# Patient Record
Sex: MALE | Race: WHITE | NOT HISPANIC OR LATINO | Employment: FULL TIME | URBAN - METROPOLITAN AREA
[De-identification: names, ages, dates, MRNs, and addresses within clinical notes are randomized per-mention and may not be internally consistent; named-entity substitution may affect disease eponyms.]

---

## 2017-01-26 ENCOUNTER — ALLSCRIPTS OFFICE VISIT (OUTPATIENT)
Dept: OTHER | Facility: OTHER | Age: 42
End: 2017-01-26

## 2017-06-09 LAB
A/G RATIO (HISTORICAL): 1.7 (ref 1.2–2.2)
ALBUMIN SERPL BCP-MCNC: 4.3 G/DL (ref 3.5–5.5)
ALP SERPL-CCNC: 56 IU/L (ref 39–117)
ALT SERPL W P-5'-P-CCNC: 33 IU/L (ref 0–44)
AST SERPL W P-5'-P-CCNC: 20 IU/L (ref 0–40)
BASOPHILS # BLD AUTO: 0 %
BASOPHILS # BLD AUTO: 0 X10E3/UL (ref 0–0.2)
BILIRUB SERPL-MCNC: <0.2 MG/DL (ref 0–1.2)
BUN SERPL-MCNC: 16 MG/DL (ref 6–24)
BUN/CREA RATIO (HISTORICAL): 18 (ref 9–20)
CALCIUM SERPL-MCNC: 9.3 MG/DL (ref 8.7–10.2)
CHLORIDE SERPL-SCNC: 99 MMOL/L (ref 96–106)
CHOLEST SERPL-MCNC: 157 MG/DL (ref 100–199)
CHOLEST/HDLC SERPL: 4.1 RATIO UNITS (ref 0–5)
CO2 SERPL-SCNC: 27 MMOL/L (ref 18–29)
CREAT SERPL-MCNC: 0.89 MG/DL (ref 0.76–1.27)
CREATININE, URINE (HISTORICAL): 144.5 MG/DL
DEPRECATED RDW RBC AUTO: 14 % (ref 12.3–15.4)
EGFR AFRICAN AMERICAN (HISTORICAL): 122 ML/MIN/1.73
EGFR-AMERICAN CALC (HISTORICAL): 105 ML/MIN/1.73
EOSINOPHIL # BLD AUTO: 0.2 X10E3/UL (ref 0–0.4)
EOSINOPHIL # BLD AUTO: 2 %
GLUCOSE SERPL-MCNC: 162 MG/DL (ref 65–99)
HCT VFR BLD AUTO: 40 % (ref 37.5–51)
HDLC SERPL-MCNC: 38 MG/DL
HGB BLD-MCNC: 13.7 G/DL (ref 12.6–17.7)
IMM.GRANULOCYTES (CD4/8) (HISTORICAL): 0 %
IMM.GRANULOCYTES (CD4/8) (HISTORICAL): 0 X10E3/UL (ref 0–0.1)
LDLC SERPL CALC-MCNC: 74 MG/DL (ref 0–99)
LYMPHOCYTES # BLD AUTO: 2.2 X10E3/UL (ref 0.7–3.1)
LYMPHOCYTES # BLD AUTO: 34 %
MCH RBC QN AUTO: 28 PG (ref 26.6–33)
MCHC RBC AUTO-ENTMCNC: 34.3 G/DL (ref 31.5–35.7)
MCV RBC AUTO: 82 FL (ref 79–97)
MICROALBUM.,U,RANDOM (HISTORICAL): 10.9 UG/ML
MICROALBUMIN/CREATININE RATIO (HISTORICAL): 7.5 MG/G CREAT (ref 0–30)
MONOCYTES # BLD AUTO: 0.5 X10E3/UL (ref 0.1–0.9)
MONOCYTES (HISTORICAL): 7 %
NEUTROPHILS # BLD AUTO: 3.6 X10E3/UL (ref 1.4–7)
NEUTROPHILS # BLD AUTO: 57 %
PLATELET # BLD AUTO: 216 X10E3/UL (ref 150–379)
POTASSIUM SERPL-SCNC: 4.5 MMOL/L (ref 3.5–5.2)
RBC (HISTORICAL): 4.9 X10E6/UL (ref 4.14–5.8)
SODIUM SERPL-SCNC: 142 MMOL/L (ref 134–144)
TOT. GLOBULIN, SERUM (HISTORICAL): 2.5 G/DL (ref 1.5–4.5)
TOTAL PROTEIN (HISTORICAL): 6.8 G/DL (ref 6–8.5)
TRIGL SERPL-MCNC: 225 MG/DL (ref 0–149)
VLDLC SERPL CALC-MCNC: 45 MG/DL (ref 5–40)
WBC # BLD AUTO: 6.4 X10E3/UL (ref 3.4–10.8)

## 2017-06-10 LAB — HBA1C MFR BLD HPLC: 8.4 % (ref 4.8–5.6)

## 2017-06-19 ENCOUNTER — ALLSCRIPTS OFFICE VISIT (OUTPATIENT)
Dept: OTHER | Facility: OTHER | Age: 42
End: 2017-06-19

## 2018-01-09 NOTE — MISCELLANEOUS
Message  Return to work or school:    He is able to return to work on  8/19/16      EXCUSE FROM WORK 8/14-8/18/16     Kia Barrett MD       Signatures   Electronically signed by : DOMITILA Jimenez ; Aug 17 2016  5:56AM EST                       (Author)

## 2018-01-13 VITALS
DIASTOLIC BLOOD PRESSURE: 80 MMHG | WEIGHT: 252 LBS | BODY MASS INDEX: 38.19 KG/M2 | TEMPERATURE: 97.6 F | SYSTOLIC BLOOD PRESSURE: 142 MMHG | HEIGHT: 68 IN | HEART RATE: 88 BPM

## 2018-01-14 VITALS
BODY MASS INDEX: 37.91 KG/M2 | WEIGHT: 250.13 LBS | HEART RATE: 74 BPM | RESPIRATION RATE: 16 BRPM | HEIGHT: 68 IN | DIASTOLIC BLOOD PRESSURE: 82 MMHG | TEMPERATURE: 97.5 F | SYSTOLIC BLOOD PRESSURE: 124 MMHG

## 2018-01-16 NOTE — RESULT NOTES
Verified Results  CT CHEST W CONTRAST [id number] 11:35AM Aurelia Bettencourt Order Number: [id number]    - Patient Instructions: To schedule this appointment, please contact Central Scheduling at [phone]  Test Name Result Flag Reference   CT CHEST W CONTRAST (Report)     CT CHEST WITH IV CONTRAST     INDICATION: Adenopathy  COMPARISON: None  TECHNIQUE: CT examination of the chest was performed  85 mL of Omnipaque 350 was injected intravenously  Axial, sagittal and coronal reformatted images were submitted for interpretation  Coronal thick section MIP (maximal intensity projection) images    were also created  This examination, like all CT scans performed in the [facility], was performed utilizing techniques to minimize radiation dose exposure, including the use of iterative reconstruction and automated exposure control  FINDINGS:     LUNGS: No airspace consolidation is identified  No secondary lobular thickening  No significant nodules are identified  No endobronchial lesion is identified  PLEURA: Trace pleural thickening involving the left horizontal fissure  Otherwise, pleura is unremarkable  HEART/GREAT VESSELS: Heart is normal in size  The aortic arch is normal in caliber  Arch vessels are normal in configuration  Descending thoracic aorta is normal in caliber  MEDIASTINUM AND BRY: The mediastinum is within normal limits without evidence for adenopathy or mass  No hilar lymph nodes identified  CHEST WALL AND LOWER NECK: Lymph nodes identified within the right axillary region of which, there is a large giovana conglomeration measuring 3 8 x 4 4 cm  No significant left-sided axillary adenopathy or additional lymph nodes identified  No signs of    retroperitoneal adenopathy identified  Minimal soft tissue seen in the periareolar region may reflect an element of underlying gynecomastia       VISUALIZED STRUCTURES IN THE UPPER ABDOMEN: There is a small hiatal hernia  Otherwise, visualized upper abdominal structures are unremarkable  OSSEOUS STRUCTURES: No acute fracture  No destructive osseous lesion  IMPRESSION:     Relatively isolated right axillary pathologic adenopathy  Consider tissue sampling as clinically indicated  Clear lungs without structural disease or nodule         Workstation performed: [id number]     Signed by:   Fady Cuadra MD   8/3/16

## 2018-02-18 DIAGNOSIS — E78.2 MIXED HYPERLIPIDEMIA: Primary | ICD-10-CM

## 2018-02-19 RX ORDER — ATORVASTATIN CALCIUM 10 MG/1
TABLET, FILM COATED ORAL
Qty: 90 TABLET | Refills: 1 | Status: SHIPPED | OUTPATIENT
Start: 2018-02-19 | End: 2018-08-18 | Stop reason: SDUPTHER

## 2018-03-02 ENCOUNTER — APPOINTMENT (OUTPATIENT)
Dept: LAB | Facility: CLINIC | Age: 43
End: 2018-03-02
Payer: COMMERCIAL

## 2018-03-02 ENCOUNTER — TRANSCRIBE ORDERS (OUTPATIENT)
Dept: LAB | Facility: CLINIC | Age: 43
End: 2018-03-02

## 2018-03-02 DIAGNOSIS — E10.49 TYPE 1 DIABETES MELLITUS WITH OTHER NEUROLOGIC COMPLICATION (HCC): ICD-10-CM

## 2018-03-02 DIAGNOSIS — M10.9 PODAGRA: ICD-10-CM

## 2018-03-02 DIAGNOSIS — E78.5 HYPERLIPIDEMIA, UNSPECIFIED HYPERLIPIDEMIA TYPE: ICD-10-CM

## 2018-03-02 DIAGNOSIS — E10.49 TYPE 1 DIABETES MELLITUS WITH OTHER NEUROLOGIC COMPLICATION (HCC): Primary | ICD-10-CM

## 2018-03-02 LAB
ANION GAP SERPL CALCULATED.3IONS-SCNC: 4 MMOL/L (ref 4–13)
BUN SERPL-MCNC: 18 MG/DL (ref 5–25)
CALCIUM SERPL-MCNC: 9.3 MG/DL (ref 8.3–10.1)
CHLORIDE SERPL-SCNC: 103 MMOL/L (ref 100–108)
CO2 SERPL-SCNC: 33 MMOL/L (ref 21–32)
CREAT SERPL-MCNC: 1.03 MG/DL (ref 0.6–1.3)
EST. AVERAGE GLUCOSE BLD GHB EST-MCNC: 197 MG/DL
GFR SERPL CREATININE-BSD FRML MDRD: 89 ML/MIN/1.73SQ M
GLUCOSE P FAST SERPL-MCNC: 182 MG/DL (ref 65–99)
HBA1C MFR BLD: 8.5 % (ref 4.2–6.3)
POTASSIUM SERPL-SCNC: 4.3 MMOL/L (ref 3.5–5.3)
SODIUM SERPL-SCNC: 140 MMOL/L (ref 136–145)
URATE SERPL-MCNC: 6.7 MG/DL (ref 4.2–8)

## 2018-03-02 PROCEDURE — 36415 COLL VENOUS BLD VENIPUNCTURE: CPT

## 2018-03-02 PROCEDURE — 80048 BASIC METABOLIC PNL TOTAL CA: CPT

## 2018-03-02 PROCEDURE — 84550 ASSAY OF BLOOD/URIC ACID: CPT

## 2018-03-02 PROCEDURE — 83036 HEMOGLOBIN GLYCOSYLATED A1C: CPT

## 2018-03-29 ENCOUNTER — OFFICE VISIT (OUTPATIENT)
Dept: FAMILY MEDICINE CLINIC | Facility: CLINIC | Age: 43
End: 2018-03-29
Payer: COMMERCIAL

## 2018-03-29 VITALS
BODY MASS INDEX: 38.45 KG/M2 | SYSTOLIC BLOOD PRESSURE: 122 MMHG | WEIGHT: 245 LBS | TEMPERATURE: 97.6 F | DIASTOLIC BLOOD PRESSURE: 82 MMHG | HEART RATE: 90 BPM | HEIGHT: 67 IN

## 2018-03-29 DIAGNOSIS — M10.9 GOUT, UNSPECIFIED CAUSE, UNSPECIFIED CHRONICITY, UNSPECIFIED SITE: ICD-10-CM

## 2018-03-29 DIAGNOSIS — E11.9 TYPE 2 DIABETES MELLITUS WITHOUT COMPLICATION, WITHOUT LONG-TERM CURRENT USE OF INSULIN (HCC): Primary | ICD-10-CM

## 2018-03-29 DIAGNOSIS — E78.2 MIXED HYPERLIPIDEMIA: ICD-10-CM

## 2018-03-29 PROCEDURE — 99214 OFFICE O/P EST MOD 30 MIN: CPT | Performed by: FAMILY MEDICINE

## 2018-03-29 RX ORDER — INDOMETHACIN 50 MG/1
CAPSULE ORAL EVERY 8 HOURS
COMMUNITY
Start: 2014-11-14 | End: 2018-06-02 | Stop reason: SDUPTHER

## 2018-03-29 NOTE — PROGRESS NOTES
Assessment/Plan:    Type 2 diabetes mellitus (HCC)  Poor control  Patient appeared to been doing much better when on Jardiance  Restart 10 mg a day-samples given  Recheck labs in 3 months  Further recommendations based on those results  Urged diet control as well as increased exercise in the interim  Hyperlipidemia  Controlled  Continue present medications  Recheck labs in 3 months    GERD without esophagitis  Stable  Continue monitor  Continue diet  Gout  Control  Continue dietary  Continue medicine p r n  [place] [facility] Diagnoses and all orders for this visit:    Type 2 diabetes mellitus without complication, without long-term current use of insulin (HCC)  -     Comprehensive metabolic panel; Future  -     HEMOGLOBIN A1C W/ EAG ESTIMATION; Future  -     Lipid panel; Future  -     Microalbumin / creatinine urine ratio; Future  -     TSH, 3rd generation; Future    Mixed hyperlipidemia    Gout, unspecified cause, unspecified chronicity, unspecified site  -     CBC and differential; Future          Subjective:      Patient ID: Josie San is a 43 y o  male  - pt took the samples of Jardiance and did note that his Bgs were down to 110-130 fasting  Ran out over 2-3 m ago and fasting Bgs now up to 150-180  Recent A1C = 8 7  No hypoglycemia  - pt states that he has had one"gout attack" in 6m  Uric acid 6 7 on diet  Attack resolved quickly with indocin    - no Cp, palp, lightheadedness or other CV symptoms with or without exertion  Is exercising regularly  - increased urination when taking Jardiance  Imporved off med  - no other concerns        The following portions of the patient's history were reviewed and updated as appropriate:   He  has a past medical history of Diabetes mellitus ([address] ) and GERD (gastroesophageal reflux disease)    He   Patient Active Problem List    Diagnosis Date Noted    Arthralgia of right knee 11/30/2015    GERD without esophagitis 11/14/2014    Hyperlipidemia 01/15/2014    Type 2 diabetes mellitus ([facility] [address] ) 01/14/2014    Gout 12/04/2013     He  reports that he has never smoked  He does not have any smokeless tobacco history on file  He reports that he does not drink alcohol or use drugs  Current Outpatient Prescriptions   Medication Sig Dispense Refill    atorvastatin (LIPITOR) 10 mg tablet TAKE 1 TABLET DAILY 90 tablet 1    JULISA MICROLET LANCETS lancets by Does not apply route 3 (three) times a day      Empagliflozin (JARDIANCE) 10 MG TABS Take 1 tablet by mouth daily      glucose blood (JULISA CONTOUR NEXT TEST) test strip by In Vitro route 3 (three) times a day      indomethacin (INDOCIN) 50 mg capsule Take by mouth every 8 (eight) hours      metFORMIN (GLUCOPHAGE) 500 mg tablet Take 500 mg by mouth 2 (two) times a day with meals   omeprazole (PriLOSEC) 40 MG capsule Take 40 mg by mouth daily  No current facility-administered medications for this visit  He has No Known Allergies       Review of Systems   Constitutional: Negative  HENT: Negative  Eyes: Negative  Respiratory: Negative  Cardiovascular: Negative  Gastrointestinal: Negative  Endocrine: Negative  Genitourinary: Negative  Musculoskeletal: Negative  Skin: Negative  Allergic/Immunologic: Negative  Neurological: Negative  Hematological: Negative  Psychiatric/Behavioral: Negative  Objective:      /82   Pulse 90   Temp 97 6 °F (36 4 °C)   Ht 5' 7" (1 702 m)   Wt 111 kg (245 lb)   BMI 38 37 kg/m²          Physical Exam   Constitutional: He is oriented to person, place, and time  He appears well-developed and well-nourished  HENT:   Head: Normocephalic and atraumatic  Right Ear: External ear normal    Left Ear: External ear normal    Nose: Nose normal    Mouth/Throat: Oropharynx is clear and moist    Eyes: Conjunctivae and EOM are normal  Pupils are equal, round, and reactive to light  Neck: Normal range of motion  Neck supple   No thyromegaly present  Cardiovascular: Normal rate, regular rhythm, normal heart sounds and intact distal pulses  Pulses are no weak pulses  No murmur heard  Pulses:       Dorsalis pedis pulses are 2+ on the right side, and 2+ on the left side  Pulmonary/Chest: Effort normal and breath sounds normal    Abdominal: Soft  Bowel sounds are normal  He exhibits no distension and no mass  There is no tenderness  Musculoskeletal: Normal range of motion  He exhibits no edema or tenderness  Feet:   Right Foot:   Skin Integrity: Negative for ulcer, skin breakdown, erythema, warmth, callus or dry skin  Left Foot:   Skin Integrity: Negative for ulcer, skin breakdown, erythema, warmth, callus or dry skin  Lymphadenopathy:     He has no cervical adenopathy  Neurological: He is alert and oriented to person, place, and time  No cranial nerve deficit  Coordination normal    Skin: Skin is warm  Psychiatric: He has a normal mood and affect  His behavior is normal  Judgment and thought content normal    Vitals reviewed  Patient's shoes and socks removed  Right Foot/Ankle   Right Foot Inspection  Skin Exam: skin normal and skin intact no dry skin, no warmth, no callus, no erythema, no maceration, no abnormal color, no pre-ulcer, no ulcer and no callus                          Toe Exam: ROM and strength within normal limits  Sensory     Proprioception: intact   Monofilament testing: intact  Vascular  Capillary refills: < 3 seconds  The right DP pulse is 2+  Left Foot/Ankle  Left Foot Inspection  Skin Exam: skin normal and skin intactno dry skin, no warmth, no erythema, no maceration, normal color, no pre-ulcer, no ulcer and no callus                         Toe Exam: ROM and strength within normal limits                   Sensory     Proprioception: intact  Monofilament: intact  Vascular  Capillary refills: < 3 seconds  The left DP pulse is 2+  Assign Risk Category:  No deformity present;  No loss of protective sensation;  No weak pulses       Risk: 0

## 2018-03-30 NOTE — ASSESSMENT & PLAN NOTE
Poor control  Patient appeared to been doing much better when on Jardiance  Restart 10 mg a day-samples given  Recheck labs in 3 months  Further recommendations based on those results  Urged diet control as well as increased exercise in the interim

## 2018-05-31 ENCOUNTER — OFFICE VISIT (OUTPATIENT)
Dept: URGENT CARE | Facility: MEDICAL CENTER | Age: 43
End: 2018-05-31
Payer: COMMERCIAL

## 2018-05-31 VITALS
OXYGEN SATURATION: 98 % | HEART RATE: 94 BPM | RESPIRATION RATE: 16 BRPM | HEIGHT: 67 IN | WEIGHT: 233.8 LBS | TEMPERATURE: 98.2 F | DIASTOLIC BLOOD PRESSURE: 74 MMHG | BODY MASS INDEX: 36.7 KG/M2 | SYSTOLIC BLOOD PRESSURE: 108 MMHG

## 2018-05-31 DIAGNOSIS — L02.92 BOIL: Primary | ICD-10-CM

## 2018-05-31 PROCEDURE — 99214 OFFICE O/P EST MOD 30 MIN: CPT | Performed by: PHYSICIAN ASSISTANT

## 2018-05-31 RX ORDER — SULFAMETHOXAZOLE AND TRIMETHOPRIM 800; 160 MG/1; MG/1
1 TABLET ORAL EVERY 12 HOURS SCHEDULED
Qty: 14 TABLET | Refills: 0 | Status: SHIPPED | OUTPATIENT
Start: 2018-05-31 | End: 2018-06-07

## 2018-06-01 NOTE — PROGRESS NOTES
[facility]        NAME: Reginald Parker is a 37 y o  male  : 1975    MRN: [id number]  DATE: May 31, 2018  TIME: 9:22 PM    Assessment and Plan   Boil [L02 92]  1  Boil  sulfamethoxazole-trimethoprim (BACTRIM DS) 800-160 mg per tablet         Patient Instructions       Follow up with PCP in 3-5 days  Proceed to  ER if symptoms worsen  Chief Complaint     Chief Complaint   Patient presents with    Skin Lesion     possible ingrown hair to lower midline abdomen  has had for 2 weeks, but has increased in size and pain  History of Present Illness       The patient is a 12-year-old male presents with a painful lesion on his lower abdomen x1 week  He states the area became inflamed approximately 1 week prior and thought it was a [employer]  Patient is eyes any drainage from the lesion, he does report that the area has become increasingly irritated, swollen and tender  He denies any systemic symptoms such as fever or chills, he has had no skin drainage  Review of Systems   Review of Systems   Constitutional: Negative  HENT: Negative  Skin: Positive for wound  Current Medications       Current Outpatient Prescriptions:     atorvastatin (LIPITOR) 10 mg tablet, TAKE 1 TABLET DAILY, Disp: 90 tablet, Rfl: 1    JULISA MICROLET LANCETS lancets, by Does not apply route 3 (three) times a day, Disp: , Rfl:     Empagliflozin (JARDIANCE) 10 MG TABS, Take 1 tablet by mouth daily, Disp: , Rfl:     glucose blood (JULISA CONTOUR NEXT TEST) test strip, by In Vitro route 3 (three) times a day, Disp: , Rfl:     indomethacin (INDOCIN) 50 mg capsule, Take by mouth every 8 (eight) hours, Disp: , Rfl:     metFORMIN (GLUCOPHAGE) 500 mg tablet, Take 500 mg by mouth 2 (two) times a day with meals  , Disp: , Rfl:     omeprazole (PriLOSEC) 40 MG capsule, Take 40 mg by mouth daily  , Disp: , Rfl:     sulfamethoxazole-trimethoprim (BACTRIM DS) 800-160 mg per tablet, Take 1 tablet by mouth every 12 (twelve) hours for 7 days, Disp: 14 tablet, Rfl: 0    Current Allergies     Allergies as of 05/31/2018    (No Known Allergies)            The following portions of the patient's history were reviewed and updated as appropriate: allergies, current medications, past family history, past medical history, past social history, past surgical history and problem list      Past Medical History:   Diagnosis Date    Diabetes mellitus ([address] )     elevated bloodsugars     GERD (gastroesophageal reflux disease)        Past Surgical History:   Procedure Laterality Date    CHEST WALL BIOPSY N/A 9/15/2016    Procedure: CHEST LESION EXCISION ;  Surgeon: Mike Miller MD;  [facility];  Service:     WI BIOPSY/EXCISION, LYMPH NODE(S) Right 9/15/2016    Procedure: AXILLARY LYMPH NODE BIOPSY WITH FLOW CYTOMETRY;  Surgeon: Mike Miller MD;  [facility];  Service: General    TRUNK SKIN LESION EXCISIONAL BIOPSY      Benign 2 1-3 cm    UPPER ENDOSCOPY W/ ESOPHAGEAL MANOMETRY         Family History   Problem Relation Age of Onset    Hypertension Mother     Diabetes Father     Prostate cancer Father          Medications have been verified  Objective   /74   Pulse 94   Temp 98 2 °F (36 8 °C) (Temporal)   Resp 16   Ht 5' 7" (1 702 m)   Wt 106 kg (233 lb 12 8 oz)   SpO2 98%   BMI 36 62 kg/m²        Physical Exam     Physical Exam   Constitutional: He appears well-developed and well-nourished  No distress  Cardiovascular: Normal rate, regular rhythm and normal heart sounds  No murmur heard    Skin:

## 2018-06-01 NOTE — PATIENT INSTRUCTIONS
1  Take Bactrim 1 tablet twice daily x 7 days  2  Warm compresses to lesion until resolved  3  Follow-up with PCP if symptoms worsen

## 2018-06-02 DIAGNOSIS — E11.9 TYPE 2 DIABETES MELLITUS WITHOUT COMPLICATION, WITHOUT LONG-TERM CURRENT USE OF INSULIN (HCC): Primary | ICD-10-CM

## 2018-06-02 DIAGNOSIS — M10.9 GOUT, UNSPECIFIED CAUSE, UNSPECIFIED CHRONICITY, UNSPECIFIED SITE: ICD-10-CM

## 2018-06-03 RX ORDER — BLOOD SUGAR DIAGNOSTIC
STRIP MISCELLANEOUS
Qty: 300 EACH | Refills: 1 | Status: SHIPPED | OUTPATIENT
Start: 2018-06-03 | End: 2018-11-30 | Stop reason: SDUPTHER

## 2018-06-03 RX ORDER — INDOMETHACIN 50 MG/1
CAPSULE ORAL
Qty: 270 CAPSULE | Refills: 1 | Status: SHIPPED | OUTPATIENT
Start: 2018-06-03 | End: 2018-11-29 | Stop reason: SDUPTHER

## 2018-06-05 ENCOUNTER — OFFICE VISIT (OUTPATIENT)
Dept: FAMILY MEDICINE CLINIC | Facility: CLINIC | Age: 43
End: 2018-06-05
Payer: COMMERCIAL

## 2018-06-05 VITALS
HEART RATE: 84 BPM | BODY MASS INDEX: 37.07 KG/M2 | TEMPERATURE: 99.3 F | WEIGHT: 236.2 LBS | DIASTOLIC BLOOD PRESSURE: 88 MMHG | HEIGHT: 67 IN | SYSTOLIC BLOOD PRESSURE: 120 MMHG

## 2018-06-05 DIAGNOSIS — L08.9 INFECTED CYST OF SKIN: Primary | ICD-10-CM

## 2018-06-05 DIAGNOSIS — L72.9 INFECTED CYST OF SKIN: Primary | ICD-10-CM

## 2018-06-05 PROCEDURE — 87205 SMEAR GRAM STAIN: CPT | Performed by: FAMILY MEDICINE

## 2018-06-05 PROCEDURE — 99213 OFFICE O/P EST LOW 20 MIN: CPT | Performed by: FAMILY MEDICINE

## 2018-06-05 PROCEDURE — 87070 CULTURE OTHR SPECIMN AEROBIC: CPT | Performed by: FAMILY MEDICINE

## 2018-06-05 PROCEDURE — 10060 I&D ABSCESS SIMPLE/SINGLE: CPT | Performed by: FAMILY MEDICINE

## 2018-06-05 RX ORDER — AMOXICILLIN AND CLAVULANATE POTASSIUM 875; 125 MG/1; MG/1
1 TABLET, FILM COATED ORAL EVERY 12 HOURS SCHEDULED
Qty: 20 TABLET | Refills: 0 | Status: SHIPPED | OUTPATIENT
Start: 2018-06-05 | End: 2018-06-15

## 2018-06-05 NOTE — PROGRESS NOTES
Assessment/Plan:    No problem-specific Assessment & Plan notes found for this encounter  Diagnoses and all orders for this visit:    Infected cyst of skin  Comments:  improved s/p I&D  Discussed wound care  Start Augmentin  Finish Bactrim  Remove packing in 24-48h  Recheck Friday if not resolving - earlier if worse  Orders:  -     Incision and Drainage  -     amoxicillin-clavulanate (AUGMENTIN) 875-125 mg per tablet; Take 1 tablet by mouth every 12 (twelve) hours for 10 days  -     Wound culture and Gram stain; Future  -     Wound culture and Gram stain          Subjective:      Patient ID: Carlee Saucedo is a 37 y o  male  Pt with long hx of ?small mass/cyst over the low abd along his belt line  Lesion increased in size and became tender over the last week  Seen at walk-in center this past weekend and started on Bactrim without improvement  Pt with increased pain over the last 24h  Pt here for recheck  Pt denies fever/chills, diffuse abd pain or other problems  Pt denies drainage from the site        The following portions of the patient's history were reviewed and updated as appropriate:   He  has a past medical history of Diabetes mellitus ([facility] [address] ) and GERD (gastroesophageal reflux disease)  He   Patient Active Problem List    Diagnosis Date Noted    Arthralgia of right knee 11/30/2015    GERD without esophagitis 11/14/2014    Hyperlipidemia 01/15/2014    Type 2 diabetes mellitus ([facility] [address] ) 01/14/2014    Gout 12/04/2013     He  reports that he has never smoked  He has never used smokeless tobacco  He reports that he does not drink alcohol or use drugs    Current Outpatient Prescriptions   Medication Sig Dispense Refill    atorvastatin (LIPITOR) 10 mg tablet TAKE 1 TABLET DAILY 90 tablet 1    JULISA MICROLET LANCETS lancets by Does not apply route 3 (three) times a day      CONTOUR NEXT TEST test strip TEST THREE TIMES A  each 1    Empagliflozin (JARDIANCE) 10 MG TABS Take 1 tablet by mouth daily      indomethacin (INDOCIN) 50 mg capsule TAKE 1 CAPSULE EVERY 8 HOURS AS NEEDED 270 capsule 1    metFORMIN (GLUCOPHAGE) 500 mg tablet Take 500 mg by mouth 2 (two) times a day with meals   omeprazole (PriLOSEC) 40 MG capsule Take 40 mg by mouth daily   sulfamethoxazole-trimethoprim (BACTRIM DS) 800-160 mg per tablet Take 1 tablet by mouth every 12 (twelve) hours for 7 days 14 tablet 0    amoxicillin-clavulanate (AUGMENTIN) 875-125 mg per tablet Take 1 tablet by mouth every 12 (twelve) hours for 10 days 20 tablet 0     No current facility-administered medications for this visit  He has No Known Allergies       Review of Systems   Constitutional: Negative  Gastrointestinal: Positive for abdominal pain (at low abd site)  Negative for abdominal distention, anal bleeding, blood in stool, constipation, diarrhea, nausea and vomiting  Skin: Positive for color change and rash  Negative for pallor and wound  Objective:      /88   Pulse 84   Temp 99 3 °F (37 4 °C)   Ht 5' 7" (1 702 m)   Wt 107 kg (236 lb 3 2 oz)   BMI 36 99 kg/m²          Physical Exam   Constitutional: He appears well-developed and well-nourished  Abdominal: Soft  Bowel sounds are normal  He exhibits no distension  There is tenderness (tender mass with centrlal fluctuance over the low abd along the belt line  No drainage)  Skin: Skin is warm  There is erythema (as above)           Incision and Drainage  Date/Time: 6/5/2018 2:11 PM  Performed by: Johann Shaikh by: Natalie Rios     Patient location:  Clinic  Other Assisting Provider: No    Consent:     Consent obtained:  Verbal    Consent given by:  Patient    Risks discussed:  Bleeding, incomplete drainage, pain and infection    Alternatives discussed:  Observation  Universal protocol:     Procedure explained and questions answered to patient or proxy's satisfaction: yes      Patient identity confirmed:  Verbally with patient  Location:     Type:  Abscess and cyst    Location:  Trunk    Trunk location:  Abdomen  Pre-procedure details:     Skin preparation:  Betadine  Anesthesia (see MAR for exact dosages): Anesthesia method:  Local infiltration    Local anesthetic:  Lidocaine 2% w/o epi  Procedure details:     Complexity:  Simple    Needle aspiration: no      Incision types:  Stab incision    Scalpel blade:  10    Approach:  Puncture    Incision depth:  Skin and subcutaneous    Wound management:  Probed and deloculated    Drainage:  Purulent    Drainage amount: Moderate    Wound treatment:  Packing placed    Packing materials:  1/4 in iodoform gauze    Amount 1/4" iodoform:  3"  Post-procedure details:     Patient tolerance of procedure:   Tolerated well, no immediate complications    Complication (if applicable):  None

## 2018-06-08 LAB
BACTERIA WND AEROBE CULT: ABNORMAL
GRAM STN SPEC: ABNORMAL
GRAM STN SPEC: ABNORMAL

## 2018-06-13 ENCOUNTER — OFFICE VISIT (OUTPATIENT)
Dept: FAMILY MEDICINE CLINIC | Facility: CLINIC | Age: 43
End: 2018-06-13

## 2018-06-13 VITALS
SYSTOLIC BLOOD PRESSURE: 98 MMHG | TEMPERATURE: 97.9 F | DIASTOLIC BLOOD PRESSURE: 70 MMHG | HEIGHT: 67 IN | BODY MASS INDEX: 36.41 KG/M2 | HEART RATE: 80 BPM | WEIGHT: 232 LBS

## 2018-06-13 DIAGNOSIS — L72.9 INFECTED CYST OF SKIN: Primary | ICD-10-CM

## 2018-06-13 DIAGNOSIS — L08.9 INFECTED CYST OF SKIN: Primary | ICD-10-CM

## 2018-06-13 DIAGNOSIS — E11.9 TYPE 2 DIABETES MELLITUS WITHOUT COMPLICATION, WITHOUT LONG-TERM CURRENT USE OF INSULIN (HCC): ICD-10-CM

## 2018-06-13 PROCEDURE — RECHECK: Performed by: FAMILY MEDICINE

## 2018-06-13 NOTE — PROGRESS NOTES
Assessment/Plan:      Diagnoses and all orders for this visit:    Type 2 diabetes mellitus without complication, without long-term current use of insulin ([facility])  -     Empagliflozin (JARDIANCE) 10 MG TABS; Take 1 tablet (10 mg total) by mouth daily    Infected cyst of skin     Discussion: wound still with some purulent discharge but no cyst material  Fullness below the incision raises the possibility of loculations  REC; finsih abx  Moist heat and gentle massage to area as discussed  Recheck 1 week if no t resolving - earlier if worse    Subjective:     Patient ID: Yue Almazan is a 37 y o  male  Pt is 8 day s/p I&D of low abd wall abscess (?abscessed cyst)  Pt states that the area is much better and the pain has resolved but there is still some fullness in the area and some drainage from the site  No redness, fever or chills  Cx grew out Coag Neg Staph  Pt with 2-3 days of augmentin left        Review of Systems   Constitutional: Negative  Skin: Positive for wound  Negative for color change and rash  Objective:     Physical Exam   Constitutional: He appears well-developed and well-nourished  Abdominal: Soft

## 2018-07-16 DIAGNOSIS — E11.9 TYPE 2 DIABETES MELLITUS WITHOUT COMPLICATION, WITHOUT LONG-TERM CURRENT USE OF INSULIN (HCC): Primary | ICD-10-CM

## 2018-08-18 DIAGNOSIS — E78.2 MIXED HYPERLIPIDEMIA: ICD-10-CM

## 2018-08-18 RX ORDER — ATORVASTATIN CALCIUM 10 MG/1
TABLET, FILM COATED ORAL
Qty: 90 TABLET | Refills: 1 | Status: SHIPPED | OUTPATIENT
Start: 2018-08-18 | End: 2019-02-14 | Stop reason: SDUPTHER

## 2018-11-22 DIAGNOSIS — E11.9 TYPE 2 DIABETES MELLITUS WITHOUT COMPLICATION, WITHOUT LONG-TERM CURRENT USE OF INSULIN (HCC): ICD-10-CM

## 2018-11-23 RX ORDER — EMPAGLIFLOZIN 10 MG/1
TABLET, FILM COATED ORAL
Qty: 90 TABLET | Refills: 1 | Status: SHIPPED | OUTPATIENT
Start: 2018-11-23 | End: 2019-01-14 | Stop reason: ALTCHOICE

## 2018-11-29 DIAGNOSIS — M10.9 GOUT, UNSPECIFIED CAUSE, UNSPECIFIED CHRONICITY, UNSPECIFIED SITE: ICD-10-CM

## 2018-11-29 DIAGNOSIS — K21.9 GASTROESOPHAGEAL REFLUX DISEASE, ESOPHAGITIS PRESENCE NOT SPECIFIED: Primary | ICD-10-CM

## 2018-11-29 RX ORDER — OMEPRAZOLE 40 MG/1
CAPSULE, DELAYED RELEASE ORAL
Qty: 90 CAPSULE | Refills: 3 | Status: SHIPPED | OUTPATIENT
Start: 2018-11-29 | End: 2019-11-25 | Stop reason: SDUPTHER

## 2018-11-29 RX ORDER — INDOMETHACIN 50 MG/1
CAPSULE ORAL
Qty: 270 CAPSULE | Refills: 1 | Status: SHIPPED | OUTPATIENT
Start: 2018-11-29 | End: 2019-05-28 | Stop reason: SDUPTHER

## 2018-11-29 NOTE — TELEPHONE ENCOUNTER
Pt due for f/u re: DM next month  Please have him schedule appt   Lab orders should be available in epic

## 2018-11-30 DIAGNOSIS — E11.9 TYPE 2 DIABETES MELLITUS WITHOUT COMPLICATION, WITHOUT LONG-TERM CURRENT USE OF INSULIN (HCC): ICD-10-CM

## 2018-11-30 RX ORDER — BLOOD SUGAR DIAGNOSTIC
STRIP MISCELLANEOUS
Qty: 300 EACH | Refills: 1 | Status: SHIPPED | OUTPATIENT
Start: 2018-11-30 | End: 2019-05-29 | Stop reason: SDUPTHER

## 2019-01-10 ENCOUNTER — APPOINTMENT (OUTPATIENT)
Dept: LAB | Facility: CLINIC | Age: 44
End: 2019-01-10
Payer: COMMERCIAL

## 2019-01-10 DIAGNOSIS — E11.9 TYPE 2 DIABETES MELLITUS WITHOUT COMPLICATION, WITHOUT LONG-TERM CURRENT USE OF INSULIN (HCC): ICD-10-CM

## 2019-01-10 DIAGNOSIS — M10.9 GOUT, UNSPECIFIED CAUSE, UNSPECIFIED CHRONICITY, UNSPECIFIED SITE: ICD-10-CM

## 2019-01-10 LAB
ALBUMIN SERPL BCP-MCNC: 4.2 G/DL (ref 3.5–5)
ALP SERPL-CCNC: 54 U/L (ref 46–116)
ALT SERPL W P-5'-P-CCNC: 37 U/L (ref 12–78)
ANION GAP SERPL CALCULATED.3IONS-SCNC: 5 MMOL/L (ref 4–13)
AST SERPL W P-5'-P-CCNC: 18 U/L (ref 5–45)
BASOPHILS # BLD AUTO: 0.03 THOUSANDS/ΜL (ref 0–0.1)
BASOPHILS NFR BLD AUTO: 1 % (ref 0–1)
BILIRUB SERPL-MCNC: 0.5 MG/DL (ref 0.2–1)
BUN SERPL-MCNC: 22 MG/DL (ref 5–25)
CALCIUM SERPL-MCNC: 9.3 MG/DL (ref 8.3–10.1)
CHLORIDE SERPL-SCNC: 104 MMOL/L (ref 100–108)
CHOLEST SERPL-MCNC: 136 MG/DL (ref 50–200)
CO2 SERPL-SCNC: 29 MMOL/L (ref 21–32)
CREAT SERPL-MCNC: 1.02 MG/DL (ref 0.6–1.3)
CREAT UR-MCNC: 94.8 MG/DL
EOSINOPHIL # BLD AUTO: 0.14 THOUSAND/ΜL (ref 0–0.61)
EOSINOPHIL NFR BLD AUTO: 2 % (ref 0–6)
ERYTHROCYTE [DISTWIDTH] IN BLOOD BY AUTOMATED COUNT: 12.5 % (ref 11.6–15.1)
EST. AVERAGE GLUCOSE BLD GHB EST-MCNC: 212 MG/DL
GFR SERPL CREATININE-BSD FRML MDRD: 90 ML/MIN/1.73SQ M
GLUCOSE P FAST SERPL-MCNC: 139 MG/DL (ref 65–99)
HBA1C MFR BLD: 9 % (ref 4.2–6.3)
HCT VFR BLD AUTO: 49.7 % (ref 36.5–49.3)
HDLC SERPL-MCNC: 31 MG/DL (ref 40–60)
HGB BLD-MCNC: 16 G/DL (ref 12–17)
IMM GRANULOCYTES # BLD AUTO: 0.03 THOUSAND/UL (ref 0–0.2)
IMM GRANULOCYTES NFR BLD AUTO: 1 % (ref 0–2)
LDLC SERPL CALC-MCNC: 77 MG/DL (ref 0–100)
LYMPHOCYTES # BLD AUTO: 2.41 THOUSANDS/ΜL (ref 0.6–4.47)
LYMPHOCYTES NFR BLD AUTO: 37 % (ref 14–44)
MCH RBC QN AUTO: 28.5 PG (ref 26.8–34.3)
MCHC RBC AUTO-ENTMCNC: 32.2 G/DL (ref 31.4–37.4)
MCV RBC AUTO: 89 FL (ref 82–98)
MICROALBUMIN UR-MCNC: 8 MG/L (ref 0–20)
MICROALBUMIN/CREAT 24H UR: 8 MG/G CREATININE (ref 0–30)
MONOCYTES # BLD AUTO: 0.5 THOUSAND/ΜL (ref 0.17–1.22)
MONOCYTES NFR BLD AUTO: 8 % (ref 4–12)
NEUTROPHILS # BLD AUTO: 3.4 THOUSANDS/ΜL (ref 1.85–7.62)
NEUTS SEG NFR BLD AUTO: 51 % (ref 43–75)
NONHDLC SERPL-MCNC: 105 MG/DL
NRBC BLD AUTO-RTO: 0 /100 WBCS
PLATELET # BLD AUTO: 213 THOUSANDS/UL (ref 149–390)
PMV BLD AUTO: 10.6 FL (ref 8.9–12.7)
POTASSIUM SERPL-SCNC: 4.3 MMOL/L (ref 3.5–5.3)
PROT SERPL-MCNC: 7.8 G/DL (ref 6.4–8.2)
RBC # BLD AUTO: 5.61 MILLION/UL (ref 3.88–5.62)
SODIUM SERPL-SCNC: 138 MMOL/L (ref 136–145)
TRIGL SERPL-MCNC: 138 MG/DL
TSH SERPL DL<=0.05 MIU/L-ACNC: 2.51 UIU/ML (ref 0.36–3.74)
WBC # BLD AUTO: 6.51 THOUSAND/UL (ref 4.31–10.16)

## 2019-01-10 PROCEDURE — 84443 ASSAY THYROID STIM HORMONE: CPT

## 2019-01-10 PROCEDURE — 82043 UR ALBUMIN QUANTITATIVE: CPT

## 2019-01-10 PROCEDURE — 82570 ASSAY OF URINE CREATININE: CPT

## 2019-01-10 PROCEDURE — 85025 COMPLETE CBC W/AUTO DIFF WBC: CPT

## 2019-01-10 PROCEDURE — 80061 LIPID PANEL: CPT

## 2019-01-10 PROCEDURE — 80053 COMPREHEN METABOLIC PANEL: CPT

## 2019-01-10 PROCEDURE — 36415 COLL VENOUS BLD VENIPUNCTURE: CPT

## 2019-01-10 PROCEDURE — 3061F NEG MICROALBUMINURIA REV: CPT | Performed by: FAMILY MEDICINE

## 2019-01-10 PROCEDURE — 83036 HEMOGLOBIN GLYCOSYLATED A1C: CPT

## 2019-01-12 DIAGNOSIS — E11.9 TYPE 2 DIABETES MELLITUS WITHOUT COMPLICATION, WITHOUT LONG-TERM CURRENT USE OF INSULIN (HCC): ICD-10-CM

## 2019-01-14 ENCOUNTER — OFFICE VISIT (OUTPATIENT)
Dept: FAMILY MEDICINE CLINIC | Facility: CLINIC | Age: 44
End: 2019-01-14
Payer: COMMERCIAL

## 2019-01-14 VITALS
HEIGHT: 67 IN | DIASTOLIC BLOOD PRESSURE: 80 MMHG | HEART RATE: 88 BPM | SYSTOLIC BLOOD PRESSURE: 104 MMHG | WEIGHT: 234.6 LBS | TEMPERATURE: 95.6 F | BODY MASS INDEX: 36.82 KG/M2

## 2019-01-14 DIAGNOSIS — E78.2 MIXED HYPERLIPIDEMIA: ICD-10-CM

## 2019-01-14 DIAGNOSIS — E11.9 TYPE 2 DIABETES MELLITUS WITHOUT COMPLICATION, WITHOUT LONG-TERM CURRENT USE OF INSULIN (HCC): Primary | ICD-10-CM

## 2019-01-14 DIAGNOSIS — M10.9 GOUT, UNSPECIFIED CAUSE, UNSPECIFIED CHRONICITY, UNSPECIFIED SITE: ICD-10-CM

## 2019-01-14 PROCEDURE — 99214 OFFICE O/P EST MOD 30 MIN: CPT | Performed by: FAMILY MEDICINE

## 2019-01-14 PROCEDURE — 3008F BODY MASS INDEX DOCD: CPT | Performed by: FAMILY MEDICINE

## 2019-01-15 NOTE — ASSESSMENT & PLAN NOTE
Lab Results   Component Value Date    HGBA1C 9 0 (H) 01/10/2019     PT doing better with diet and is exercising regularly  REC: increase Jardiance to 25mg qd  Cont present care   Recheck labs and f/u 3m - earlier if home BGs remain elevated

## 2019-02-14 DIAGNOSIS — E78.2 MIXED HYPERLIPIDEMIA: ICD-10-CM

## 2019-02-14 RX ORDER — ATORVASTATIN CALCIUM 10 MG/1
TABLET, FILM COATED ORAL
Qty: 90 TABLET | Refills: 1 | Status: SHIPPED | OUTPATIENT
Start: 2019-02-14 | End: 2019-08-13 | Stop reason: SDUPTHER

## 2019-02-21 DIAGNOSIS — E11.9 TYPE 2 DIABETES MELLITUS WITHOUT COMPLICATION, WITHOUT LONG-TERM CURRENT USE OF INSULIN (HCC): ICD-10-CM

## 2019-04-23 ENCOUNTER — APPOINTMENT (OUTPATIENT)
Dept: LAB | Facility: CLINIC | Age: 44
End: 2019-04-23
Payer: COMMERCIAL

## 2019-04-23 DIAGNOSIS — E11.9 TYPE 2 DIABETES MELLITUS WITHOUT COMPLICATION, WITHOUT LONG-TERM CURRENT USE OF INSULIN (HCC): ICD-10-CM

## 2019-04-23 LAB
ANION GAP SERPL CALCULATED.3IONS-SCNC: 1 MMOL/L (ref 4–13)
BUN SERPL-MCNC: 22 MG/DL (ref 5–25)
CALCIUM SERPL-MCNC: 9.2 MG/DL (ref 8.3–10.1)
CHLORIDE SERPL-SCNC: 108 MMOL/L (ref 100–108)
CO2 SERPL-SCNC: 32 MMOL/L (ref 21–32)
CREAT SERPL-MCNC: 0.97 MG/DL (ref 0.6–1.3)
EST. AVERAGE GLUCOSE BLD GHB EST-MCNC: 140 MG/DL
GFR SERPL CREATININE-BSD FRML MDRD: 95 ML/MIN/1.73SQ M
GLUCOSE P FAST SERPL-MCNC: 121 MG/DL (ref 65–99)
HBA1C MFR BLD: 6.5 % (ref 4.2–6.3)
POTASSIUM SERPL-SCNC: 4.8 MMOL/L (ref 3.5–5.3)
SODIUM SERPL-SCNC: 141 MMOL/L (ref 136–145)

## 2019-04-23 PROCEDURE — 36415 COLL VENOUS BLD VENIPUNCTURE: CPT

## 2019-04-23 PROCEDURE — 83036 HEMOGLOBIN GLYCOSYLATED A1C: CPT

## 2019-04-23 PROCEDURE — 80048 BASIC METABOLIC PNL TOTAL CA: CPT

## 2019-05-01 ENCOUNTER — OFFICE VISIT (OUTPATIENT)
Dept: FAMILY MEDICINE CLINIC | Facility: CLINIC | Age: 44
End: 2019-05-01
Payer: COMMERCIAL

## 2019-05-01 VITALS
HEART RATE: 74 BPM | DIASTOLIC BLOOD PRESSURE: 72 MMHG | HEIGHT: 67 IN | SYSTOLIC BLOOD PRESSURE: 102 MMHG | WEIGHT: 216 LBS | TEMPERATURE: 98.2 F | BODY MASS INDEX: 33.9 KG/M2

## 2019-05-01 DIAGNOSIS — M10.00 IDIOPATHIC GOUT, UNSPECIFIED CHRONICITY, UNSPECIFIED SITE: ICD-10-CM

## 2019-05-01 DIAGNOSIS — E11.9 TYPE 2 DIABETES MELLITUS WITHOUT COMPLICATION, WITHOUT LONG-TERM CURRENT USE OF INSULIN (HCC): Primary | ICD-10-CM

## 2019-05-01 DIAGNOSIS — E78.2 MIXED HYPERLIPIDEMIA: ICD-10-CM

## 2019-05-01 PROCEDURE — 99214 OFFICE O/P EST MOD 30 MIN: CPT | Performed by: FAMILY MEDICINE

## 2019-05-01 PROCEDURE — 3008F BODY MASS INDEX DOCD: CPT | Performed by: FAMILY MEDICINE

## 2019-05-01 PROCEDURE — 1036F TOBACCO NON-USER: CPT | Performed by: FAMILY MEDICINE

## 2019-05-28 DIAGNOSIS — M10.9 GOUT, UNSPECIFIED CAUSE, UNSPECIFIED CHRONICITY, UNSPECIFIED SITE: ICD-10-CM

## 2019-05-28 RX ORDER — INDOMETHACIN 50 MG/1
CAPSULE ORAL
Qty: 270 CAPSULE | Refills: 1 | Status: SHIPPED | OUTPATIENT
Start: 2019-05-28 | End: 2019-11-25 | Stop reason: SDUPTHER

## 2019-05-29 DIAGNOSIS — E11.9 TYPE 2 DIABETES MELLITUS WITHOUT COMPLICATION, WITHOUT LONG-TERM CURRENT USE OF INSULIN (HCC): ICD-10-CM

## 2019-05-29 RX ORDER — BLOOD SUGAR DIAGNOSTIC
STRIP MISCELLANEOUS
Qty: 300 EACH | Refills: 1 | Status: SHIPPED | OUTPATIENT
Start: 2019-05-29 | End: 2019-11-25 | Stop reason: SDUPTHER

## 2019-07-12 DIAGNOSIS — E11.9 TYPE 2 DIABETES MELLITUS WITHOUT COMPLICATION, WITHOUT LONG-TERM CURRENT USE OF INSULIN (HCC): ICD-10-CM

## 2019-07-17 DIAGNOSIS — E11.9 TYPE 2 DIABETES MELLITUS WITHOUT COMPLICATION, WITHOUT LONG-TERM CURRENT USE OF INSULIN (HCC): ICD-10-CM

## 2019-07-18 RX ORDER — EMPAGLIFLOZIN 25 MG/1
TABLET, FILM COATED ORAL
Qty: 90 TABLET | Refills: 1 | Status: SHIPPED | OUTPATIENT
Start: 2019-07-18 | End: 2020-01-23

## 2019-08-02 ENCOUNTER — APPOINTMENT (OUTPATIENT)
Dept: LAB | Facility: CLINIC | Age: 44
End: 2019-08-02
Payer: COMMERCIAL

## 2019-08-02 DIAGNOSIS — E11.9 TYPE 2 DIABETES MELLITUS WITHOUT COMPLICATION, WITHOUT LONG-TERM CURRENT USE OF INSULIN (HCC): ICD-10-CM

## 2019-08-02 DIAGNOSIS — M10.00 IDIOPATHIC GOUT, UNSPECIFIED CHRONICITY, UNSPECIFIED SITE: ICD-10-CM

## 2019-08-02 LAB
ALBUMIN SERPL BCP-MCNC: 3.9 G/DL (ref 3.5–5)
ALP SERPL-CCNC: 58 U/L (ref 46–116)
ALT SERPL W P-5'-P-CCNC: 36 U/L (ref 12–78)
ANION GAP SERPL CALCULATED.3IONS-SCNC: 6 MMOL/L (ref 4–13)
AST SERPL W P-5'-P-CCNC: 19 U/L (ref 5–45)
BILIRUB SERPL-MCNC: 0.94 MG/DL (ref 0.2–1)
BUN SERPL-MCNC: 20 MG/DL (ref 5–25)
CALCIUM SERPL-MCNC: 9.6 MG/DL (ref 8.3–10.1)
CHLORIDE SERPL-SCNC: 105 MMOL/L (ref 100–108)
CHOLEST SERPL-MCNC: 134 MG/DL (ref 50–200)
CO2 SERPL-SCNC: 31 MMOL/L (ref 21–32)
CREAT SERPL-MCNC: 0.96 MG/DL (ref 0.6–1.3)
CREAT UR-MCNC: 81 MG/DL
EST. AVERAGE GLUCOSE BLD GHB EST-MCNC: 123 MG/DL
GFR SERPL CREATININE-BSD FRML MDRD: 96 ML/MIN/1.73SQ M
GLUCOSE P FAST SERPL-MCNC: 108 MG/DL (ref 65–99)
HBA1C MFR BLD: 5.9 % (ref 4.2–6.3)
HDLC SERPL-MCNC: 53 MG/DL (ref 40–60)
LDLC SERPL CALC-MCNC: 64 MG/DL (ref 0–100)
MICROALBUMIN UR-MCNC: <5 MG/L (ref 0–20)
MICROALBUMIN/CREAT 24H UR: <6 MG/G CREATININE (ref 0–30)
NONHDLC SERPL-MCNC: 81 MG/DL
POTASSIUM SERPL-SCNC: 5.1 MMOL/L (ref 3.5–5.3)
PROT SERPL-MCNC: 7.5 G/DL (ref 6.4–8.2)
SODIUM SERPL-SCNC: 142 MMOL/L (ref 136–145)
TRIGL SERPL-MCNC: 86 MG/DL
URATE SERPL-MCNC: 7 MG/DL (ref 4.2–8)

## 2019-08-02 PROCEDURE — 80061 LIPID PANEL: CPT

## 2019-08-02 PROCEDURE — 82570 ASSAY OF URINE CREATININE: CPT

## 2019-08-02 PROCEDURE — 80053 COMPREHEN METABOLIC PANEL: CPT

## 2019-08-02 PROCEDURE — 84550 ASSAY OF BLOOD/URIC ACID: CPT

## 2019-08-02 PROCEDURE — 82043 UR ALBUMIN QUANTITATIVE: CPT

## 2019-08-02 PROCEDURE — 83036 HEMOGLOBIN GLYCOSYLATED A1C: CPT

## 2019-08-02 PROCEDURE — 36415 COLL VENOUS BLD VENIPUNCTURE: CPT

## 2019-08-13 DIAGNOSIS — E78.2 MIXED HYPERLIPIDEMIA: ICD-10-CM

## 2019-08-13 RX ORDER — ATORVASTATIN CALCIUM 10 MG/1
TABLET, FILM COATED ORAL
Qty: 90 TABLET | Refills: 1 | Status: SHIPPED | OUTPATIENT
Start: 2019-08-13 | End: 2020-02-10

## 2019-10-02 ENCOUNTER — TELEPHONE (OUTPATIENT)
Dept: FAMILY MEDICINE CLINIC | Facility: CLINIC | Age: 44
End: 2019-10-02

## 2019-10-02 NOTE — TELEPHONE ENCOUNTER
Patient called stating he is coming in for an appointment in Nov and he is asking if blood work slip is needed? Please call patient when labs are ordered

## 2019-11-18 ENCOUNTER — OFFICE VISIT (OUTPATIENT)
Dept: FAMILY MEDICINE CLINIC | Facility: CLINIC | Age: 44
End: 2019-11-18
Payer: COMMERCIAL

## 2019-11-18 VITALS
SYSTOLIC BLOOD PRESSURE: 104 MMHG | BODY MASS INDEX: 33.61 KG/M2 | DIASTOLIC BLOOD PRESSURE: 72 MMHG | WEIGHT: 214.13 LBS | HEIGHT: 67 IN | HEART RATE: 70 BPM | TEMPERATURE: 98.4 F

## 2019-11-18 DIAGNOSIS — E78.2 MIXED HYPERLIPIDEMIA: ICD-10-CM

## 2019-11-18 DIAGNOSIS — K21.9 GERD WITHOUT ESOPHAGITIS: Primary | ICD-10-CM

## 2019-11-18 DIAGNOSIS — E11.9 TYPE 2 DIABETES MELLITUS WITHOUT COMPLICATION, WITHOUT LONG-TERM CURRENT USE OF INSULIN (HCC): ICD-10-CM

## 2019-11-18 DIAGNOSIS — Z23 IMMUNIZATION DUE: ICD-10-CM

## 2019-11-18 LAB — SL AMB POCT HEMOGLOBIN AIC: 6.8 (ref ?–6.5)

## 2019-11-18 PROCEDURE — 3044F HG A1C LEVEL LT 7.0%: CPT | Performed by: FAMILY MEDICINE

## 2019-11-18 PROCEDURE — 99214 OFFICE O/P EST MOD 30 MIN: CPT | Performed by: FAMILY MEDICINE

## 2019-11-18 PROCEDURE — 1036F TOBACCO NON-USER: CPT | Performed by: FAMILY MEDICINE

## 2019-11-18 PROCEDURE — 90682 RIV4 VACC RECOMBINANT DNA IM: CPT | Performed by: FAMILY MEDICINE

## 2019-11-18 PROCEDURE — 83036 HEMOGLOBIN GLYCOSYLATED A1C: CPT | Performed by: FAMILY MEDICINE

## 2019-11-18 PROCEDURE — 90471 IMMUNIZATION ADMIN: CPT | Performed by: FAMILY MEDICINE

## 2019-11-18 NOTE — ASSESSMENT & PLAN NOTE
Lab Results   Component Value Date    HGBA1C 5 9 08/02/2019     Remains at goal  Cont present exercise and diet plan  Cont to work on weight loss  BMI Counseling: Body mass index is 33 54 kg/m²  The BMI is above normal  Nutrition recommendations include consuming healthier snacks, moderation in carbohydrate intake and increasing intake of lean protein  Exercise recommendations include moderate aerobic physical activity for 150 minutes/week    Recheck 6m

## 2019-11-25 DIAGNOSIS — K21.9 GASTROESOPHAGEAL REFLUX DISEASE, ESOPHAGITIS PRESENCE NOT SPECIFIED: ICD-10-CM

## 2019-11-25 DIAGNOSIS — E11.9 TYPE 2 DIABETES MELLITUS WITHOUT COMPLICATION, WITHOUT LONG-TERM CURRENT USE OF INSULIN (HCC): ICD-10-CM

## 2019-11-25 DIAGNOSIS — M10.9 GOUT, UNSPECIFIED CAUSE, UNSPECIFIED CHRONICITY, UNSPECIFIED SITE: ICD-10-CM

## 2019-11-25 RX ORDER — BLOOD SUGAR DIAGNOSTIC
STRIP MISCELLANEOUS
Qty: 300 EACH | Refills: 3 | Status: SHIPPED | OUTPATIENT
Start: 2019-11-25 | End: 2019-12-12 | Stop reason: ALTCHOICE

## 2019-11-25 RX ORDER — OMEPRAZOLE 40 MG/1
CAPSULE, DELAYED RELEASE ORAL
Qty: 90 CAPSULE | Refills: 4 | Status: SHIPPED | OUTPATIENT
Start: 2019-11-25 | End: 2020-05-27 | Stop reason: ALTCHOICE

## 2019-11-26 RX ORDER — INDOMETHACIN 50 MG/1
CAPSULE ORAL
Qty: 270 CAPSULE | Refills: 4 | Status: SHIPPED | OUTPATIENT
Start: 2019-11-26 | End: 2022-04-13 | Stop reason: SDUPTHER

## 2019-12-12 DIAGNOSIS — E11.9 TYPE 2 DIABETES MELLITUS WITHOUT COMPLICATION, WITHOUT LONG-TERM CURRENT USE OF INSULIN (HCC): Primary | ICD-10-CM

## 2019-12-12 RX ORDER — BLOOD-GLUCOSE METER
1 KIT MISCELLANEOUS DAILY
Qty: 1 EACH | Refills: 1 | Status: SHIPPED | OUTPATIENT
Start: 2019-12-12 | End: 2020-05-27 | Stop reason: SDUPTHER

## 2019-12-12 RX ORDER — LANCETS 21 GAUGE
EACH MISCELLANEOUS DAILY
Qty: 50 EACH | Refills: 5 | Status: SHIPPED | OUTPATIENT
Start: 2019-12-12 | End: 2020-05-27 | Stop reason: SDUPTHER

## 2020-01-01 NOTE — PROGRESS NOTES
Assessment/Plan:    Type 2 diabetes mellitus ([place] [address] )  Lab Results   Component Value Date    HGBA1C 9 0 (H) 01/10/2019     PT doing better with diet and is exercising regularly  REC: increase Jardiance to 25mg qd  Cont present care  Recheck labs and f/u 3m - earlier if home BGs remain elevated    Gout  Stable  Had brief episode several months ago  Recheck 3m    Hyperlipidemia  At goal  Cont to monitor  Recheck 3m       Diagnoses and all orders for this visit:    Type 2 diabetes mellitus without complication, without long-term current use of insulin ([facility])  -     Basic metabolic panel; Future  -     Hemoglobin A1C; Future  -     Empagliflozin (JARDIANCE) 25 MG TABS; Take 1 tablet (25 mg total) by mouth every morning    Gout, unspecified cause, unspecified chronicity, unspecified site    Mixed hyperlipidemia          Subjective:      Patient ID: Judah Barnes is a 37 y o  male  f/u multiple med issues  - pt was not following diet  A1C increased to 9 0  Since the beginning of the month, he has been exercising every day and is doing better with his diet  Overdue for eye exam  - pt denies  Cp, palp, lightheadedness or other CV symptoms with or without exertion  - compliant with Jardiance and metformin  Cannot tolerate higher dose of metformin due to GI upset  - no other concerns        The following portions of the patient's history were reviewed and updated as appropriate:   He  has a past medical history of Diabetes mellitus ([place] [address] ) and GERD (gastroesophageal reflux disease)  He   Patient Active Problem List    Diagnosis Date Noted    Arthralgia of right knee 11/30/2015    GERD without esophagitis 11/14/2014    Hyperlipidemia 01/15/2014    Type 2 diabetes mellitus ([place] [address] ) 01/14/2014    Gout 12/04/2013     He  has a past surgical history that includes Upper endoscopy w/ esophageal manometry; pr biopsy/excision, lymph node(s) (Right, 9/15/2016);  Chest wall biopsy (N/A, 9/15/2016); and Trunk skin lesion excisional biopsy  He  reports that he has never smoked  He has never used smokeless tobacco  He reports that he does not drink alcohol or use drugs  Current Outpatient Prescriptions   Medication Sig Dispense Refill    atorvastatin (LIPITOR) 10 mg tablet TAKE 1 TABLET DAILY 90 tablet 1    JULISA MICROLET LANCETS lancets by Does not apply route 3 (three) times a day      CONTOUR NEXT TEST test strip TEST THREE TIMES A  each 1    indomethacin (INDOCIN) 50 mg capsule TAKE 1 CAPSULE EVERY 8 HOURS AS NEEDED 270 capsule 1    metFORMIN (GLUCOPHAGE) 500 mg tablet TAKE 1 TABLET TWICE A  tablet 1    omeprazole (PriLOSEC) 40 MG capsule TAKE 1 CAPSULE DAILY 90 capsule 3    Empagliflozin (JARDIANCE) 25 MG TABS Take 1 tablet (25 mg total) by mouth every morning 30 tablet 5     No current facility-administered medications for this visit  He has No Known Allergies       Review of Systems   Constitutional: Negative  HENT: Negative  Eyes: Negative  Respiratory: Negative  Cardiovascular: Negative  Gastrointestinal: Negative  Endocrine: Negative  Genitourinary: Negative  Musculoskeletal: Negative  Skin: Negative  Allergic/Immunologic: Negative  Neurological: Negative  Hematological: Negative  Psychiatric/Behavioral: Negative  Objective:      /80   Pulse 88   Temp (!) 95 6 °F (35 3 °C)   Ht 5' 7" (1 702 m)   Wt 106 kg (234 lb 9 6 oz)   BMI 36 74 kg/m²          Physical Exam   Constitutional: He is oriented to person, place, and time  He appears well-developed and well-nourished  HENT:   Head: Normocephalic and atraumatic  Right Ear: External ear normal    Left Ear: External ear normal    Nose: Nose normal    Mouth/Throat: Oropharynx is clear and moist    Eyes: Pupils are equal, round, and reactive to light  Conjunctivae and EOM are normal    Neck: Normal range of motion  Neck supple  No thyromegaly present     Cardiovascular: Normal rate, regular rhythm, normal heart sounds and intact distal pulses  Pulses are no weak pulses  No murmur heard  Pulses:       Dorsalis pedis pulses are 2+ on the right side, and 2+ on the left side  Pulmonary/Chest: Effort normal and breath sounds normal    Abdominal: Soft  Bowel sounds are normal  He exhibits no distension and no mass  There is no tenderness  Musculoskeletal: Normal range of motion  He exhibits no edema or tenderness  Feet:   Right Foot:   Skin Integrity: Negative for ulcer, skin breakdown, erythema, warmth, callus or dry skin  Left Foot:   Skin Integrity: Negative for ulcer, skin breakdown, erythema, warmth, callus or dry skin  Lymphadenopathy:     He has no cervical adenopathy  Neurological: He is alert and oriented to person, place, and time  No cranial nerve deficit  He exhibits normal muscle tone  Coordination normal    Skin: Skin is warm  Psychiatric: He has a normal mood and affect  His behavior is normal  Judgment and thought content normal    Vitals reviewed  Patient's shoes and socks removed  Right Foot/Ankle   Right Foot Inspection  Skin Exam: skin normal and skin intact no dry skin, no warmth, no callus, no erythema, no maceration, no abnormal color, no pre-ulcer, no ulcer and no callus                          Toe Exam: ROM and strength within normal limits  Sensory     Proprioception: intact   Monofilament testing: intact  Vascular  Capillary refills: < 3 seconds  The right DP pulse is 2+  Left Foot/Ankle  Left Foot Inspection  Skin Exam: skin normal and skin intactno dry skin, no warmth, no erythema, no maceration, normal color, no pre-ulcer, no ulcer and no callus                         Toe Exam: ROM and strength within normal limits                   Sensory     Proprioception: intact  Monofilament: intact  Vascular  Capillary refills: < 3 seconds  The left DP pulse is 2+  Assign Risk Category:  No deformity present; No loss of protective sensation;  No weak pulses Risk: 0 Reviewed records from [facility] - was given Benadryl x1, NS bolus x1 @ 20cc/kg, CBC with WBC of 22.36 NT 54  and BMP WNL, Xray of abdomen showed gaseous distention of the transverse colon with could represent ileus, moderate fecal retention in the rectosigmoid colon. Tarah Ugalde MD

## 2020-01-08 DIAGNOSIS — E11.9 TYPE 2 DIABETES MELLITUS WITHOUT COMPLICATION, WITHOUT LONG-TERM CURRENT USE OF INSULIN (HCC): ICD-10-CM

## 2020-01-23 DIAGNOSIS — E11.9 TYPE 2 DIABETES MELLITUS WITHOUT COMPLICATION, WITHOUT LONG-TERM CURRENT USE OF INSULIN (HCC): ICD-10-CM

## 2020-01-23 RX ORDER — EMPAGLIFLOZIN 25 MG/1
TABLET, FILM COATED ORAL
Qty: 90 TABLET | Refills: 0 | Status: SHIPPED | OUTPATIENT
Start: 2020-01-23 | End: 2020-04-21

## 2020-02-09 DIAGNOSIS — E78.2 MIXED HYPERLIPIDEMIA: ICD-10-CM

## 2020-02-10 RX ORDER — ATORVASTATIN CALCIUM 10 MG/1
TABLET, FILM COATED ORAL
Qty: 90 TABLET | Refills: 4 | Status: SHIPPED | OUTPATIENT
Start: 2020-02-10 | End: 2021-06-02

## 2020-03-24 ENCOUNTER — APPOINTMENT (OUTPATIENT)
Dept: LAB | Facility: CLINIC | Age: 45
End: 2020-03-24
Payer: COMMERCIAL

## 2020-03-24 DIAGNOSIS — E78.2 MIXED HYPERLIPIDEMIA: ICD-10-CM

## 2020-03-24 DIAGNOSIS — E11.9 TYPE 2 DIABETES MELLITUS WITHOUT COMPLICATION, WITHOUT LONG-TERM CURRENT USE OF INSULIN (HCC): ICD-10-CM

## 2020-03-24 DIAGNOSIS — K21.9 GERD WITHOUT ESOPHAGITIS: ICD-10-CM

## 2020-03-24 LAB
ALBUMIN SERPL BCP-MCNC: 4 G/DL (ref 3.5–5)
ALP SERPL-CCNC: 58 U/L (ref 46–116)
ALT SERPL W P-5'-P-CCNC: 38 U/L (ref 12–78)
ANION GAP SERPL CALCULATED.3IONS-SCNC: 0 MMOL/L (ref 4–13)
AST SERPL W P-5'-P-CCNC: 22 U/L (ref 5–45)
BASOPHILS # BLD AUTO: 0.03 THOUSANDS/ΜL (ref 0–0.1)
BASOPHILS NFR BLD AUTO: 1 % (ref 0–1)
BILIRUB SERPL-MCNC: 0.71 MG/DL (ref 0.2–1)
BUN SERPL-MCNC: 21 MG/DL (ref 5–25)
CALCIUM SERPL-MCNC: 9.5 MG/DL (ref 8.3–10.1)
CHLORIDE SERPL-SCNC: 106 MMOL/L (ref 100–108)
CHOLEST SERPL-MCNC: 137 MG/DL (ref 50–200)
CO2 SERPL-SCNC: 33 MMOL/L (ref 21–32)
CREAT SERPL-MCNC: 0.95 MG/DL (ref 0.6–1.3)
CREAT UR-MCNC: 92 MG/DL
EOSINOPHIL # BLD AUTO: 0.2 THOUSAND/ΜL (ref 0–0.61)
EOSINOPHIL NFR BLD AUTO: 4 % (ref 0–6)
ERYTHROCYTE [DISTWIDTH] IN BLOOD BY AUTOMATED COUNT: 12.3 % (ref 11.6–15.1)
EST. AVERAGE GLUCOSE BLD GHB EST-MCNC: 128 MG/DL
GFR SERPL CREATININE-BSD FRML MDRD: 97 ML/MIN/1.73SQ M
GLUCOSE P FAST SERPL-MCNC: 105 MG/DL (ref 65–99)
HBA1C MFR BLD: 6.1 %
HCT VFR BLD AUTO: 48.3 % (ref 36.5–49.3)
HDLC SERPL-MCNC: 47 MG/DL
HGB BLD-MCNC: 15.8 G/DL (ref 12–17)
IMM GRANULOCYTES # BLD AUTO: 0.02 THOUSAND/UL (ref 0–0.2)
IMM GRANULOCYTES NFR BLD AUTO: 0 % (ref 0–2)
LDLC SERPL CALC-MCNC: 69 MG/DL (ref 0–100)
LYMPHOCYTES # BLD AUTO: 2.29 THOUSANDS/ΜL (ref 0.6–4.47)
LYMPHOCYTES NFR BLD AUTO: 41 % (ref 14–44)
MCH RBC QN AUTO: 29.7 PG (ref 26.8–34.3)
MCHC RBC AUTO-ENTMCNC: 32.7 G/DL (ref 31.4–37.4)
MCV RBC AUTO: 91 FL (ref 82–98)
MICROALBUMIN UR-MCNC: 5.9 MG/L (ref 0–20)
MICROALBUMIN/CREAT 24H UR: 6 MG/G CREATININE (ref 0–30)
MONOCYTES # BLD AUTO: 0.42 THOUSAND/ΜL (ref 0.17–1.22)
MONOCYTES NFR BLD AUTO: 8 % (ref 4–12)
NEUTROPHILS # BLD AUTO: 2.6 THOUSANDS/ΜL (ref 1.85–7.62)
NEUTS SEG NFR BLD AUTO: 46 % (ref 43–75)
NONHDLC SERPL-MCNC: 90 MG/DL
NRBC BLD AUTO-RTO: 0 /100 WBCS
PLATELET # BLD AUTO: 185 THOUSANDS/UL (ref 149–390)
PMV BLD AUTO: 10.5 FL (ref 8.9–12.7)
POTASSIUM SERPL-SCNC: 4 MMOL/L (ref 3.5–5.3)
PROT SERPL-MCNC: 7.2 G/DL (ref 6.4–8.2)
RBC # BLD AUTO: 5.32 MILLION/UL (ref 3.88–5.62)
SODIUM SERPL-SCNC: 139 MMOL/L (ref 136–145)
TRIGL SERPL-MCNC: 105 MG/DL
WBC # BLD AUTO: 5.56 THOUSAND/UL (ref 4.31–10.16)

## 2020-03-24 PROCEDURE — 82043 UR ALBUMIN QUANTITATIVE: CPT

## 2020-03-24 PROCEDURE — 82570 ASSAY OF URINE CREATININE: CPT

## 2020-03-24 PROCEDURE — 83036 HEMOGLOBIN GLYCOSYLATED A1C: CPT

## 2020-03-24 PROCEDURE — 80061 LIPID PANEL: CPT

## 2020-03-24 PROCEDURE — 85025 COMPLETE CBC W/AUTO DIFF WBC: CPT

## 2020-03-24 PROCEDURE — 36415 COLL VENOUS BLD VENIPUNCTURE: CPT

## 2020-03-24 PROCEDURE — 3061F NEG MICROALBUMINURIA REV: CPT | Performed by: FAMILY MEDICINE

## 2020-03-24 PROCEDURE — 3044F HG A1C LEVEL LT 7.0%: CPT | Performed by: FAMILY MEDICINE

## 2020-03-24 PROCEDURE — 80053 COMPREHEN METABOLIC PANEL: CPT

## 2020-04-07 DIAGNOSIS — E11.9 TYPE 2 DIABETES MELLITUS WITHOUT COMPLICATION, WITHOUT LONG-TERM CURRENT USE OF INSULIN (HCC): ICD-10-CM

## 2020-04-21 DIAGNOSIS — E11.9 TYPE 2 DIABETES MELLITUS WITHOUT COMPLICATION, WITHOUT LONG-TERM CURRENT USE OF INSULIN (HCC): ICD-10-CM

## 2020-04-21 RX ORDER — EMPAGLIFLOZIN 25 MG/1
TABLET, FILM COATED ORAL
Qty: 90 TABLET | Refills: 3 | Status: SHIPPED | OUTPATIENT
Start: 2020-04-21 | End: 2021-06-02

## 2020-05-26 LAB
LEFT EYE DIABETIC RETINOPATHY: NORMAL
RIGHT EYE DIABETIC RETINOPATHY: NORMAL

## 2020-05-27 ENCOUNTER — OFFICE VISIT (OUTPATIENT)
Dept: FAMILY MEDICINE CLINIC | Facility: CLINIC | Age: 45
End: 2020-05-27
Payer: COMMERCIAL

## 2020-05-27 VITALS
HEART RATE: 74 BPM | DIASTOLIC BLOOD PRESSURE: 80 MMHG | WEIGHT: 212 LBS | TEMPERATURE: 98.5 F | BODY MASS INDEX: 33.27 KG/M2 | HEIGHT: 67 IN | SYSTOLIC BLOOD PRESSURE: 120 MMHG

## 2020-05-27 DIAGNOSIS — E11.9 TYPE 2 DIABETES MELLITUS WITHOUT COMPLICATION, WITHOUT LONG-TERM CURRENT USE OF INSULIN (HCC): ICD-10-CM

## 2020-05-27 DIAGNOSIS — M10.9 GOUT, UNSPECIFIED CAUSE, UNSPECIFIED CHRONICITY, UNSPECIFIED SITE: ICD-10-CM

## 2020-05-27 DIAGNOSIS — Z00.00 ANNUAL PHYSICAL EXAM: Primary | ICD-10-CM

## 2020-05-27 DIAGNOSIS — E78.2 MIXED HYPERLIPIDEMIA: ICD-10-CM

## 2020-05-27 DIAGNOSIS — K21.9 GERD WITHOUT ESOPHAGITIS: ICD-10-CM

## 2020-05-27 PROCEDURE — 3008F BODY MASS INDEX DOCD: CPT | Performed by: FAMILY MEDICINE

## 2020-05-27 PROCEDURE — 3044F HG A1C LEVEL LT 7.0%: CPT | Performed by: FAMILY MEDICINE

## 2020-05-27 PROCEDURE — 99396 PREV VISIT EST AGE 40-64: CPT | Performed by: FAMILY MEDICINE

## 2020-05-27 RX ORDER — BLOOD-GLUCOSE METER
1 KIT MISCELLANEOUS DAILY
Qty: 1 EACH | Refills: 1 | Status: SHIPPED | OUTPATIENT
Start: 2020-05-27 | End: 2020-12-14 | Stop reason: SDUPTHER

## 2020-05-27 RX ORDER — LANCETS 21 GAUGE
EACH MISCELLANEOUS DAILY
Qty: 50 EACH | Refills: 5 | Status: SHIPPED | OUTPATIENT
Start: 2020-05-27 | End: 2020-12-14 | Stop reason: SDUPTHER

## 2020-07-31 NOTE — PROGRESS NOTES
Assessment/Plan:    GERD without esophagitis  Doing well  Pt off meds at this point  Cont to monitor  Recheck 6m    Type 2 diabetes mellitus ([facility] [address] )    Lab Results   Component Value Date    HGBA1C 5 9 08/02/2019     Remains at goal  Cont present exercise and diet plan  Cont to work on weight loss  BMI Counseling: Body mass index is 33 54 kg/m²  The BMI is above normal  Nutrition recommendations include consuming healthier snacks, moderation in carbohydrate intake and increasing intake of lean protein  Exercise recommendations include moderate aerobic physical activity for 150 minutes/week  Recheck 6m      Hyperlipidemia  Has been at goal  Cont atorvastatin  Check labs in Feb   Recheck 6m       Diagnoses and all orders for this visit:    GERD without esophagitis  -     CBC and differential; Future    Type 2 diabetes mellitus without complication, without long-term current use of insulin ([facility])  -     POCT hemoglobin A1c  -     Comprehensive metabolic panel; Future  -     Hemoglobin A1C; Future  -     Lipid panel; Future  -     Microalbumin / creatinine urine ratio; Future  -     Ambulatory referral to Optometry; Future    Mixed hyperlipidemia  -     Lipid panel; Future    Immunization due  -     influenza vaccine, [id number], quadrivalent, recombinant, PF, 0 5 mL, for patients 18 yr+ (FLUBLOK)          Subjective:      Patient ID: Khoi Wood is a 40 y o  male  f/u multiple med issues  - pt states that he is doing well  - pt remains compliant with diet and exercise  Still does eliptical 45min a day and watches his diet  A1C in office today = 6 8  Pt compliant with meds  Needs script to have his eye exam  - pt denies Cp, palp, lightheadedness or other CV symptoms with or without exertion  - no GI or  complaints  - Has 2 foot lesions he would like checked  Sl uncomfortable     - no other concerns      The following portions of the patient's history were reviewed and updated as appropriate:   He  has a past Hospitalist medical history of Diabetes mellitus ([facility] ) and GERD (gastroesophageal reflux disease)  He   Patient Active Problem List    Diagnosis Date Noted    Arthralgia of right knee 11/30/2015    GERD without esophagitis 11/14/2014    Hyperlipidemia 01/15/2014    Type 2 diabetes mellitus ([facility] ) 01/14/2014    Gout 12/04/2013     He  has a past surgical history that includes Upper endoscopy w/ esophageal manometry; pr biopsy/excision, lymph node(s) (Right, 9/15/2016); Chest wall biopsy (N/A, 9/15/2016); and Trunk skin lesion excisional biopsy  He  reports that he has never smoked  He has never used smokeless tobacco  He reports that he does not drink alcohol or use drugs  Current Outpatient Medications   Medication Sig Dispense Refill    atorvastatin (LIPITOR) 10 mg tablet TAKE 1 TABLET DAILY 90 tablet 1    JULISA MICROLET LANCETS lancets by Does not apply route 3 (three) times a day      CONTOUR NEXT TEST test strip TEST THREE TIMES A  each 1    indomethacin (INDOCIN) 50 mg capsule TAKE 1 CAPSULE EVERY 8 HOURS AS NEEDED 270 capsule 1    JARDIANCE 25 MG TABS TAKE 1 TABLET EVERY MORNING 90 tablet 1    metFORMIN (GLUCOPHAGE) 500 mg tablet TAKE 1 TABLET TWICE A  tablet 1    omeprazole (PriLOSEC) 40 MG capsule TAKE 1 CAPSULE DAILY 90 capsule 3     No current facility-administered medications for this visit  He has No Known Allergies       Review of Systems   Constitutional: Negative  HENT: Negative  Eyes: Negative  Respiratory: Negative  Cardiovascular: Negative  Gastrointestinal: Negative  Endocrine: Negative  Genitourinary: Negative  Musculoskeletal: Negative  Skin:        2 foot lesions   Allergic/Immunologic: Negative  Neurological: Negative  Hematological: Negative  Psychiatric/Behavioral: Negative            Objective:      /72   Pulse 70   Temp 98 4 °F (36 9 °C)   Ht 5' 7" (1 702 m)   Wt 97 1 kg (214 lb 2 oz)   BMI 33 54 kg/m²          Physical Exam Constitutional: He is oriented to person, place, and time  He appears well-developed and well-nourished  HENT:   Head: Normocephalic and atraumatic  Right Ear: External ear normal    Left Ear: External ear normal    Nose: Nose normal    Mouth/Throat: Oropharynx is clear and moist    Eyes: Pupils are equal, round, and reactive to light  Conjunctivae and EOM are normal    Neck: Normal range of motion  Neck supple  No thyromegaly present  Cardiovascular: Normal rate, regular rhythm, normal heart sounds and intact distal pulses  Pulses are no weak pulses  No murmur heard  Pulses:       Dorsalis pedis pulses are 2+ on the right side, and 2+ on the left side  Pulmonary/Chest: Effort normal and breath sounds normal    Abdominal: Soft  Bowel sounds are normal  He exhibits no distension and no mass  There is no tenderness  Musculoskeletal: Normal range of motion  He exhibits no edema or tenderness  Feet:   Right Foot:   Skin Integrity: Negative for ulcer, skin breakdown, erythema, warmth, callus (R corn over lateral aspect of 5th MTP) or dry skin  Left Foot:   Skin Integrity: Negative for ulcer, skin breakdown, erythema, warmth, callus or dry skin  Lymphadenopathy:     He has no cervical adenopathy  Neurological: He is alert and oriented to person, place, and time  No cranial nerve deficit  He exhibits normal muscle tone  Coordination normal    Skin: Skin is warm  Psychiatric: He has a normal mood and affect  His behavior is normal  Judgment and thought content normal    Vitals reviewed  Patient's shoes and socks removed  Right Foot/Ankle   Right Foot Inspection  Skin Exam: skin normal and skin intact no dry skin, no warmth, no callus (R corn over lateral aspect of 5th MTP), no erythema, no maceration, no abnormal color, no pre-ulcer, no ulcer and no callus (R corn over lateral aspect of 5th MTP)                          Toe Exam: ROM and strength within normal limits  Sensory   Vibration: intact    Monofilament testing: intact  Vascular  Capillary refills: < 3 seconds  The right DP pulse is 2+  Left Foot/Ankle  Left Foot Inspection  Skin Exam: skin normal and skin intactno dry skin, no warmth, no erythema, no maceration, normal color, no pre-ulcer, no ulcer and no callus                         Toe Exam: ROM and strength within normal limits                   Sensory   Vibration: intact    Monofilament: intact  Vascular  Capillary refills: < 3 seconds  The left DP pulse is 2+  Assign Risk Category:  No deformity present; No loss of protective sensation;  No weak pulses       Risk: 0

## 2020-12-09 ENCOUNTER — TRANSCRIBE ORDERS (OUTPATIENT)
Dept: LAB | Facility: CLINIC | Age: 45
End: 2020-12-09

## 2020-12-09 ENCOUNTER — LAB (OUTPATIENT)
Dept: LAB | Facility: CLINIC | Age: 45
End: 2020-12-09
Payer: COMMERCIAL

## 2020-12-09 DIAGNOSIS — E11.9 TYPE 2 DIABETES MELLITUS WITHOUT COMPLICATION, WITHOUT LONG-TERM CURRENT USE OF INSULIN (HCC): ICD-10-CM

## 2020-12-09 DIAGNOSIS — M10.9 GOUT, UNSPECIFIED CAUSE, UNSPECIFIED CHRONICITY, UNSPECIFIED SITE: ICD-10-CM

## 2020-12-09 LAB
ALBUMIN SERPL BCP-MCNC: 4.2 G/DL (ref 3.5–5)
ALP SERPL-CCNC: 53 U/L (ref 46–116)
ALT SERPL W P-5'-P-CCNC: 36 U/L (ref 12–78)
ANION GAP SERPL CALCULATED.3IONS-SCNC: 3 MMOL/L (ref 4–13)
AST SERPL W P-5'-P-CCNC: 14 U/L (ref 5–45)
BASOPHILS # BLD AUTO: 0.04 THOUSANDS/ΜL (ref 0–0.1)
BASOPHILS NFR BLD AUTO: 1 % (ref 0–1)
BILIRUB SERPL-MCNC: 0.71 MG/DL (ref 0.2–1)
BUN SERPL-MCNC: 21 MG/DL (ref 5–25)
CALCIUM SERPL-MCNC: 9.7 MG/DL (ref 8.3–10.1)
CHLORIDE SERPL-SCNC: 110 MMOL/L (ref 100–108)
CHOLEST SERPL-MCNC: 184 MG/DL (ref 50–200)
CO2 SERPL-SCNC: 31 MMOL/L (ref 21–32)
CREAT SERPL-MCNC: 1.01 MG/DL (ref 0.6–1.3)
CREAT UR-MCNC: 127 MG/DL
EOSINOPHIL # BLD AUTO: 0.3 THOUSAND/ΜL (ref 0–0.61)
EOSINOPHIL NFR BLD AUTO: 4 % (ref 0–6)
ERYTHROCYTE [DISTWIDTH] IN BLOOD BY AUTOMATED COUNT: 12.6 % (ref 11.6–15.1)
EST. AVERAGE GLUCOSE BLD GHB EST-MCNC: 160 MG/DL
GFR SERPL CREATININE-BSD FRML MDRD: 89 ML/MIN/1.73SQ M
GLUCOSE P FAST SERPL-MCNC: 136 MG/DL (ref 65–99)
HBA1C MFR BLD: 7.2 %
HCT VFR BLD AUTO: 51.2 % (ref 36.5–49.3)
HDLC SERPL-MCNC: 48 MG/DL
HGB BLD-MCNC: 16.7 G/DL (ref 12–17)
IMM GRANULOCYTES # BLD AUTO: 0.03 THOUSAND/UL (ref 0–0.2)
IMM GRANULOCYTES NFR BLD AUTO: 0 % (ref 0–2)
LDLC SERPL CALC-MCNC: 105 MG/DL (ref 0–100)
LYMPHOCYTES # BLD AUTO: 2.45 THOUSANDS/ΜL (ref 0.6–4.47)
LYMPHOCYTES NFR BLD AUTO: 36 % (ref 14–44)
MCH RBC QN AUTO: 29.2 PG (ref 26.8–34.3)
MCHC RBC AUTO-ENTMCNC: 32.6 G/DL (ref 31.4–37.4)
MCV RBC AUTO: 90 FL (ref 82–98)
MICROALBUMIN UR-MCNC: 10.7 MG/L (ref 0–20)
MICROALBUMIN/CREAT 24H UR: 8 MG/G CREATININE (ref 0–30)
MONOCYTES # BLD AUTO: 0.53 THOUSAND/ΜL (ref 0.17–1.22)
MONOCYTES NFR BLD AUTO: 8 % (ref 4–12)
NEUTROPHILS # BLD AUTO: 3.44 THOUSANDS/ΜL (ref 1.85–7.62)
NEUTS SEG NFR BLD AUTO: 51 % (ref 43–75)
NONHDLC SERPL-MCNC: 136 MG/DL
NRBC BLD AUTO-RTO: 0 /100 WBCS
PLATELET # BLD AUTO: 210 THOUSANDS/UL (ref 149–390)
PMV BLD AUTO: 10.2 FL (ref 8.9–12.7)
POTASSIUM SERPL-SCNC: 4.3 MMOL/L (ref 3.5–5.3)
PROT SERPL-MCNC: 7.6 G/DL (ref 6.4–8.2)
RBC # BLD AUTO: 5.71 MILLION/UL (ref 3.88–5.62)
SODIUM SERPL-SCNC: 144 MMOL/L (ref 136–145)
TRIGL SERPL-MCNC: 154 MG/DL
URATE SERPL-MCNC: 6 MG/DL (ref 4.2–8)
WBC # BLD AUTO: 6.79 THOUSAND/UL (ref 4.31–10.16)

## 2020-12-09 PROCEDURE — 3051F HG A1C>EQUAL 7.0%<8.0%: CPT | Performed by: FAMILY MEDICINE

## 2020-12-09 PROCEDURE — 80053 COMPREHEN METABOLIC PANEL: CPT

## 2020-12-09 PROCEDURE — 82043 UR ALBUMIN QUANTITATIVE: CPT

## 2020-12-09 PROCEDURE — 82570 ASSAY OF URINE CREATININE: CPT

## 2020-12-09 PROCEDURE — 84550 ASSAY OF BLOOD/URIC ACID: CPT

## 2020-12-09 PROCEDURE — 85025 COMPLETE CBC W/AUTO DIFF WBC: CPT

## 2020-12-09 PROCEDURE — 80061 LIPID PANEL: CPT

## 2020-12-09 PROCEDURE — 36415 COLL VENOUS BLD VENIPUNCTURE: CPT

## 2020-12-09 PROCEDURE — 83036 HEMOGLOBIN GLYCOSYLATED A1C: CPT

## 2020-12-09 PROCEDURE — 3061F NEG MICROALBUMINURIA REV: CPT | Performed by: FAMILY MEDICINE

## 2020-12-14 ENCOUNTER — OFFICE VISIT (OUTPATIENT)
Dept: FAMILY MEDICINE CLINIC | Facility: CLINIC | Age: 45
End: 2020-12-14
Payer: COMMERCIAL

## 2020-12-14 VITALS
TEMPERATURE: 98.2 F | BODY MASS INDEX: 35.94 KG/M2 | WEIGHT: 229 LBS | HEIGHT: 67 IN | HEART RATE: 76 BPM | SYSTOLIC BLOOD PRESSURE: 116 MMHG | OXYGEN SATURATION: 98 % | DIASTOLIC BLOOD PRESSURE: 72 MMHG

## 2020-12-14 DIAGNOSIS — N52.9 ERECTILE DYSFUNCTION, UNSPECIFIED ERECTILE DYSFUNCTION TYPE: ICD-10-CM

## 2020-12-14 DIAGNOSIS — M10.9 GOUT, UNSPECIFIED CAUSE, UNSPECIFIED CHRONICITY, UNSPECIFIED SITE: ICD-10-CM

## 2020-12-14 DIAGNOSIS — E78.2 MIXED HYPERLIPIDEMIA: ICD-10-CM

## 2020-12-14 DIAGNOSIS — E11.9 TYPE 2 DIABETES MELLITUS WITHOUT COMPLICATION, WITHOUT LONG-TERM CURRENT USE OF INSULIN (HCC): Primary | ICD-10-CM

## 2020-12-14 PROCEDURE — 1036F TOBACCO NON-USER: CPT | Performed by: FAMILY MEDICINE

## 2020-12-14 PROCEDURE — 3008F BODY MASS INDEX DOCD: CPT | Performed by: FAMILY MEDICINE

## 2020-12-14 PROCEDURE — 99214 OFFICE O/P EST MOD 30 MIN: CPT | Performed by: FAMILY MEDICINE

## 2020-12-14 RX ORDER — BLOOD-GLUCOSE METER
1 KIT MISCELLANEOUS DAILY
Qty: 1 EACH | Refills: 1 | Status: SHIPPED | OUTPATIENT
Start: 2020-12-14

## 2020-12-14 RX ORDER — SILDENAFIL 50 MG/1
50 TABLET, FILM COATED ORAL DAILY PRN
Qty: 10 TABLET | Refills: 5 | Status: SHIPPED | OUTPATIENT
Start: 2020-12-14 | End: 2021-01-07 | Stop reason: SDUPTHER

## 2020-12-14 RX ORDER — LANCETS 21 GAUGE
EACH MISCELLANEOUS DAILY
Qty: 50 EACH | Refills: 5 | Status: SHIPPED | OUTPATIENT
Start: 2020-12-14 | End: 2020-12-30 | Stop reason: SDUPTHER

## 2020-12-14 RX ORDER — BLOOD SUGAR DIAGNOSTIC
STRIP MISCELLANEOUS
Qty: 50 EACH | Refills: 5 | Status: SHIPPED | OUTPATIENT
Start: 2020-12-14 | End: 2020-12-30 | Stop reason: SDUPTHER

## 2020-12-30 DIAGNOSIS — E11.9 TYPE 2 DIABETES MELLITUS WITHOUT COMPLICATION, WITHOUT LONG-TERM CURRENT USE OF INSULIN (HCC): ICD-10-CM

## 2020-12-30 RX ORDER — LANCETS 21 GAUGE
EACH MISCELLANEOUS DAILY
Qty: 90 EACH | Refills: 3 | Status: SHIPPED | OUTPATIENT
Start: 2020-12-30 | End: 2022-03-04

## 2020-12-30 RX ORDER — BLOOD SUGAR DIAGNOSTIC
STRIP MISCELLANEOUS
Qty: 100 EACH | Refills: 3 | Status: SHIPPED | OUTPATIENT
Start: 2020-12-30 | End: 2022-03-04

## 2021-01-07 DIAGNOSIS — N52.9 ERECTILE DYSFUNCTION, UNSPECIFIED ERECTILE DYSFUNCTION TYPE: ICD-10-CM

## 2021-01-07 RX ORDER — SILDENAFIL 50 MG/1
50 TABLET, FILM COATED ORAL DAILY PRN
Qty: 30 TABLET | Refills: 1 | Status: SHIPPED | OUTPATIENT
Start: 2021-01-07 | End: 2021-05-19

## 2021-05-19 DIAGNOSIS — N52.9 ERECTILE DYSFUNCTION, UNSPECIFIED ERECTILE DYSFUNCTION TYPE: ICD-10-CM

## 2021-05-19 RX ORDER — SILDENAFIL 50 MG/1
TABLET, FILM COATED ORAL
Qty: 30 TABLET | Refills: 3 | Status: SHIPPED | OUTPATIENT
Start: 2021-05-19 | End: 2022-03-15

## 2021-06-01 DIAGNOSIS — E11.9 TYPE 2 DIABETES MELLITUS WITHOUT COMPLICATION, WITHOUT LONG-TERM CURRENT USE OF INSULIN (HCC): ICD-10-CM

## 2021-06-01 DIAGNOSIS — E78.2 MIXED HYPERLIPIDEMIA: ICD-10-CM

## 2021-06-02 RX ORDER — ATORVASTATIN CALCIUM 10 MG/1
TABLET, FILM COATED ORAL
Qty: 90 TABLET | Refills: 3 | Status: SHIPPED | OUTPATIENT
Start: 2021-06-02 | End: 2022-07-12

## 2021-06-02 RX ORDER — EMPAGLIFLOZIN 25 MG/1
TABLET, FILM COATED ORAL
Qty: 90 TABLET | Refills: 3 | Status: SHIPPED | OUTPATIENT
Start: 2021-06-02

## 2021-06-04 ENCOUNTER — APPOINTMENT (OUTPATIENT)
Dept: LAB | Facility: CLINIC | Age: 46
End: 2021-06-04
Payer: COMMERCIAL

## 2021-06-04 ENCOUNTER — TRANSCRIBE ORDERS (OUTPATIENT)
Dept: LAB | Facility: CLINIC | Age: 46
End: 2021-06-04

## 2021-06-04 DIAGNOSIS — E11.9 TYPE 2 DIABETES MELLITUS WITHOUT COMPLICATION, WITHOUT LONG-TERM CURRENT USE OF INSULIN (HCC): ICD-10-CM

## 2021-06-04 DIAGNOSIS — M10.9 GOUT, UNSPECIFIED CAUSE, UNSPECIFIED CHRONICITY, UNSPECIFIED SITE: ICD-10-CM

## 2021-06-04 LAB
ALBUMIN SERPL BCP-MCNC: 4.2 G/DL (ref 3.5–5)
ALP SERPL-CCNC: 53 U/L (ref 46–116)
ALT SERPL W P-5'-P-CCNC: 54 U/L (ref 12–78)
ANION GAP SERPL CALCULATED.3IONS-SCNC: 1 MMOL/L (ref 4–13)
AST SERPL W P-5'-P-CCNC: 25 U/L (ref 5–45)
BASOPHILS # BLD AUTO: 0.04 THOUSANDS/ΜL (ref 0–0.1)
BASOPHILS NFR BLD AUTO: 1 % (ref 0–1)
BILIRUB SERPL-MCNC: 0.79 MG/DL (ref 0.2–1)
BUN SERPL-MCNC: 15 MG/DL (ref 5–25)
CALCIUM SERPL-MCNC: 9.5 MG/DL (ref 8.3–10.1)
CHLORIDE SERPL-SCNC: 104 MMOL/L (ref 100–108)
CHOLEST SERPL-MCNC: 176 MG/DL (ref 50–200)
CO2 SERPL-SCNC: 33 MMOL/L (ref 21–32)
CREAT SERPL-MCNC: 0.88 MG/DL (ref 0.6–1.3)
CREAT UR-MCNC: 93.8 MG/DL
EOSINOPHIL # BLD AUTO: 0.19 THOUSAND/ΜL (ref 0–0.61)
EOSINOPHIL NFR BLD AUTO: 3 % (ref 0–6)
ERYTHROCYTE [DISTWIDTH] IN BLOOD BY AUTOMATED COUNT: 12.4 % (ref 11.6–15.1)
EST. AVERAGE GLUCOSE BLD GHB EST-MCNC: 140 MG/DL
GFR SERPL CREATININE-BSD FRML MDRD: 103 ML/MIN/1.73SQ M
GLUCOSE P FAST SERPL-MCNC: 115 MG/DL (ref 65–99)
HBA1C MFR BLD: 6.5 %
HCT VFR BLD AUTO: 50.8 % (ref 36.5–49.3)
HDLC SERPL-MCNC: 51 MG/DL
HGB BLD-MCNC: 16.3 G/DL (ref 12–17)
IMM GRANULOCYTES # BLD AUTO: 0.02 THOUSAND/UL (ref 0–0.2)
IMM GRANULOCYTES NFR BLD AUTO: 0 % (ref 0–2)
LDLC SERPL CALC-MCNC: 96 MG/DL (ref 0–100)
LYMPHOCYTES # BLD AUTO: 2.16 THOUSANDS/ΜL (ref 0.6–4.47)
LYMPHOCYTES NFR BLD AUTO: 39 % (ref 14–44)
MCH RBC QN AUTO: 28.9 PG (ref 26.8–34.3)
MCHC RBC AUTO-ENTMCNC: 32.1 G/DL (ref 31.4–37.4)
MCV RBC AUTO: 90 FL (ref 82–98)
MICROALBUMIN UR-MCNC: 6.6 MG/L (ref 0–20)
MICROALBUMIN/CREAT 24H UR: 7 MG/G CREATININE (ref 0–30)
MONOCYTES # BLD AUTO: 0.43 THOUSAND/ΜL (ref 0.17–1.22)
MONOCYTES NFR BLD AUTO: 8 % (ref 4–12)
NEUTROPHILS # BLD AUTO: 2.72 THOUSANDS/ΜL (ref 1.85–7.62)
NEUTS SEG NFR BLD AUTO: 49 % (ref 43–75)
NONHDLC SERPL-MCNC: 125 MG/DL
NRBC BLD AUTO-RTO: 0 /100 WBCS
PLATELET # BLD AUTO: 185 THOUSANDS/UL (ref 149–390)
PMV BLD AUTO: 10.9 FL (ref 8.9–12.7)
POTASSIUM SERPL-SCNC: 4.2 MMOL/L (ref 3.5–5.3)
PROT SERPL-MCNC: 7.6 G/DL (ref 6.4–8.2)
RBC # BLD AUTO: 5.64 MILLION/UL (ref 3.88–5.62)
SODIUM SERPL-SCNC: 138 MMOL/L (ref 136–145)
TRIGL SERPL-MCNC: 143 MG/DL
WBC # BLD AUTO: 5.56 THOUSAND/UL (ref 4.31–10.16)

## 2021-06-04 PROCEDURE — 83036 HEMOGLOBIN GLYCOSYLATED A1C: CPT

## 2021-06-04 PROCEDURE — 82043 UR ALBUMIN QUANTITATIVE: CPT

## 2021-06-04 PROCEDURE — 82570 ASSAY OF URINE CREATININE: CPT

## 2021-06-04 PROCEDURE — 36415 COLL VENOUS BLD VENIPUNCTURE: CPT

## 2021-06-04 PROCEDURE — 80053 COMPREHEN METABOLIC PANEL: CPT

## 2021-06-04 PROCEDURE — 80061 LIPID PANEL: CPT

## 2021-06-04 PROCEDURE — 85025 COMPLETE CBC W/AUTO DIFF WBC: CPT

## 2021-07-02 ENCOUNTER — OFFICE VISIT (OUTPATIENT)
Dept: FAMILY MEDICINE CLINIC | Facility: CLINIC | Age: 46
End: 2021-07-02
Payer: COMMERCIAL

## 2021-07-02 VITALS
TEMPERATURE: 98.1 F | WEIGHT: 220 LBS | HEART RATE: 76 BPM | SYSTOLIC BLOOD PRESSURE: 120 MMHG | BODY MASS INDEX: 34.53 KG/M2 | DIASTOLIC BLOOD PRESSURE: 80 MMHG | HEIGHT: 67 IN

## 2021-07-02 DIAGNOSIS — E11.9 TYPE 2 DIABETES MELLITUS WITHOUT COMPLICATION, WITHOUT LONG-TERM CURRENT USE OF INSULIN (HCC): ICD-10-CM

## 2021-07-02 DIAGNOSIS — Z00.00 ANNUAL PHYSICAL EXAM: Primary | ICD-10-CM

## 2021-07-02 DIAGNOSIS — M10.9 GOUT, UNSPECIFIED CAUSE, UNSPECIFIED CHRONICITY, UNSPECIFIED SITE: ICD-10-CM

## 2021-07-02 PROCEDURE — 3725F SCREEN DEPRESSION PERFORMED: CPT | Performed by: FAMILY MEDICINE

## 2021-07-02 PROCEDURE — 3008F BODY MASS INDEX DOCD: CPT | Performed by: FAMILY MEDICINE

## 2021-07-02 PROCEDURE — 1036F TOBACCO NON-USER: CPT | Performed by: FAMILY MEDICINE

## 2021-07-02 PROCEDURE — 99396 PREV VISIT EST AGE 40-64: CPT | Performed by: FAMILY MEDICINE

## 2021-07-02 PROCEDURE — 99214 OFFICE O/P EST MOD 30 MIN: CPT | Performed by: FAMILY MEDICINE

## 2021-07-02 NOTE — ASSESSMENT & PLAN NOTE
Lab Results   Component Value Date    HGBA1C 6 5 (H) 06/04/2021   Chronic, improved  Patient will trial stopping metformin, with the understanding that if his A1c>7 on repeat he may need to restart it  Continue strict dietary adherence   Repeat A1c in 3 months and full labs in 6 months  Continue Jardiance 25mg QD

## 2021-07-02 NOTE — PROGRESS NOTES
[address]    NAME: Hollie Brasher  AGE: 55 y o  SEX: male  : 1975   DATE: 2021     Assessment and Plan:   Immunizations and preventive care screenings were discussed with patient today  Appropriate education was printed on patient's after visit summary  Counseling:  Dental Health: discussed importance of regular tooth brushing, flossing, and dental visits  · Exercise: the importance of regular exercise/physical activity was discussed  Recommend exercise 3-5 times per week for at least 30 minutes  BMI Counseling: Body mass index is 34 46 kg/m²  The BMI is above normal  Nutrition recommendations include decreasing portion sizes, encouraging healthy choices of fruits and vegetables and limiting drinks that contain sugar  Exercise recommendations include moderate physical activity 150 minutes/week, exercising 3-5 times per week and strength training exercises  Patient referred to PCP due to patient being overweight  BMI Counseling: Body mass index is 34 46 kg/m²  The BMI is above normal  Nutrition recommendations include 3-5 servings of fruits/vegetables daily  Return in about 6 months (around 2022) for Next scheduled follow up  Chief Complaint:     Chief Complaint   Patient presents with    Follow-up    Physical Exam      History of Present Illness:     Adult Annual Physical   Patient here for a comprehensive physical exam  The patient reports no problems  Diet and Physical Activity  · Diet/Nutrition: diabetic diet  · Exercise: walking  Depression Screening  PHQ-9 Depression Screening    PHQ-9:   Frequency of the following problems over the past two weeks:      Little interest or pleasure in doing things: 0 - not at all  Feeling down, depressed, or hopeless: 0 - not at all  PHQ-2 Score: 0       General Health  · Sleep: sleeps well     · Hearing: normal - bilateral   · Vision: goes for regular eye exams  · Dental: regular dental visits   Health  · Symptoms include: erectile dysfunction     Review of Systems:     Review of Systems   Constitutional: Negative for activity change, chills and fever  HENT: Negative for congestion, rhinorrhea and sore throat  Eyes: Negative for visual disturbance  Respiratory: Negative for cough, shortness of breath and wheezing  Cardiovascular: Negative for chest pain and palpitations  Gastrointestinal: Negative for abdominal pain, blood in stool, constipation, diarrhea, nausea and vomiting  Genitourinary: Negative for dysuria  Musculoskeletal: Negative for arthralgias and myalgias  Skin: Negative for rash  Neurological: Negative for dizziness, weakness and headaches  All other systems reviewed and are negative       Past Medical History:     Past Medical History:   Diagnosis Date    [facility]     elevated bloodsugars     GERD (gastroesophageal reflux disease)       Past Surgical History:     Past Surgical History:   Procedure Laterality Date    CHEST WALL BIOPSY N/A 9/15/2016    Procedure: CHEST LESION EXCISION ;  Surgeon: Anastacio Heard MD;  [facility];  Service:     LA BIOPSY/EXCISION, LYMPH NODE(S) Right 9/15/2016    Procedure: AXILLARY LYMPH NODE BIOPSY WITH FLOW CYTOMETRY;  Surgeon: Anastacio Heard MD;  [facility];  Service: General    TRUNK SKIN LESION EXCISIONAL BIOPSY      Benign 2 1-3 cm    UPPER ENDOSCOPY W/ ESOPHAGEAL MANOMETRY        Family History:     Family History   Problem Relation Age of Onset    Hypertension Mother     Diabetes Father     Prostate cancer Father       Social History:     Social History     Socioeconomic History    Marital status: /Civil Union     Spouse name: None    Number of children: None    Years of education: None    Highest education level: None   Occupational History    None   Tobacco Use    Smoking status: Never Smoker    Smokeless tobacco: Never Used   Substance and Sexual Activity    Alcohol use: No     Comment: socially per AS    Drug use: No    Sexual activity: None   Other Topics Concern    None   Social History Narrative    Daily coffe consumption    Denied hx of daily cola or tea consumption     Social Determinants of Health     Financial Resource Strain:     Difficulty of Paying Living Expenses:    Food Insecurity:     Worried About Running Out of Food in the Last Year:     Ran Out of Food in the Last Year:    Transportation Needs:     Lack of Transportation (Medical):  Lack of Transportation (Non-Medical):    Physical Activity:     Days of Exercise per Week:     Minutes of Exercise per Session:    Stress:     Feeling of Stress :    Social Connections:     Frequency of Communication with Friends and Family:     Frequency of Social Gatherings with Friends and Family:     Attends Rastafari Services:     Active Member of Clubs or Organizations:     Attends Club or Organization Meetings:     Marital Status:    Intimate Partner Violence:     Fear of Current or Ex-Partner:     Emotionally Abused:     Physically Abused:     Sexually Abused:       Current Medications:     Current Outpatient Medications   Medication Sig Dispense Refill    atorvastatin (LIPITOR) 10 mg tablet TAKE 1 TABLET DAILY 90 tablet 3    FreeStyle Unistick II Lancets MISC Use daily Patient tests blood sugars once daily 90 each 3    glucose blood (FREESTYLE TEST STRIPS) test strip Patient tests blood sugars once daily 100 each 3    glucose monitoring kit (FREESTYLE) monitoring kit Use 1 each daily 1 each 1    indomethacin (INDOCIN) 50 mg capsule TAKE 1 CAPSULE EVERY 8 HOURS AS NEEDED 270 capsule 4    Jardiance 25 MG TABS TAKE 1 TABLET EVERY MORNING 90 tablet 3    sildenafil (VIAGRA) 50 MG tablet TAKE 1 TABLET DAILY AS NEEDED FOR ERECTILE DYSFUNCTION 30 tablet 3     No current facility-administered medications for this visit        Allergies:     No Known Allergies   Physical Exam:     /80   Pulse 76   Temp 98 1 °F (36 7 °C)   Ht 5' 7" (1 702 m)   Wt 99 8 kg (220 lb)   BMI 34 46 kg/m²     Physical Exam  Vitals and nursing note reviewed  Constitutional:       General: He is not in acute distress  Appearance: Normal appearance  He is well-developed  He is obese  HENT:      Head: Normocephalic and atraumatic  Right Ear: Tympanic membrane, ear canal and external ear normal       Left Ear: Tympanic membrane, ear canal and external ear normal       Nose: Nose normal       Mouth/Throat:      Mouth: Mucous membranes are moist       Pharynx: No oropharyngeal exudate  Eyes:      Extraocular Movements: Extraocular movements intact  Conjunctiva/sclera: Conjunctivae normal       Pupils: Pupils are equal, round, and reactive to light  Neck:      Trachea: No tracheal deviation  Cardiovascular:      Rate and Rhythm: Normal rate and regular rhythm  Pulses: Normal pulses  Heart sounds: Normal heart sounds  No murmur heard  Pulmonary:      Effort: Pulmonary effort is normal  No respiratory distress  Breath sounds: Normal breath sounds  No wheezing, rhonchi or rales  Chest:      Chest wall: No swelling, crepitus or edema  Abdominal:      General: Bowel sounds are normal  There is no distension  Palpations: Abdomen is soft  There is no mass  Tenderness: There is no abdominal tenderness  There is no guarding  Musculoskeletal:      Right lower leg: No edema  Left lower leg: No edema  Lymphadenopathy:      Cervical: No cervical adenopathy  Skin:     General: Skin is warm and dry  Capillary Refill: Capillary refill takes less than 2 seconds  Findings: No erythema  Neurological:      General: No focal deficit present  Mental Status: He is alert and oriented to person, place, and time  Cranial Nerves: No cranial nerve deficit        Deep Tendon Reflexes: Reflexes normal    Psychiatric: Mood and Affect: Mood normal           MD Wali MartinoHoly Cross Hospital

## 2021-07-02 NOTE — PROGRESS NOTES
Lauren Zapien Danny 1975 male MRN: [id number]    Family Medicine Follow-up Visit    Assessment/Plan   Type 2 diabetes mellitus ([facility] [address] )    Lab Results   Component Value Date    HGBA1C 6 5 (H) 06/04/2021   Chronic, improved  Patient will trial stopping metformin, with the understanding that if his A1c>7 on repeat he may need to restart it  Continue strict dietary adherence  Repeat A1c in 3 months and full labs in 6 months  Continue Jardiance 25mg QD    We reviewed his labs he recently completed and all questions answered  Repeat A1c 3 months  Full labs in 6 months with PCP F/u appt  Prashant Machado was seen today for follow-up and physical exam     Diagnoses and all orders for this visit:    Annual physical exam    Type 2 diabetes mellitus without complication, without long-term current use of insulin ([facility] )  -     Hemoglobin A1C; Future  -     Lipid Panel with Direct LDL reflex; Future  -     Hemoglobin A1C; Future  -     Comprehensive metabolic panel; Future  -     CBC; Future  -     Microalbumin / creatinine urine ratio; Future  -     UA (URINE) with reflex to Scope; Future  -     TSH, 3rd generation with Free T4 reflex; Future    Gout, unspecified cause, unspecified chronicity, unspecified site  -     Uric acid; Future    BMI 34 0-34 9,adult      Real Rayo MD  [address]  7/2/2021      Please be aware that this note contains text that was dictated and there may be errors pertaining to "sound-alike" words during the dictation process  SUBJECTIVE    CC: Follow-up and Physical Exam    HPI:  Hollie Brasher is a 55 y o  male who presented for a follow-up of diabetes  He's been working hard on his diet because he wants to try and stop one of his medications, if possible  He's lost 9lbs since his last visit 6 months  He's about due for his annual diabetic eye exam    ED - well managed on the sildenafil  No bowel or bladder concerns  Gout - no flares       Review of Systems Constitutional: Negative for activity change, chills and fever  HENT: Negative for congestion, rhinorrhea and sore throat  Eyes: Negative for visual disturbance  Respiratory: Negative for cough, shortness of breath and wheezing  Cardiovascular: Negative for chest pain and palpitations  Gastrointestinal: Negative for abdominal pain, blood in stool, constipation, diarrhea, nausea and vomiting  Genitourinary: Negative for dysuria  Musculoskeletal: Negative for arthralgias and myalgias  Skin: Negative for rash  Neurological: Negative for dizziness, weakness and headaches  All other systems reviewed and are negative      Historical Information     The following portions of the patient's history were reviewed and updated as appropriate: allergies, current medications, past medical history, past social history and problem list     Medications:   Meds/Allergies     Current Outpatient Medications:     atorvastatin (LIPITOR) 10 mg tablet, TAKE 1 TABLET DAILY, Disp: 90 tablet, Rfl: 3    FreeStyle Unistick II Lancets MISC, Use daily Patient tests blood sugars once daily, Disp: 90 each, Rfl: 3    glucose blood (FREESTYLE TEST STRIPS) test strip, Patient tests blood sugars once daily, Disp: 100 each, Rfl: 3    glucose monitoring kit (FREESTYLE) monitoring kit, Use 1 each daily, Disp: 1 each, Rfl: 1    indomethacin (INDOCIN) 50 mg capsule, TAKE 1 CAPSULE EVERY 8 HOURS AS NEEDED, Disp: 270 capsule, Rfl: 4    Jardiance 25 MG TABS, TAKE 1 TABLET EVERY MORNING, Disp: 90 tablet, Rfl: 3    sildenafil (VIAGRA) 50 MG tablet, TAKE 1 TABLET DAILY AS NEEDED FOR ERECTILE DYSFUNCTION, Disp: 30 tablet, Rfl: 3  No Known Allergies    OBJECTIVE    Vitals:   Vitals:    07/02/21 0816   BP: 120/80   Pulse: 76   Temp: 98 1 °F (36 7 °C)   Weight: 99 8 kg (220 lb)   Height: 5' 7" (1 702 m)     Wt Readings from Last 3 Encounters:   07/02/21 99 8 kg (220 lb)   12/14/20 104 kg (229 lb)   05/27/20 96 2 kg (212 lb)     Body mass index is 34 46 kg/m²  BP Readings from Last 3 Encounters:   07/02/21 120/80   12/14/20 116/72   05/27/20 120/80     Pulse Readings from Last 3 Encounters:   07/02/21 76   12/14/20 76   05/27/20 74     No LMP for male patient  Physical Exam:    Physical Exam  Vitals and nursing note reviewed  Constitutional:       General: He is not in acute distress  Appearance: He is well-developed  He is not ill-appearing, toxic-appearing or diaphoretic  HENT:      Head: Normocephalic and atraumatic  Right Ear: Tympanic membrane, ear canal and external ear normal       Left Ear: Tympanic membrane, ear canal and external ear normal       Nose: Nose normal       Mouth/Throat:      Pharynx: Uvula midline  Tonsils: No tonsillar exudate  Eyes:      Conjunctiva/sclera: Conjunctivae normal       Pupils: Pupils are equal, round, and reactive to light  Neck:      Thyroid: No thyromegaly  Cardiovascular:      Rate and Rhythm: Normal rate and regular rhythm  Heart sounds: Normal heart sounds  No murmur heard  Pulmonary:      Effort: Pulmonary effort is normal  No respiratory distress  Breath sounds: Normal breath sounds  No decreased breath sounds, wheezing, rhonchi or rales  Abdominal:      General: Bowel sounds are normal  There is no distension  Palpations: Abdomen is soft  Abdomen is not rigid  Tenderness: There is no abdominal tenderness  There is no guarding or rebound  Musculoskeletal:      Cervical back: Normal range of motion and neck supple  Lymphadenopathy:      Cervical: No cervical adenopathy  Skin:     General: Skin is warm and dry  Neurological:      Mental Status: He is alert and oriented to person, place, and time  Cranial Nerves: No cranial nerve deficit  Sensory: No sensory deficit  Motor: No abnormal muscle tone        Deep Tendon Reflexes: Reflexes normal       Reflex Scores:       Patellar reflexes are 2+ on the right side and 2+ on the left side          Labs: I have personally reviewed all pertinent results  Imaging:  I have personally reviewed all pertinent results

## 2021-07-02 NOTE — PATIENT INSTRUCTIONS

## 2022-01-05 ENCOUNTER — TELEPHONE (OUTPATIENT)
Dept: FAMILY MEDICINE CLINIC | Facility: CLINIC | Age: 47
End: 2022-01-05

## 2022-01-05 NOTE — TELEPHONE ENCOUNTER
Pt called stating he took a home covid test which came up positive  He's taken 2 prior from when his symptoms started 1 1/2 weeks ago and they were negative  Pt states he has a runny nose, sore throat, dry cough, and previous chest congestion  Denies, fever, ear pain, n/v/d, headache and SOB  Pt is vaccinated, has not gotten booster yesterday

## 2022-01-05 NOTE — TELEPHONE ENCOUNTER
Let pt know that we consider (+) home tests to be "true"   Day one would be the day after his positive test  He could return to work at day 5 if his symptoms resolve, but he would need to wear a mask for at least 5d around other people

## 2022-02-08 ENCOUNTER — APPOINTMENT (OUTPATIENT)
Dept: LAB | Facility: CLINIC | Age: 47
End: 2022-02-08
Payer: COMMERCIAL

## 2022-02-08 DIAGNOSIS — M10.9 GOUT, UNSPECIFIED CAUSE, UNSPECIFIED CHRONICITY, UNSPECIFIED SITE: ICD-10-CM

## 2022-02-08 DIAGNOSIS — E11.9 TYPE 2 DIABETES MELLITUS WITHOUT COMPLICATION, WITHOUT LONG-TERM CURRENT USE OF INSULIN (HCC): ICD-10-CM

## 2022-02-08 LAB
ALBUMIN SERPL BCP-MCNC: 4.1 G/DL (ref 3.5–5)
ALP SERPL-CCNC: 54 U/L (ref 46–116)
ALT SERPL W P-5'-P-CCNC: 39 U/L (ref 12–78)
ANION GAP SERPL CALCULATED.3IONS-SCNC: 4 MMOL/L (ref 4–13)
AST SERPL W P-5'-P-CCNC: 19 U/L (ref 5–45)
BACTERIA UR QL AUTO: NORMAL /HPF
BILIRUB SERPL-MCNC: 0.98 MG/DL (ref 0.2–1)
BILIRUB UR QL STRIP: NEGATIVE
BUN SERPL-MCNC: 22 MG/DL (ref 5–25)
CALCIUM SERPL-MCNC: 9.7 MG/DL (ref 8.3–10.1)
CHLORIDE SERPL-SCNC: 104 MMOL/L (ref 100–108)
CHOLEST SERPL-MCNC: 171 MG/DL
CLARITY UR: CLEAR
CO2 SERPL-SCNC: 31 MMOL/L (ref 21–32)
COLOR UR: YELLOW
CREAT SERPL-MCNC: 0.9 MG/DL (ref 0.6–1.3)
CREAT UR-MCNC: 132 MG/DL
ERYTHROCYTE [DISTWIDTH] IN BLOOD BY AUTOMATED COUNT: 13 % (ref 11.6–15.1)
GFR SERPL CREATININE-BSD FRML MDRD: 102 ML/MIN/1.73SQ M
GLUCOSE P FAST SERPL-MCNC: 146 MG/DL (ref 65–99)
GLUCOSE UR STRIP-MCNC: ABNORMAL MG/DL
HCT VFR BLD AUTO: 48.1 % (ref 36.5–49.3)
HDLC SERPL-MCNC: 43 MG/DL
HGB BLD-MCNC: 16.2 G/DL (ref 12–17)
HGB UR QL STRIP.AUTO: NEGATIVE
HYALINE CASTS #/AREA URNS LPF: NORMAL /LPF
KETONES UR STRIP-MCNC: NEGATIVE MG/DL
LDLC SERPL CALC-MCNC: 99 MG/DL (ref 0–100)
LEUKOCYTE ESTERASE UR QL STRIP: ABNORMAL
MCH RBC QN AUTO: 28.9 PG (ref 26.8–34.3)
MCHC RBC AUTO-ENTMCNC: 33.7 G/DL (ref 31.4–37.4)
MCV RBC AUTO: 86 FL (ref 82–98)
MICROALBUMIN UR-MCNC: 10.5 MG/L (ref 0–20)
MICROALBUMIN/CREAT 24H UR: 8 MG/G CREATININE (ref 0–30)
NITRITE UR QL STRIP: NEGATIVE
NON-SQ EPI CELLS URNS QL MICRO: NORMAL /HPF
PH UR STRIP.AUTO: 6 [PH]
PLATELET # BLD AUTO: 205 THOUSANDS/UL (ref 149–390)
PMV BLD AUTO: 10.2 FL (ref 8.9–12.7)
POTASSIUM SERPL-SCNC: 4.4 MMOL/L (ref 3.5–5.3)
PROT SERPL-MCNC: 7.3 G/DL (ref 6.4–8.2)
PROT UR STRIP-MCNC: NEGATIVE MG/DL
RBC # BLD AUTO: 5.61 MILLION/UL (ref 3.88–5.62)
RBC #/AREA URNS AUTO: NORMAL /HPF
SODIUM SERPL-SCNC: 139 MMOL/L (ref 136–145)
SP GR UR STRIP.AUTO: 1.02 (ref 1–1.03)
TRIGL SERPL-MCNC: 143 MG/DL
TSH SERPL DL<=0.05 MIU/L-ACNC: 2.04 UIU/ML (ref 0.36–3.74)
URATE SERPL-MCNC: 5.5 MG/DL (ref 4.2–8)
UROBILINOGEN UR QL STRIP.AUTO: 0.2 E.U./DL
WBC # BLD AUTO: 5.89 THOUSAND/UL (ref 4.31–10.16)
WBC #/AREA URNS AUTO: NORMAL /HPF

## 2022-02-08 PROCEDURE — 82043 UR ALBUMIN QUANTITATIVE: CPT

## 2022-02-08 PROCEDURE — 80053 COMPREHEN METABOLIC PANEL: CPT

## 2022-02-08 PROCEDURE — 80061 LIPID PANEL: CPT

## 2022-02-08 PROCEDURE — 81001 URINALYSIS AUTO W/SCOPE: CPT

## 2022-02-08 PROCEDURE — 84443 ASSAY THYROID STIM HORMONE: CPT

## 2022-02-08 PROCEDURE — 36415 COLL VENOUS BLD VENIPUNCTURE: CPT

## 2022-02-08 PROCEDURE — 3061F NEG MICROALBUMINURIA REV: CPT | Performed by: FAMILY MEDICINE

## 2022-02-08 PROCEDURE — 82570 ASSAY OF URINE CREATININE: CPT

## 2022-02-08 PROCEDURE — 83036 HEMOGLOBIN GLYCOSYLATED A1C: CPT

## 2022-02-08 PROCEDURE — 84550 ASSAY OF BLOOD/URIC ACID: CPT

## 2022-02-08 PROCEDURE — 85027 COMPLETE CBC AUTOMATED: CPT

## 2022-02-09 LAB
EST. AVERAGE GLUCOSE BLD GHB EST-MCNC: 212 MG/DL
HBA1C MFR BLD: 9 %

## 2022-02-09 PROCEDURE — 3052F HG A1C>EQUAL 8.0%<EQUAL 9.0%: CPT | Performed by: FAMILY MEDICINE

## 2022-02-17 ENCOUNTER — OFFICE VISIT (OUTPATIENT)
Dept: FAMILY MEDICINE CLINIC | Facility: CLINIC | Age: 47
End: 2022-02-17
Payer: COMMERCIAL

## 2022-02-17 VITALS
HEART RATE: 74 BPM | TEMPERATURE: 98.9 F | BODY MASS INDEX: 34.69 KG/M2 | WEIGHT: 221 LBS | HEIGHT: 67 IN | SYSTOLIC BLOOD PRESSURE: 120 MMHG | DIASTOLIC BLOOD PRESSURE: 82 MMHG

## 2022-02-17 DIAGNOSIS — E11.9 TYPE 2 DIABETES MELLITUS WITHOUT COMPLICATION, WITHOUT LONG-TERM CURRENT USE OF INSULIN (HCC): Primary | ICD-10-CM

## 2022-02-17 DIAGNOSIS — E78.2 MIXED HYPERLIPIDEMIA: ICD-10-CM

## 2022-02-17 DIAGNOSIS — M10.9 GOUT, UNSPECIFIED CAUSE, UNSPECIFIED CHRONICITY, UNSPECIFIED SITE: ICD-10-CM

## 2022-02-17 DIAGNOSIS — D49.2 NEOPLASM OF SCALP: ICD-10-CM

## 2022-02-17 PROCEDURE — 99214 OFFICE O/P EST MOD 30 MIN: CPT | Performed by: FAMILY MEDICINE

## 2022-02-17 PROCEDURE — 1036F TOBACCO NON-USER: CPT | Performed by: FAMILY MEDICINE

## 2022-02-17 PROCEDURE — 3008F BODY MASS INDEX DOCD: CPT | Performed by: FAMILY MEDICINE

## 2022-02-17 NOTE — ASSESSMENT & PLAN NOTE
Lab Results   Component Value Date    HGBA1C 9 0 (H) 02/08/2022   Poor control  Unclear if this is due to decreased exercise, poor tired or both during his COVID illness  Patient has restarted his exercise regimen and is monitoring his diet  Recheck BMP and A1c in 3 months  Adjust medications if still elevated

## 2022-02-17 NOTE — PROGRESS NOTES
Assessment/Plan:    Type [facility] ([facility]    Lab Results   Component Value Date    HGBA1C 9 0 (H) 02/08/2022   Poor control  Unclear if this is due to decreased exercise, poor tired or both during his COVID illness  Patient has restarted his exercise regimen and is monitoring his diet  Recheck BMP and A1c in 3 months  Adjust medications if still elevated  Gout  Recent exacerbation has resolved  Continue to monitor for now  Recheck p r n  Hyperlipidemia  At goal   Continue atorvastatin  Monitor diet  Recheck 6 months       Diagnoses and all orders for this visit:    Type 2 diabetes mellitus without complication, without long-term current use of insulin (HCC)  -     Cancel: Basic metabolic panel; Future  -     Cancel: Hemoglobin A1C; Future  -     Hemoglobin A1C; Future  -     Basic metabolic panel; Future    Gout, unspecified cause, unspecified chronicity, unspecified site    Mixed hyperlipidemia    Neoplasm of scalp  -     Ambulatory referral to Dermatology; Future          Subjective:      Patient ID: Penny Grajeda is a 55 y o  male  f/u multiple med issues  - pt contracted COVID around [place]  Pt was vaccinated without the booster  Was fatigued initially but now is back to baseline  - pt hurt his back late last year and had not been exercising for around 4m  Pt thought that he was doing well with his diet, but A1C increased to 9 0 with a fasting BG of 146  Before COVID, his BG was around 110 fasting, but after the disease, her has been around 150  Pt compliant with Jardiance  - pt with one episode of gout over the last 6m  No joint issues now  - pt just restarted exercising   He denies CP, palpitations, lightheadedness or other CV symptoms with or without exertion  - no new GI or  complaints      The following portions of the patient's history were reviewed and updated as appropriate:   He  has a past medical history of Diabetes mellitus ([facility] ) and GERD (gastroesophageal reflux disease)  He   Patient Active Problem List    Diagnosis Date Noted    Arthralgia of right knee 11/30/2015    GERD without esophagitis 11/14/2014    Hyperlipidemia 01/15/2014    Type 2 diabetes mellitus ([facility] [address] ) 01/14/2014    Gout 12/04/2013     He  has a past surgical history that includes Upper endoscopy w/ esophageal manometry; pr biopsy/excision, lymph node(s) (Right, 9/15/2016); Chest wall biopsy (N/A, 9/15/2016); and Trunk skin lesion excisional biopsy  He  reports that he has never smoked  He has never used smokeless tobacco  He reports that he does not drink alcohol and does not use drugs  Current Outpatient Medications   Medication Sig Dispense Refill    atorvastatin (LIPITOR) 10 mg tablet TAKE 1 TABLET DAILY 90 tablet 3    FreeStyle Unistick II Lancets MISC Use daily Patient tests blood sugars once daily 90 each 3    glucose blood (FREESTYLE TEST STRIPS) test strip Patient tests blood sugars once daily 100 each 3    glucose monitoring kit (FREESTYLE) monitoring kit Use 1 each daily 1 each 1    indomethacin (INDOCIN) 50 mg capsule TAKE 1 CAPSULE EVERY 8 HOURS AS NEEDED 270 capsule 4    Jardiance 25 MG TABS TAKE 1 TABLET EVERY MORNING 90 tablet 3    sildenafil (VIAGRA) 50 MG tablet TAKE 1 TABLET DAILY AS NEEDED FOR ERECTILE DYSFUNCTION 30 tablet 3     No current facility-administered medications for this visit  He has No Known Allergies       Review of Systems   Constitutional: Negative  HENT: Negative  Eyes: Negative  Respiratory: Negative  Cardiovascular: Negative  Gastrointestinal: Negative  Endocrine: Negative  Genitourinary: Negative  Musculoskeletal: Negative  Skin: Negative  R scalp neoplasm   Allergic/Immunologic: Negative  Neurological: Negative  Hematological: Negative  Psychiatric/Behavioral: Negative            Objective:      /82   Pulse 74   Temp 98 9 °F (37 2 °C)   Ht 5' 7" (1 702 m)   Wt 100 kg (221 lb)   BMI 34 61 kg/m² Physical Exam  Constitutional:       Appearance: He is well-developed  HENT:      Head: Normocephalic and atraumatic  Right Ear: Tympanic membrane, ear canal and external ear normal       Left Ear: Tympanic membrane, ear canal and external ear normal    Eyes:      General: No scleral icterus  Extraocular Movements: Extraocular movements intact  Conjunctiva/sclera: Conjunctivae normal       Pupils: Pupils are equal, round, and reactive to light  Neck:      Thyroid: No thyromegaly  Vascular: No carotid bruit  Cardiovascular:      Rate and Rhythm: Normal rate and regular rhythm  Pulses: no weak pulses          Dorsalis pedis pulses are 2+ on the right side and 2+ on the left side  Heart sounds: Normal heart sounds  No murmur heard  Pulmonary:      Effort: Pulmonary effort is normal       Breath sounds: Normal breath sounds  Abdominal:      General: Abdomen is flat  Bowel sounds are normal  There is no distension  Palpations: Abdomen is soft  There is no mass  Tenderness: There is no abdominal tenderness  Musculoskeletal:         General: No swelling, tenderness or deformity  Normal range of motion  Cervical back: Normal range of motion and neck supple  No muscular tenderness  Right lower leg: No edema  Left lower leg: No edema  Feet:      Right foot:      Skin integrity: No ulcer, skin breakdown, erythema, warmth, callus or dry skin  Left foot:      Skin integrity: No ulcer, skin breakdown, erythema, warmth, callus or dry skin  Lymphadenopathy:      Cervical: No cervical adenopathy  Skin:     General: Skin is warm and dry  Capillary Refill: Capillary refill takes less than 2 seconds  Findings: Lesion (R scalp with raised SK like lesion with ?hyperheratotic area) present  Neurological:      Mental Status: He is alert and oriented to person, place, and time  Cranial Nerves: No cranial nerve deficit        Sensory: No sensory deficit  Motor: No weakness  Gait: Gait normal    Psychiatric:         Mood and Affect: Mood normal          Behavior: Behavior normal          Thought Content: Thought content normal          Judgment: Judgment normal          Patient's shoes and socks removed  Right Foot/Ankle   Right Foot Inspection  Skin Exam: skin normal and skin intact  No dry skin, no warmth, no callus, no erythema, no maceration, no abnormal color, no pre-ulcer, no ulcer and no callus  Toe Exam: ROM and strength within normal limits  Sensory   Vibration: intact  Monofilament testing: intact    Vascular  Capillary refills: < 3 seconds  The right DP pulse is 2+  Left Foot/Ankle  Left Foot Inspection  Skin Exam: skin normal and skin intact  No dry skin, no warmth, no erythema, no maceration, normal color, no pre-ulcer, no ulcer and no callus  Toe Exam: ROM and strength within normal limits  Sensory   Vibration: intact  Monofilament testing: intact    Vascular  Capillary refills: < 3 seconds  The left DP pulse is 2+       Assign Risk Category  No deformity present  No loss of protective sensation  No weak pulses  Risk: 0

## 2022-03-03 DIAGNOSIS — E11.9 TYPE 2 DIABETES MELLITUS WITHOUT COMPLICATION, WITHOUT LONG-TERM CURRENT USE OF INSULIN (HCC): ICD-10-CM

## 2022-03-04 RX ORDER — BLOOD SUGAR DIAGNOSTIC
STRIP MISCELLANEOUS
Qty: 100 STRIP | Refills: 3 | Status: SHIPPED | OUTPATIENT
Start: 2022-03-04

## 2022-03-04 RX ORDER — LANCETS 28 GAUGE
EACH MISCELLANEOUS
Qty: 100 EACH | Refills: 3 | Status: SHIPPED | OUTPATIENT
Start: 2022-03-04

## 2022-03-15 DIAGNOSIS — N52.9 ERECTILE DYSFUNCTION, UNSPECIFIED ERECTILE DYSFUNCTION TYPE: ICD-10-CM

## 2022-03-15 RX ORDER — SILDENAFIL 50 MG/1
TABLET, FILM COATED ORAL
Qty: 30 TABLET | Refills: 3 | Status: SHIPPED | OUTPATIENT
Start: 2022-03-15

## 2022-04-13 DIAGNOSIS — M10.9 GOUT, UNSPECIFIED CAUSE, UNSPECIFIED CHRONICITY, UNSPECIFIED SITE: ICD-10-CM

## 2022-04-13 RX ORDER — INDOMETHACIN 50 MG/1
50 CAPSULE ORAL EVERY 8 HOURS PRN
Qty: 270 CAPSULE | Refills: 0 | Status: SHIPPED | OUTPATIENT
Start: 2022-04-13

## 2022-05-12 ENCOUNTER — APPOINTMENT (OUTPATIENT)
Dept: LAB | Facility: CLINIC | Age: 47
End: 2022-05-12
Payer: COMMERCIAL

## 2022-05-12 DIAGNOSIS — E11.9 TYPE 2 DIABETES MELLITUS WITHOUT COMPLICATION, WITHOUT LONG-TERM CURRENT USE OF INSULIN (HCC): ICD-10-CM

## 2022-05-12 LAB
ANION GAP SERPL CALCULATED.3IONS-SCNC: 5 MMOL/L (ref 4–13)
BUN SERPL-MCNC: 19 MG/DL (ref 5–25)
CALCIUM SERPL-MCNC: 9.5 MG/DL (ref 8.3–10.1)
CHLORIDE SERPL-SCNC: 106 MMOL/L (ref 100–108)
CO2 SERPL-SCNC: 29 MMOL/L (ref 21–32)
CREAT SERPL-MCNC: 1.19 MG/DL (ref 0.6–1.3)
EST. AVERAGE GLUCOSE BLD GHB EST-MCNC: 171 MG/DL
GFR SERPL CREATININE-BSD FRML MDRD: 72 ML/MIN/1.73SQ M
GLUCOSE P FAST SERPL-MCNC: 134 MG/DL (ref 65–99)
HBA1C MFR BLD: 7.6 %
POTASSIUM SERPL-SCNC: 4.5 MMOL/L (ref 3.5–5.3)
SODIUM SERPL-SCNC: 140 MMOL/L (ref 136–145)

## 2022-05-12 PROCEDURE — 83036 HEMOGLOBIN GLYCOSYLATED A1C: CPT

## 2022-05-12 PROCEDURE — 80048 BASIC METABOLIC PNL TOTAL CA: CPT

## 2022-05-12 PROCEDURE — 3051F HG A1C>EQUAL 7.0%<8.0%: CPT | Performed by: FAMILY MEDICINE

## 2022-05-12 PROCEDURE — 36415 COLL VENOUS BLD VENIPUNCTURE: CPT

## 2022-05-27 ENCOUNTER — OFFICE VISIT (OUTPATIENT)
Dept: FAMILY MEDICINE CLINIC | Facility: CLINIC | Age: 47
End: 2022-05-27
Payer: COMMERCIAL

## 2022-05-27 VITALS
BODY MASS INDEX: 34.53 KG/M2 | SYSTOLIC BLOOD PRESSURE: 120 MMHG | WEIGHT: 220 LBS | HEIGHT: 67 IN | HEART RATE: 76 BPM | DIASTOLIC BLOOD PRESSURE: 80 MMHG | TEMPERATURE: 98.6 F

## 2022-05-27 DIAGNOSIS — E78.2 MIXED HYPERLIPIDEMIA: ICD-10-CM

## 2022-05-27 DIAGNOSIS — Z12.11 SCREENING FOR COLON CANCER: ICD-10-CM

## 2022-05-27 DIAGNOSIS — E11.9 TYPE 2 DIABETES MELLITUS WITHOUT COMPLICATION, WITHOUT LONG-TERM CURRENT USE OF INSULIN (HCC): Primary | ICD-10-CM

## 2022-05-27 DIAGNOSIS — K21.9 GERD WITHOUT ESOPHAGITIS: ICD-10-CM

## 2022-05-27 PROCEDURE — 99214 OFFICE O/P EST MOD 30 MIN: CPT | Performed by: FAMILY MEDICINE

## 2022-05-27 PROCEDURE — 1036F TOBACCO NON-USER: CPT | Performed by: FAMILY MEDICINE

## 2022-05-27 PROCEDURE — 3008F BODY MASS INDEX DOCD: CPT | Performed by: FAMILY MEDICINE

## 2022-05-27 NOTE — ASSESSMENT & PLAN NOTE
Lab Results   Component Value Date    HGBA1C 7 6 (H) 05/12/2022   A1c improved though still suboptimal   Continue Jardiance  Patient working on diet and exercise  We will have him recheck labs in 3 months and follow up in office 1 week later  Adjust medications if still elevated

## 2022-05-27 NOTE — PROGRESS NOTES
Assessment/Plan:    GERD without esophagitis  Patient appears to be doing well  Continue to monitor  Recheck 6 months    Type 2 [facility] mellitus ([facility]    Lab Results   Component Value Date    HGBA1C 7 6 (H) 05/12/2022   A1c improved though still suboptimal   Continue Jardiance  Patient working on diet and exercise  We will have him recheck labs in 3 months and follow up in office 1 week later  Adjust medications if still elevated  Hyperlipidemia  Check labs  Continue atorvastatin 10 mg a day  Adjust dose if not at goal   Recheck 6 months       Diagnoses and all orders for this visit:    Type 2 diabetes mellitus without complication, without long-term current use of insulin ([facility])  -     Comprehensive metabolic panel; Future  -     Hemoglobin A1C; Future  -     Lipid panel; Future  -     CBC and differential; Future    Mixed hyperlipidemia    GERD without esophagitis    Screening for colon cancer  -     Ambulatory referral for colonoscopy; Future        Subjective:      Patient ID: Darrian Okeefe is a 52 y o  male  f/u multiple med issues  - pt has been exercising more since last visit  Has not changed his diet however, A1C has improved from 9 0 ->7 6  Due for eye exam  - back has improved  Some discomfort with certain movements, but overall is doing well  - increased exercise as above  Denies CP, palpitations, lightheadedness or other CV symptoms with or without exertion  - no new GI or  complaints  Due for colonoscopy      The following portions of the patient's history were reviewed and updated as appropriate:   He  has a past medical history of Diabetes mellitus ([facility] ) and GERD (gastroesophageal reflux disease)    He   Patient Active Problem List    Diagnosis Date Noted    Arthralgia of right knee 11/30/2015    GERD without esophagitis 11/14/2014    Hyperlipidemia 01/15/2014    Type 2 diabetes mellitus ([facility] [address] ) 01/14/2014    Gout 12/04/2013     He  has a past surgical history that includes Upper endoscopy w/ esophageal manometry; pr biopsy/excision, lymph node(s) (Right, 9/15/2016); Chest wall biopsy (N/A, 9/15/2016); and Trunk skin lesion excisional biopsy  He  reports that he has never smoked  He has never used smokeless tobacco  He reports that he does not drink alcohol and does not use drugs  Current Outpatient Medications   Medication Sig Dispense Refill    atorvastatin (LIPITOR) 10 mg tablet TAKE 1 TABLET DAILY 90 tablet 3    glucose blood (FREESTYLE TEST STRIPS) test strip TEST BLOOD SUGARS ONCE DAILY 100 strip 3    glucose monitoring kit (FREESTYLE) monitoring kit Use 1 each daily 1 each 1    indomethacin (INDOCIN) 50 mg capsule Take 1 capsule (50 mg total) by mouth every 8 (eight) hours as needed for mild pain or moderate pain 270 capsule 0    Jardiance 25 MG TABS TAKE 1 TABLET EVERY MORNING 90 tablet 3    Lancets (freestyle) lancets USE ONCE A  each 3    sildenafil (VIAGRA) 50 MG tablet TAKE 1 TABLET DAILY AS NEEDED FOR ERECTILE DYSFUNCTION 30 tablet 3     No current facility-administered medications for this visit  He has No Known Allergies       Review of Systems   Constitutional: Negative  HENT: Negative  Eyes: Negative  Respiratory: Negative  Cardiovascular: Negative  Gastrointestinal: Negative  Endocrine: Negative  Genitourinary: Negative  Musculoskeletal: Positive for back pain (occasional)  Negative for arthralgias, gait problem and joint swelling  Skin: Negative  Allergic/Immunologic: Negative  Neurological: Negative  Hematological: Negative  Psychiatric/Behavioral: Negative  Objective:      /80   Pulse 76   Temp 98 6 °F (37 °C)   Ht 5' 7" (1 702 m)   Wt 99 8 kg (220 lb)   BMI 34 46 kg/m²          Physical Exam  Constitutional:       Appearance: He is well-developed  HENT:      Head: Normocephalic and atraumatic        Right Ear: Tympanic membrane, ear canal and external ear normal       Left Ear: Tympanic membrane, ear canal and external ear normal       Mouth/Throat:      Mouth: Mucous membranes are moist    Eyes:      General: No scleral icterus  Extraocular Movements: Extraocular movements intact  Conjunctiva/sclera: Conjunctivae normal       Pupils: Pupils are equal, round, and reactive to light  Neck:      Thyroid: No thyromegaly  Vascular: No carotid bruit  Cardiovascular:      Rate and Rhythm: Normal rate and regular rhythm  Pulses: Normal pulses  Heart sounds: Normal heart sounds  No murmur heard  Pulmonary:      Effort: Pulmonary effort is normal       Breath sounds: Normal breath sounds  Abdominal:      General: Bowel sounds are normal  There is no distension  Palpations: Abdomen is soft  There is no mass  Tenderness: There is no abdominal tenderness  Musculoskeletal:         General: No swelling, tenderness or deformity  Normal range of motion  Cervical back: Normal range of motion and neck supple  No muscular tenderness  Right lower leg: No edema  Left lower leg: No edema  Lymphadenopathy:      Cervical: No cervical adenopathy  Skin:     General: Skin is warm and dry  Capillary Refill: Capillary refill takes less than 2 seconds  Neurological:      Mental Status: He is alert and oriented to person, place, and time  Cranial Nerves: No cranial nerve deficit  Sensory: No sensory deficit  Motor: No weakness  Gait: Gait normal    Psychiatric:         Mood and Affect: Mood normal          Behavior: Behavior normal          Thought Content:  Thought content normal          Judgment: Judgment normal

## 2022-07-12 DIAGNOSIS — E78.2 MIXED HYPERLIPIDEMIA: ICD-10-CM

## 2022-07-12 RX ORDER — ATORVASTATIN CALCIUM 10 MG/1
TABLET, FILM COATED ORAL
Qty: 90 TABLET | Refills: 3 | Status: SHIPPED | OUTPATIENT
Start: 2022-07-12

## 2022-08-22 DIAGNOSIS — E11.9 TYPE 2 DIABETES MELLITUS WITHOUT COMPLICATION, WITHOUT LONG-TERM CURRENT USE OF INSULIN (HCC): ICD-10-CM

## 2022-08-22 RX ORDER — EMPAGLIFLOZIN 25 MG/1
TABLET, FILM COATED ORAL
Qty: 90 TABLET | Refills: 3 | Status: SHIPPED | OUTPATIENT
Start: 2022-08-22

## 2022-09-29 ENCOUNTER — TELEPHONE (OUTPATIENT)
Dept: GASTROENTEROLOGY | Facility: CLINIC | Age: 47
End: 2022-09-29

## 2022-09-29 ENCOUNTER — PREP FOR PROCEDURE (OUTPATIENT)
Dept: GASTROENTEROLOGY | Facility: CLINIC | Age: 47
End: 2022-09-29

## 2022-09-29 DIAGNOSIS — Z12.11 SCREENING FOR COLON CANCER: Primary | ICD-10-CM

## 2022-09-29 NOTE — TELEPHONE ENCOUNTER
[facility] Assessment    Name: Montserrat Pan  YOB: 1975  Last Height: 5' 7" (1 702 m)  Last weight: 99 8 kg (220 lb)  BMI: 34 46 kg/m²  Procedure: Colon  Diagnosis: screening  Date of procedure: 10/27/22  Prep: gatorade, miralax, dul  Responsible : yes  Phone#: [phone]  Name completing form: Michael Vivar  Date form completed: 09/29/22      If the patient answers yes to any of these questions, schedule in a hospital  Are you pregnant: No  Do you rely on a wheelchair for mobility: No  Have you been diagnosed with End Stage Renal Disease (ESRD): No  Do you need oxygen during the day: No  Have you had a heart attack or stroke within the past three months: No  Have you had a seizure within the past three months: No  Have you ever been informed by anesthesia that you have a difficult airway: No  Additional Questions  Have you had any cardiac testing or are under the care of a Cardiologist (see cardiac list): No  Cardiac list:   Do you have an implanted cardiac defibrillator: No (Comment:  This patient should be scheduled in the hospital)    Have any bleeding problems, such as anemia or hemophilia (If patient has H&H result below 8, schedule in hospital   H&H must be within 30 days of procedure): No    Had an organ transplant within the past 3 months: No    Do you have any present infections: No  Do you get short of breath when walking a few blocks: No  Have you been diagnosed with diabetes: Yes  Comments (provide cardiac provider information if applicable):

## 2022-10-21 ENCOUNTER — TELEPHONE (OUTPATIENT)
Dept: GASTROENTEROLOGY | Facility: CLINIC | Age: 47
End: 2022-10-21

## 2022-10-21 NOTE — TELEPHONE ENCOUNTER
Spoke to pt  confirming pt's colonoscopy scheduled on 10/27/22 at [facility] with Dr July Mary   Informed [facility] would be calling 1-2 days prior with the arrival time  Informed of clear liquid diet day prior as well as the bowel cleansing preparation  Informed pt would need a  the day of the procedure due to being under sedation  Pt has instructions and did not have any questions  Advised pt to contact insurance if has any questions regarding coverage of procedure

## 2022-10-26 RX ORDER — LIDOCAINE HYDROCHLORIDE 10 MG/ML
0.5 INJECTION, SOLUTION EPIDURAL; INFILTRATION; INTRACAUDAL; PERINEURAL ONCE AS NEEDED
Status: CANCELLED | OUTPATIENT
Start: 2022-10-26

## 2022-10-26 RX ORDER — SODIUM CHLORIDE, SODIUM LACTATE, POTASSIUM CHLORIDE, CALCIUM CHLORIDE 600; 310; 30; 20 MG/100ML; MG/100ML; MG/100ML; MG/100ML
125 INJECTION, SOLUTION INTRAVENOUS CONTINUOUS
Status: CANCELLED | OUTPATIENT
Start: 2022-10-26

## 2022-10-27 ENCOUNTER — HOSPITAL ENCOUNTER (OUTPATIENT)
Dept: GASTROENTEROLOGY | Facility: AMBULATORY SURGERY CENTER | Age: 47
Discharge: HOME | End: 2022-10-27

## 2022-10-27 ENCOUNTER — ANESTHESIA EVENT (OUTPATIENT)
Dept: GASTROENTEROLOGY | Facility: AMBULATORY SURGERY CENTER | Age: 47
End: 2022-10-27

## 2022-10-27 ENCOUNTER — ANESTHESIA (OUTPATIENT)
Dept: GASTROENTEROLOGY | Facility: AMBULATORY SURGERY CENTER | Age: 47
End: 2022-10-27

## 2022-10-27 VITALS
RESPIRATION RATE: 18 BRPM | HEART RATE: 75 BPM | SYSTOLIC BLOOD PRESSURE: 116 MMHG | BODY MASS INDEX: 33.74 KG/M2 | OXYGEN SATURATION: 97 % | TEMPERATURE: 97 F | HEIGHT: 67 IN | WEIGHT: 215 LBS | DIASTOLIC BLOOD PRESSURE: 74 MMHG

## 2022-10-27 DIAGNOSIS — Z12.11 SCREENING FOR COLON CANCER: ICD-10-CM

## 2022-10-27 RX ORDER — SODIUM CHLORIDE, SODIUM LACTATE, POTASSIUM CHLORIDE, CALCIUM CHLORIDE 600; 310; 30; 20 MG/100ML; MG/100ML; MG/100ML; MG/100ML
125 INJECTION, SOLUTION INTRAVENOUS CONTINUOUS
Status: DISCONTINUED | OUTPATIENT
Start: 2022-10-27 | End: 2022-10-31 | Stop reason: HOSPADM

## 2022-10-27 RX ORDER — LIDOCAINE HYDROCHLORIDE 20 MG/ML
INJECTION, SOLUTION EPIDURAL; INFILTRATION; INTRACAUDAL; PERINEURAL AS NEEDED
Status: DISCONTINUED | OUTPATIENT
Start: 2022-10-27 | End: 2022-10-27

## 2022-10-27 RX ORDER — PROPOFOL 10 MG/ML
INJECTION, EMULSION INTRAVENOUS AS NEEDED
Status: DISCONTINUED | OUTPATIENT
Start: 2022-10-27 | End: 2022-10-27

## 2022-10-27 RX ADMIN — PROPOFOL 150 MG: 10 INJECTION, EMULSION INTRAVENOUS at 09:07

## 2022-10-27 RX ADMIN — LIDOCAINE HYDROCHLORIDE 100 MG: 20 INJECTION, SOLUTION EPIDURAL; INFILTRATION; INTRACAUDAL; PERINEURAL at 09:05

## 2022-10-27 RX ADMIN — PROPOFOL 30 MG: 10 INJECTION, EMULSION INTRAVENOUS at 09:20

## 2022-10-27 RX ADMIN — PROPOFOL 50 MG: 10 INJECTION, EMULSION INTRAVENOUS at 09:11

## 2022-10-27 RX ADMIN — SODIUM CHLORIDE, SODIUM LACTATE, POTASSIUM CHLORIDE, CALCIUM CHLORIDE: 600; 310; 30; 20 INJECTION, SOLUTION INTRAVENOUS at 08:50

## 2022-10-27 RX ADMIN — PROPOFOL 50 MG: 10 INJECTION, EMULSION INTRAVENOUS at 09:16

## 2022-10-27 RX ADMIN — PROPOFOL 20 MG: 10 INJECTION, EMULSION INTRAVENOUS at 09:23

## 2022-10-27 NOTE — ANESTHESIA POSTPROCEDURE EVALUATION
Post-Op Assessment Note    CV Status:  Stable  Pain Score: 0    Pain management: adequate     Mental Status:  Awake   Hydration Status:  Stable   PONV Controlled:  Controlled   Airway Patency:  Patent      Post Op Vitals Reviewed: Yes      Staff: CRNA         No complications documented      BP   101/62   Temp     Pulse  73   Resp   12   SpO2   98

## 2022-10-27 NOTE — ANESTHESIA PREPROCEDURE EVALUATION
Procedure:  COLONOSCOPY    Relevant Problems   CARDIO   (+) Hyperlipidemia      ENDO   (+) Type 2 diabetes mellitus (HCC)      GI/HEPATIC   (+) GERD without esophagitis      MUSCULOSKELETAL   (+) Gout        Physical Exam    Airway    Mallampati score: II         Dental   No notable dental hx     Cardiovascular      Pulmonary      Other Findings        Anesthesia Plan  ASA Score- 2     Anesthesia Type- IV sedation with anesthesia with ASA Monitors  Additional Monitors:   Airway Plan:     Comment: I, Dr Daron Low, the attending physician, have personally seen and evaluated the patient prior to anesthetic care  I have reviewed the pre-anesthetic record, and other medical records if appropriate to the anesthetic care  If a CRNA is involved in the case, I have reviewed the CRNA assessment, if present, and agree  The patient is in a suitable condition to proceed with my formulated anesthetic plan          Plan Factors-    Chart reviewed  Induction- intravenous  Postoperative Plan-     Informed Consent- Anesthetic plan and risks discussed with patient  I personally reviewed this patient with the CRNA  Discussed and agreed on the Anesthesia Plan with the CRNA  Cyndee Mendoza

## 2022-10-27 NOTE — H&P
History and Physical - [facility]  Gary Delgado 52 y o  male MRN: [id number]                  HPI: iMcha Grant is a 52y o  year old male who presents for history of screening      REVIEW OF SYSTEMS: Per the HPI, and otherwise unremarkable      Historical Information   Past Medical History:   Diagnosis Date   • Diabetes mellitus ([facility] [address] )     elevated bloodsugars    • GERD (gastroesophageal reflux disease)    • Hyperlipidemia      Past Surgical History:   Procedure Laterality Date   • CHEST WALL BIOPSY N/A 09/15/2016    Procedure: CHEST LESION EXCISION ;  Surgeon: Marian Corona MD;  [facility];  Service:    • [place] BIOPSY/EXCISION, LYMPH NODE(S) Right 09/15/2016    Procedure: AXILLARY LYMPH NODE BIOPSY WITH FLOW CYTOMETRY;  Surgeon: Marian Corona MD;  [facility];  Service: General   • TRUNK SKIN LESION EXCISIONAL BIOPSY      Benign 2 1-3 cm   • UPPER ENDOSCOPY W/ ESOPHAGEAL MANOMETRY     • UPPER GASTROINTESTINAL ENDOSCOPY       Social History   Social History     Substance and Sexual Activity   Alcohol Use Yes    Comment: socially per AS     Social History     Substance and Sexual Activity   Drug Use No     Social History     Tobacco Use   Smoking Status Never Smoker   Smokeless Tobacco Never Used     Family History   Problem Relation Age of Onset   • Hypertension Mother    • Diabetes Father    • Prostate cancer Father        Meds/Allergies       Current Outpatient Medications:   •  atorvastatin (LIPITOR) 10 mg tablet  •  Jardiance 25 MG TABS  •  Multiple Vitamin (MULTIVITAMIN PO)  •  sildenafil (VIAGRA) 50 MG tablet  •  glucose blood (FREESTYLE TEST STRIPS) test strip  •  glucose monitoring kit (FREESTYLE) monitoring kit  •  indomethacin (INDOCIN) 50 mg capsule  •  Lancets (freestyle) lancets    Current Facility-Administered Medications:   •  lactated ringers infusion, 125 mL/hr, Intravenous, Continuous, New Bag at 10/27/22 0850    No Known Allergies    Objective     BP 103/66   Pulse 71   Temp (!) 97 °F (36 1 °C) (Skin)   Resp 18   Ht 5' 7" (1 702 m)   Wt 97 5 kg (215 lb)   SpO2 97%   BMI 33 67 kg/m²       PHYSICAL EXAM    Gen: NAD  Head: NCAT  CV: RRR  CHEST: Clear  ABD: soft, NT/ND  EXT: no edema      ASSESSMENT/PLAN:  This is a 52y o  year old male here for colonoscopy, and he is stable and optimized for his procedure

## 2022-12-08 ENCOUNTER — APPOINTMENT (OUTPATIENT)
Dept: LAB | Facility: CLINIC | Age: 47
End: 2022-12-08

## 2022-12-08 DIAGNOSIS — E11.9 TYPE 2 DIABETES MELLITUS WITHOUT COMPLICATION, WITHOUT LONG-TERM CURRENT USE OF INSULIN (HCC): ICD-10-CM

## 2022-12-08 LAB
ALBUMIN SERPL BCP-MCNC: 4 G/DL (ref 3.5–5)
ALP SERPL-CCNC: 59 U/L (ref 46–116)
ALT SERPL W P-5'-P-CCNC: 35 U/L (ref 12–78)
ANION GAP SERPL CALCULATED.3IONS-SCNC: 5 MMOL/L (ref 4–13)
AST SERPL W P-5'-P-CCNC: 16 U/L (ref 5–45)
BASOPHILS # BLD AUTO: 0.02 THOUSANDS/ÂΜL (ref 0–0.1)
BASOPHILS NFR BLD AUTO: 0 % (ref 0–1)
BILIRUB SERPL-MCNC: 0.52 MG/DL (ref 0.2–1)
BUN SERPL-MCNC: 23 MG/DL (ref 5–25)
CALCIUM SERPL-MCNC: 9.4 MG/DL (ref 8.3–10.1)
CHLORIDE SERPL-SCNC: 105 MMOL/L (ref 96–108)
CHOLEST SERPL-MCNC: 142 MG/DL
CO2 SERPL-SCNC: 28 MMOL/L (ref 21–32)
CREAT SERPL-MCNC: 0.97 MG/DL (ref 0.6–1.3)
EOSINOPHIL # BLD AUTO: 0.14 THOUSAND/ÂΜL (ref 0–0.61)
EOSINOPHIL NFR BLD AUTO: 3 % (ref 0–6)
ERYTHROCYTE [DISTWIDTH] IN BLOOD BY AUTOMATED COUNT: 12.1 % (ref 11.6–15.1)
GFR SERPL CREATININE-BSD FRML MDRD: 92 ML/MIN/1.73SQ M
GLUCOSE P FAST SERPL-MCNC: 169 MG/DL (ref 65–99)
HCT VFR BLD AUTO: 47.3 % (ref 36.5–49.3)
HDLC SERPL-MCNC: 41 MG/DL
HGB BLD-MCNC: 15.9 G/DL (ref 12–17)
IMM GRANULOCYTES # BLD AUTO: 0.02 THOUSAND/UL (ref 0–0.2)
IMM GRANULOCYTES NFR BLD AUTO: 0 % (ref 0–2)
LDLC SERPL CALC-MCNC: 81 MG/DL (ref 0–100)
LYMPHOCYTES # BLD AUTO: 2.05 THOUSANDS/ÂΜL (ref 0.6–4.47)
LYMPHOCYTES NFR BLD AUTO: 39 % (ref 14–44)
MCH RBC QN AUTO: 29 PG (ref 26.8–34.3)
MCHC RBC AUTO-ENTMCNC: 33.6 G/DL (ref 31.4–37.4)
MCV RBC AUTO: 86 FL (ref 82–98)
MONOCYTES # BLD AUTO: 0.39 THOUSAND/ÂΜL (ref 0.17–1.22)
MONOCYTES NFR BLD AUTO: 7 % (ref 4–12)
NEUTROPHILS # BLD AUTO: 2.66 THOUSANDS/ÂΜL (ref 1.85–7.62)
NEUTS SEG NFR BLD AUTO: 51 % (ref 43–75)
NONHDLC SERPL-MCNC: 101 MG/DL
NRBC BLD AUTO-RTO: 0 /100 WBCS
PLATELET # BLD AUTO: 215 THOUSANDS/UL (ref 149–390)
PMV BLD AUTO: 10.3 FL (ref 8.9–12.7)
POTASSIUM SERPL-SCNC: 4.5 MMOL/L (ref 3.5–5.3)
PROT SERPL-MCNC: 7.2 G/DL (ref 6.4–8.4)
RBC # BLD AUTO: 5.48 MILLION/UL (ref 3.88–5.62)
SODIUM SERPL-SCNC: 138 MMOL/L (ref 135–147)
TRIGL SERPL-MCNC: 100 MG/DL
WBC # BLD AUTO: 5.28 THOUSAND/UL (ref 4.31–10.16)

## 2022-12-10 LAB
EST. AVERAGE GLUCOSE BLD GHB EST-MCNC: 206 MG/DL
HBA1C MFR BLD: 8.8 %

## 2022-12-16 ENCOUNTER — OFFICE VISIT (OUTPATIENT)
Dept: FAMILY MEDICINE CLINIC | Facility: CLINIC | Age: 47
End: 2022-12-16

## 2022-12-16 VITALS
HEIGHT: 67 IN | DIASTOLIC BLOOD PRESSURE: 72 MMHG | OXYGEN SATURATION: 95 % | WEIGHT: 220.8 LBS | HEART RATE: 80 BPM | TEMPERATURE: 97.8 F | BODY MASS INDEX: 34.65 KG/M2 | SYSTOLIC BLOOD PRESSURE: 102 MMHG

## 2022-12-16 DIAGNOSIS — E78.2 MIXED HYPERLIPIDEMIA: ICD-10-CM

## 2022-12-16 DIAGNOSIS — E11.9 TYPE 2 DIABETES MELLITUS WITHOUT COMPLICATION, WITHOUT LONG-TERM CURRENT USE OF INSULIN (HCC): Primary | ICD-10-CM

## 2022-12-16 LAB
LEFT EYE DIABETIC RETINOPATHY: NORMAL
LEFT EYE IMAGE QUALITY: NORMAL
LEFT EYE MACULAR EDEMA: NORMAL
LEFT EYE OTHER RETINOPATHY: NORMAL
RIGHT EYE DIABETIC RETINOPATHY: NORMAL
RIGHT EYE IMAGE QUALITY: NORMAL
RIGHT EYE MACULAR EDEMA: NORMAL
RIGHT EYE OTHER RETINOPATHY: NORMAL
SEVERITY (EYE EXAM): NORMAL

## 2022-12-16 NOTE — PROGRESS NOTES
Name: Marisol Pham      : 1975      MRN: [id number]  Encounter Provider: Antonio Stacy MD  Encounter Date: 2022   Encounter department: [address] [place] [address]     1  Type 2 diabetes mellitus without complication, without long-term current use of insulin ([facility])  Assessment & Plan:    Lab Results   Component Value Date    HGBA1C 8 8 (H) 2022   I reviewed fully with pt  His A1C numbers have been very labile, with elevations always associated with periods of decreased activity and poor diet compliance  Pt is compliant with meds  He is restarting an exercise program, and is working on his diet  Will recheck pt in 3m  He should check BG once or twice a day in the interim    Orders:  -     IRIS Diabetic eye exam    2  Mixed hyperlipidemia  Assessment & Plan:  Check labs  Continue atorvastatin  Recheck 3m             Subjective     f/u multiple med issues  - pt has been feeling well  - "my diet has been awful" and "I haven't really been exercising"  Recent A1C =  8 8  Agrees to have eye exam today  - pt denies CP, palpitations, lightheadedness or other CV symptoms with or without exertion  - no new GI or  complaints  Had colonoscopy recently which was unremarkable  - pt denies any new musculoskeletal complaints    Review of Systems   Constitutional: Negative  HENT: Negative  Eyes: Negative  Respiratory: Negative  Cardiovascular: Negative  Gastrointestinal: Negative  Endocrine: Negative  Genitourinary: Negative  Musculoskeletal: Negative  Skin: Negative  Allergic/Immunologic: Negative  Neurological: Negative  Hematological: Negative  Psychiatric/Behavioral: Negative          Past Medical History:   Diagnosis Date   • Diabetes mellitus ([address] )     elevated bloodsugars    • GERD (gastroesophageal reflux disease)    • Hyperlipidemia      Past Surgical History:   Procedure Laterality Date   • CHEST WALL BIOPSY N/A 09/15/2016    Procedure: CHEST LESION EXCISION ;  Surgeon: Rahul Herrera MD;  [facility];  Service:    • [place] BIOPSY/EXCISION, LYMPH NODE(S) Right 09/15/2016    Procedure: AXILLARY LYMPH NODE BIOPSY WITH FLOW CYTOMETRY;  Surgeon: Rahul Herrera MD;  [facility];  Service: General   • TRUNK SKIN LESION EXCISIONAL BIOPSY      Benign 2 1-3 cm   • UPPER ENDOSCOPY W/ ESOPHAGEAL MANOMETRY     • UPPER GASTROINTESTINAL ENDOSCOPY       Family History   Problem Relation Age of Onset   • Hypertension Mother    • Diabetes Father    • Prostate cancer Father      Social History     Socioeconomic History   • Marital status: /Civil Union     Spouse name: None   • Number of children: None   • Years of education: None   • Highest education level: None   Occupational History   • None   Tobacco Use   • Smoking status: Never   • Smokeless tobacco: Never   Vaping Use   • Vaping Use: None   Substance and Sexual Activity   • Alcohol use: Yes     Comment: socially per AS   • Drug use: No   • Sexual activity: None   Other Topics Concern   • None   Social History Narrative    Daily coffe consumption    Denied hx of daily cola or tea consumption     Social Determinants of Health     Financial Resource Strain: Not on file   Food Insecurity: Not on file   Transportation Needs: Not on file   Physical Activity: Not on file   Stress: Not on file   Social Connections: Not on file   Intimate Partner Violence: Not on file   Housing Stability: Not on file     Current Outpatient Medications on File Prior to Visit   Medication Sig   • atorvastatin (LIPITOR) 10 mg tablet TAKE 1 TABLET DAILY   • glucose blood (FREESTYLE TEST STRIPS) test strip TEST BLOOD SUGARS ONCE DAILY   • glucose monitoring kit (FREESTYLE) monitoring kit Use 1 each daily   • indomethacin (INDOCIN) 50 mg capsule Take 1 capsule (50 mg total) by mouth every 8 (eight) hours as needed for mild pain or moderate pain   • Jardiance 25 MG TABS TAKE 1 TABLET EVERY MORNING • Lancets (freestyle) lancets USE ONCE A DAY   • Multiple Vitamin (MULTIVITAMIN PO) Take by mouth in the morning   • sildenafil (VIAGRA) 50 MG tablet TAKE 1 TABLET DAILY AS NEEDED FOR ERECTILE DYSFUNCTION     No Known Allergies  Immunization History   Administered Date(s) Administered   • COVID-19 MODERNA VACC 0 5 ML IM 01/15/2021, 02/12/2021   • INFLUENZA 12/10/2020   • Influenza, injectable, quadrivalent, preservative free 0 5 mL 12/10/2020   • Influenza, recombinant, quadrivalent,injectable, preservative free 11/18/2019       Objective     /72   Pulse 80   Temp 97 8 °F (36 6 °C)   Ht 5' 7" (1 702 m)   Wt 100 kg (220 lb 12 8 oz)   SpO2 95%   BMI 34 58 kg/m²     Physical Exam  Vitals reviewed  Constitutional:       Appearance: He is well-developed  HENT:      Head: Normocephalic and atraumatic  Right Ear: Tympanic membrane, ear canal and external ear normal       Left Ear: Tympanic membrane, ear canal and external ear normal       Mouth/Throat:      Mouth: Mucous membranes are moist    Eyes:      General: No scleral icterus  Extraocular Movements: Extraocular movements intact  Conjunctiva/sclera: Conjunctivae normal       Pupils: Pupils are equal, round, and reactive to light  Neck:      Thyroid: No thyromegaly  Cardiovascular:      Rate and Rhythm: Normal rate and regular rhythm  Pulses: Normal pulses  Heart sounds: Normal heart sounds  No murmur heard  Pulmonary:      Effort: Pulmonary effort is normal       Breath sounds: Normal breath sounds  Abdominal:      General: Bowel sounds are normal  There is no distension  Palpations: Abdomen is soft  There is no mass  Tenderness: There is no abdominal tenderness  Musculoskeletal:         General: No swelling, tenderness or deformity  Normal range of motion  Cervical back: Normal range of motion and neck supple  No tenderness  No muscular tenderness  Right lower leg: No edema        Left lower leg: No edema  Lymphadenopathy:      Cervical: No cervical adenopathy  Skin:     General: Skin is warm and dry  Capillary Refill: Capillary refill takes less than 2 seconds  Neurological:      Mental Status: He is alert and oriented to person, place, and time  Cranial Nerves: No cranial nerve deficit  Sensory: No sensory deficit  Motor: No weakness  Gait: Gait normal    Psychiatric:         Mood and Affect: Mood normal          Behavior: Behavior normal          Thought Content:  Thought content normal          Judgment: Judgment normal        Najma Dhillon MD

## 2022-12-16 NOTE — ASSESSMENT & PLAN NOTE
Lab Results   Component Value Date    HGBA1C 8 8 (H) 12/08/2022   I reviewed fully with pt  His A1C numbers have been very labile, with elevations always associated with periods of decreased activity and poor diet compliance  Pt is compliant with meds  He is restarting an exercise program, and is working on his diet  Will recheck pt in    He should check BG once or twice a day in the interim

## 2023-01-09 DIAGNOSIS — N52.9 ERECTILE DYSFUNCTION, UNSPECIFIED ERECTILE DYSFUNCTION TYPE: ICD-10-CM

## 2023-01-09 RX ORDER — SILDENAFIL 50 MG/1
TABLET, FILM COATED ORAL
Qty: 30 TABLET | Refills: 3 | Status: SHIPPED | OUTPATIENT
Start: 2023-01-09

## 2023-01-23 DIAGNOSIS — N52.9 ERECTILE DYSFUNCTION, UNSPECIFIED ERECTILE DYSFUNCTION TYPE: ICD-10-CM

## 2023-03-07 ENCOUNTER — TELEPHONE (OUTPATIENT)
Dept: FAMILY MEDICINE CLINIC | Facility: CLINIC | Age: 48
End: 2023-03-07

## 2023-03-07 DIAGNOSIS — E11.9 TYPE 2 DIABETES MELLITUS WITHOUT COMPLICATION, WITHOUT LONG-TERM CURRENT USE OF INSULIN (HCC): Primary | ICD-10-CM

## 2023-03-07 NOTE — TELEPHONE ENCOUNTER
Pt called stating he has an upcoming appt with you and is asking for you to put orders in for blood work

## 2023-03-09 ENCOUNTER — APPOINTMENT (OUTPATIENT)
Dept: LAB | Facility: CLINIC | Age: 48
End: 2023-03-09

## 2023-03-09 DIAGNOSIS — E11.9 TYPE 2 DIABETES MELLITUS WITHOUT COMPLICATION, WITHOUT LONG-TERM CURRENT USE OF INSULIN (HCC): ICD-10-CM

## 2023-03-09 LAB
ANION GAP SERPL CALCULATED.3IONS-SCNC: 8 MMOL/L (ref 4–13)
BUN SERPL-MCNC: 16 MG/DL (ref 5–25)
CALCIUM SERPL-MCNC: 9.9 MG/DL (ref 8.3–10.1)
CHLORIDE SERPL-SCNC: 105 MMOL/L (ref 96–108)
CO2 SERPL-SCNC: 27 MMOL/L (ref 21–32)
CREAT SERPL-MCNC: 1.02 MG/DL (ref 0.6–1.3)
EST. AVERAGE GLUCOSE BLD GHB EST-MCNC: 220 MG/DL
GFR SERPL CREATININE-BSD FRML MDRD: 87 ML/MIN/1.73SQ M
GLUCOSE P FAST SERPL-MCNC: 176 MG/DL (ref 65–99)
HBA1C MFR BLD: 9.3 %
POTASSIUM SERPL-SCNC: 4.3 MMOL/L (ref 3.5–5.3)
SODIUM SERPL-SCNC: 140 MMOL/L (ref 135–147)

## 2023-07-24 DIAGNOSIS — E78.2 MIXED HYPERLIPIDEMIA: ICD-10-CM

## 2023-07-24 RX ORDER — ATORVASTATIN CALCIUM 10 MG/1
TABLET, FILM COATED ORAL
Qty: 90 TABLET | Refills: 3 | Status: SHIPPED | OUTPATIENT
Start: 2023-07-24

## 2023-07-27 ENCOUNTER — OFFICE VISIT (OUTPATIENT)
Dept: URGENT CARE | Facility: CLINIC | Age: 48
End: 2023-07-27
Payer: COMMERCIAL

## 2023-07-27 VITALS
OXYGEN SATURATION: 95 % | HEART RATE: 98 BPM | RESPIRATION RATE: 16 BRPM | SYSTOLIC BLOOD PRESSURE: 117 MMHG | TEMPERATURE: 97.6 F | DIASTOLIC BLOOD PRESSURE: 75 MMHG

## 2023-07-27 DIAGNOSIS — J02.9 SORE THROAT: Primary | ICD-10-CM

## 2023-07-27 LAB — S PYO AG THROAT QL: NEGATIVE

## 2023-07-27 PROCEDURE — 87070 CULTURE OTHR SPECIMN AEROBIC: CPT | Performed by: PHYSICIAN ASSISTANT

## 2023-07-27 PROCEDURE — 87880 STREP A ASSAY W/OPTIC: CPT | Performed by: PHYSICIAN ASSISTANT

## 2023-07-27 PROCEDURE — 99213 OFFICE O/P EST LOW 20 MIN: CPT | Performed by: PHYSICIAN ASSISTANT

## 2023-07-27 NOTE — PROGRESS NOTES
[facility]        NAME: Matilda Llanos is a 50 y.o. male  : 1975    MRN: [id number]  DATE: 2023  TIME: 7:50 PM    Assessment and Plan   Sore throat [J02.9]  1. Sore throat  POCT rapid strepA    Throat culture      Pt presents with symptoms and exam, consistent with acute viral URI. Sore throat is most bothersome symptom, rapid strep performed in clinic is negative, will send for culture and notify patient of any positive results. Discussed symptomatic treatment in the interim. Patient Instructions   Patient Instructions   Rapid strep in the office is negative. Result will be sent for culture as the rapid test is not always accurate. If the result comes back positive we will call you to start antibiotic treatment. If you see the result is positive in your MyChart and do not receive a call within 1-2 hours call the office to request antibiotics. Over the counter antihistamine and/or Flonase as needed. Salt water gargles. Over the counter throat lozenges such as Cepocal or Chloroseptic. If symptoms are not improved in 3-5 days, follow-up with PCP. If symptoms worsen or new symptoms such as fever, drooling, voice changes, anterior neck pain, or difficulty swallowing develop report to the emergency room immediately. Follow up with PCP in 3-5 days. Proceed to  ER if symptoms worsen. Chief Complaint     Chief Complaint   Patient presents with   • Sore Throat     Sore throat, h/a, cough, congestion, started yesterday         History of Present Illness       50year old male presents with complaint of runny nose, congestion, sore throat, and cough that began yesterday. Pt reports sore throat is his worst symptom at this time. Congestion and runny nose improved with Sudafed yesterday. Daughter ill with similar symptoms last week. Review of Systems   Review of Systems   Constitutional: Negative for chills, fatigue and fever.    HENT: Positive for congestion, postnasal drip, rhinorrhea and sore throat. Negative for trouble swallowing and voice change. Respiratory: Positive for cough. Negative for shortness of breath. Cardiovascular: Negative for chest pain. Gastrointestinal: Negative for diarrhea, nausea and vomiting. Musculoskeletal: Positive for myalgias. Neurological: Positive for headaches.          Current Medications       Current Outpatient Medications:   •  atorvastatin (LIPITOR) 10 mg tablet, TAKE 1 TABLET DAILY, Disp: 90 tablet, Rfl: 3  •  glucose blood (FREESTYLE TEST STRIPS) test strip, TEST BLOOD SUGARS ONCE DAILY, Disp: 100 strip, Rfl: 3  •  glucose monitoring kit (FREESTYLE) monitoring kit, Use 1 each daily, Disp: 1 each, Rfl: 1  •  Jardiance 25 MG TABS, TAKE 1 TABLET EVERY MORNING, Disp: 90 tablet, Rfl: 3  •  Lancets (freestyle) lancets, USE ONCE A DAY, Disp: 100 each, Rfl: 3  •  Multiple Vitamin (MULTIVITAMIN PO), Take by mouth in the morning, Disp: , Rfl:   •  sildenafil (VIAGRA) 50 MG tablet, TAKE 1 TABLET DAILY AS NEEDED FOR ERECTILE DYSFUNCTION, Disp: 30 tablet, Rfl: 3  •  indomethacin (INDOCIN) 50 mg capsule, Take 1 capsule (50 mg total) by mouth every 8 (eight) hours as needed for mild pain or moderate pain (Patient not taking: Reported on 7/27/2023), Disp: 270 capsule, Rfl: 0    Current Allergies     Allergies as of 07/27/2023   • (No Known Allergies)            The following portions of the patient's history were reviewed and updated as appropriate: allergies, current medications, past family history, past medical history, past social history, past surgical history and problem list.     Past Medical History:   Diagnosis Date   • Diabetes mellitus ([address])     elevated bloodsugars    • GERD (gastroesophageal reflux disease)    • Hyperlipidemia        Past Surgical History:   Procedure Laterality Date   • CHEST WALL BIOPSY N/A 09/15/2016    Procedure: CHEST LESION EXCISION ;  Surgeon: Toñito Contreras MD;  [facility];  Service:    • NH BX/EXC LYMPH NODE OPEN SUPERFICIAL Right 09/15/2016    Procedure: AXILLARY LYMPH NODE BIOPSY WITH FLOW CYTOMETRY;  Surgeon: Luz Maria Lamb MD;  [facility];  Service: General   • TRUNK SKIN LESION EXCISIONAL BIOPSY      Benign 2.1-3 cm   • UPPER ENDOSCOPY W/ ESOPHAGEAL MANOMETRY     • UPPER GASTROINTESTINAL ENDOSCOPY         Family History   Problem Relation Age of Onset   • Hypertension Mother    • Diabetes Father    • Prostate cancer Father          Medications have been verified. Objective   /75   Pulse 98   Temp 97.6 °F (36.4 °C) (Temporal)   Resp 16   SpO2 95%   No LMP for male patient. Physical Exam     Physical Exam  Vitals and nursing note reviewed. Constitutional:       General: He is not in acute distress. Appearance: Normal appearance. He is not ill-appearing or toxic-appearing. HENT:      Head: Normocephalic and atraumatic. Jaw: No trismus. Right Ear: Hearing, tympanic membrane, ear canal and external ear normal. There is no impacted cerumen. No foreign body. Left Ear: Hearing, tympanic membrane, ear canal and external ear normal. There is no impacted cerumen. No foreign body. Nose: No nasal deformity, mucosal edema, congestion or rhinorrhea. Right Nostril: No foreign body, epistaxis or occlusion. Left Nostril: No foreign body, epistaxis or occlusion. Right Turbinates: Not enlarged, swollen or pale. Left Turbinates: Not enlarged, swollen or pale. Mouth/Throat:      Lips: Pink. No lesions. Mouth: Mucous membranes are moist. No injury, oral lesions or angioedema. Dentition: Normal dentition. Tongue: No lesions. Tongue does not deviate from midline. Palate: No mass and lesions. Pharynx: Uvula midline. No pharyngeal swelling, oropharyngeal exudate, posterior oropharyngeal erythema or uvula swelling. Tonsils: No tonsillar exudate or tonsillar abscesses. 2+ on the right. 2+ on the left.       Comments: Mild erythema and swelling of posterior oropharynx with postnasal drip present. Eyes:      General: Lids are normal. Gaze aligned appropriately. No allergic shiner. Extraocular Movements: Extraocular movements intact. Cardiovascular:      Rate and Rhythm: Normal rate and regular rhythm. Heart sounds: Normal heart sounds, S1 normal and S2 normal. Heart sounds not distant. No murmur heard. No friction rub. No gallop. Pulmonary:      Effort: Pulmonary effort is normal.      Breath sounds: Normal breath sounds. No decreased breath sounds, wheezing, rhonchi or rales. Comments: Patient speaking in full sentences with no increased respiratory effort. No audible wheezing or stridor. Lymphadenopathy:      Cervical: Cervical adenopathy present. Right cervical: Superficial cervical adenopathy present. No deep or posterior cervical adenopathy. Left cervical: Superficial cervical adenopathy present. No deep or posterior cervical adenopathy. Skin:     General: Skin is warm and dry. Neurological:      Mental Status: He is alert and oriented to person, place, and time. Coordination: Coordination is intact. Gait: Gait is intact. Psychiatric:         Attention and Perception: Attention and perception normal.         Mood and Affect: Mood and affect normal.         Speech: Speech normal.         Behavior: Behavior is cooperative. Note: Portions of this record may have been created with voice recognition software. Occasional wrong word or "sound a like" substitutions may have occurred due to the inherent limitations of voice recognition software. Please read the chart carefully and recognize, using context, where substitutions have occurred. *

## 2023-07-27 NOTE — PATIENT INSTRUCTIONS
Rapid strep in the office is negative. Result will be sent for culture as the rapid test is not always accurate. If the result comes back positive we will call you to start antibiotic treatment. If you see the result is positive in your MyChart and do not receive a call within 1-2 hours call the office to request antibiotics. Over the counter antihistamine and/or Flonase as needed. Salt water gargles. Over the counter throat lozenges such as Cepocal or Chloroseptic. If symptoms are not improved in 3-5 days, follow-up with PCP. If symptoms worsen or new symptoms such as fever, drooling, voice changes, anterior neck pain, or difficulty swallowing develop report to the emergency room immediately.

## 2023-07-29 LAB — BACTERIA THROAT CULT: NORMAL

## 2023-08-04 ENCOUNTER — OFFICE VISIT (OUTPATIENT)
Dept: FAMILY MEDICINE CLINIC | Facility: CLINIC | Age: 48
End: 2023-08-04
Payer: COMMERCIAL

## 2023-08-04 VITALS
DIASTOLIC BLOOD PRESSURE: 82 MMHG | TEMPERATURE: 97.2 F | OXYGEN SATURATION: 98 % | HEIGHT: 67 IN | BODY MASS INDEX: 33.84 KG/M2 | SYSTOLIC BLOOD PRESSURE: 128 MMHG | WEIGHT: 215.6 LBS | HEART RATE: 82 BPM

## 2023-08-04 DIAGNOSIS — E78.2 MIXED HYPERLIPIDEMIA: ICD-10-CM

## 2023-08-04 DIAGNOSIS — E11.9 TYPE 2 DIABETES MELLITUS WITHOUT COMPLICATION, WITHOUT LONG-TERM CURRENT USE OF INSULIN (HCC): ICD-10-CM

## 2023-08-04 DIAGNOSIS — Z00.00 ANNUAL PHYSICAL EXAM: Primary | ICD-10-CM

## 2023-08-04 LAB — SL AMB POCT HEMOGLOBIN AIC: 9.9 (ref ?–6.5)

## 2023-08-04 PROCEDURE — 99214 OFFICE O/P EST MOD 30 MIN: CPT | Performed by: FAMILY MEDICINE

## 2023-08-04 PROCEDURE — 99396 PREV VISIT EST AGE 40-64: CPT | Performed by: FAMILY MEDICINE

## 2023-08-04 PROCEDURE — 83036 HEMOGLOBIN GLYCOSYLATED A1C: CPT | Performed by: FAMILY MEDICINE

## 2023-08-04 NOTE — ASSESSMENT & PLAN NOTE
Lab Results   Component Value Date    HGBA1C 9.9 (A) 08/04/2023   Poor control due to noncompliance. I reviewed with pt. Agrees to start Ozempic. Will start 0.25mg qweek x 4w then increase to 0.5mg qweek. Agrees to work on diet and try to increase exercise.  Recheck 3m

## 2023-09-05 DIAGNOSIS — E11.9 TYPE 2 DIABETES MELLITUS WITHOUT COMPLICATION, WITHOUT LONG-TERM CURRENT USE OF INSULIN (HCC): Primary | ICD-10-CM

## 2023-10-11 ENCOUNTER — HOSPITAL ENCOUNTER (EMERGENCY)
Facility: HOSPITAL | Age: 48
Discharge: HOME | End: 2023-10-11
Attending: EMERGENCY MEDICINE
Payer: COMMERCIAL

## 2023-10-11 ENCOUNTER — NURSE TRIAGE (OUTPATIENT)
Age: 48
End: 2023-10-11

## 2023-10-11 VITALS
HEART RATE: 85 BPM | TEMPERATURE: 98 F | OXYGEN SATURATION: 97 % | SYSTOLIC BLOOD PRESSURE: 114 MMHG | DIASTOLIC BLOOD PRESSURE: 85 MMHG | RESPIRATION RATE: 18 BRPM

## 2023-10-11 DIAGNOSIS — G51.0 BELL'S PALSY: Primary | ICD-10-CM

## 2023-10-11 PROCEDURE — 99284 EMERGENCY DEPT VISIT MOD MDM: CPT | Performed by: PHYSICIAN ASSISTANT

## 2023-10-11 PROCEDURE — 99284 EMERGENCY DEPT VISIT MOD MDM: CPT

## 2023-10-11 RX ORDER — MINERAL OIL AND WHITE PETROLATUM 30; 940 MG/G; MG/G
OINTMENT OPHTHALMIC
Qty: 3.5 G | Refills: 0 | Status: SHIPPED | OUTPATIENT
Start: 2023-10-11

## 2023-10-11 RX ORDER — PREDNISONE 20 MG/1
60 TABLET ORAL DAILY
Qty: 21 TABLET | Refills: 0 | Status: SHIPPED | OUTPATIENT
Start: 2023-10-11 | End: 2023-10-18

## 2023-10-11 RX ORDER — ARTIFICIAL TEARS 1; 2; 3 MG/ML; MG/ML; MG/ML
1 SOLUTION/ DROPS OPHTHALMIC 4 TIMES DAILY PRN
Qty: 15 ML | Refills: 0 | Status: SHIPPED | OUTPATIENT
Start: 2023-10-11 | End: 2024-10-10

## 2023-10-11 NOTE — TELEPHONE ENCOUNTER
Reason for Disposition   Neurologic deficit that was brief (now gone), ANY of the following: * Weakness of the face, arm, or leg on one side of the body* Numbness of the face, arm, or leg on one side of the body* Loss of speech or garbled speech    Answer Assessment - Initial Assessment Questions  1. SYMPTOM: "What is the main symptom you are concerned about?" (e.g., weakness, numbness)      Right sided facial numbness  2. ONSET: "When did this start?" (minutes, hours, days; while sleeping)      yesterday  3. LAST NORMAL: "When was the last time you were normal (no symptoms)?"      yesterday  4. PATTERN "Does this come and go, or has it been constant since it started?"  "Is it present now?"      constant  5. CARDIAC SYMPTOMS: "Have you had any of the following symptoms: chest pain, difficulty breathing, palpitations?"      denies  6. NEUROLOGIC SYMPTOMS: "Have you had any of the following symptoms: headache, dizziness, vision loss, double vision, changes in speech, unsteady on your feet?"      Right sided head pressure , hard to blink ,eye is teary  7. OTHER SYMPTOMS: "Do you have any other symptoms?"      Mouth drooping  8.  PREGNANCY: "Is there any chance you are pregnant?" "When was your last menstrual period?"    Protocols used: Neurologic Deficit-ADULT-[place]

## 2023-10-11 NOTE — ED PROVIDER NOTES
History  Chief Complaint   Patient presents with    Facial Droop     Right sided facial droop starting yesterday associated with right sided head pressure. No other deficits, hx of bells palsy approx 12 years ago      78-year-old male presenting today for evaluation of right-sided eye tearing and right-sided facial droop that started last night. Has had this before in the past approximately 12 to 13 years ago when he had Bell's palsy. At that point in time did not have any illness. States that he is otherwise been well over the past week. No recent illnesses. Denies tick bites. Denies headache, neck pain, fevers, cough, congestion, rash, joint swelling, unilateral weakness, slurring of speech. Differential includes but is not limited to Bell's palsy, stroke. Prior to Admission Medications   Prescriptions Last Dose Informant Patient Reported? Taking?    Jardiance 25 MG TABS  Self No No   Sig: TAKE 1 TABLET EVERY MORNING   Lancets (freestyle) lancets  Self No No   Sig: USE ONCE A DAY   Multiple Vitamin (MULTIVITAMIN PO)  Self Yes No   Sig: Take by mouth in the morning   atorvastatin (LIPITOR) 10 mg tablet  Self No No   Sig: TAKE 1 TABLET DAILY   glucose blood (FREESTYLE TEST STRIPS) test strip  Self No No   Sig: TEST BLOOD SUGARS ONCE DAILY   glucose monitoring kit (FREESTYLE) monitoring kit  Self No No   Sig: Use 1 each daily   indomethacin (INDOCIN) 50 mg capsule  Self No No   Sig: Take 1 capsule (50 mg total) by mouth every 8 (eight) hours as needed for mild pain or moderate pain   semaglutide, 0.25 or 0.5 mg/dose, (Ozempic, 0.25 or 0.5 MG/DOSE,) 2 mg/3 mL injection pen   No No   Sig: Inject 0.75 mL (0.5 mg total) under the skin every 7 days   sildenafil (VIAGRA) 50 MG tablet  Self No No   Sig: TAKE 1 TABLET DAILY AS NEEDED FOR ERECTILE DYSFUNCTION      Facility-Administered Medications: None       Past Medical History:   Diagnosis Date    Diabetes mellitus (HCC)     elevated bloodsugars     GERD (gastroesophageal reflux disease)     Hyperlipidemia        Past Surgical History:   Procedure Laterality Date    CHEST WALL BIOPSY N/A 09/15/2016    Procedure: CHEST LESION EXCISION ;  Surgeon: Bronwyn Newton MD;  [facility];  Service:     ME BX/EXC LYMPH NODE OPEN SUPERFICIAL Right 09/15/2016    Procedure: AXILLARY LYMPH NODE BIOPSY WITH FLOW CYTOMETRY;  Surgeon: Bronwyn Newton MD;  Location[facility];  Service: General    TRUNK SKIN LESION EXCISIONAL BIOPSY      Benign 2.1-3 cm    UPPER ENDOSCOPY W/ ESOPHAGEAL MANOMETRY      UPPER GASTROINTESTINAL ENDOSCOPY         Family History   Problem Relation Age of Onset    Hypertension Mother     Diabetes Father     Prostate cancer Father      I have reviewed and agree with the history as documented. E-Cigarette/Vaping    E-Cigarette Use Never User      E-Cigarette/Vaping Substances    Nicotine No     THC No     CBD No     Flavoring No     Other No     Unknown No      Social History     Tobacco Use    Smoking status: Never     Passive exposure: Never    Smokeless tobacco: Never   Vaping Use    Vaping Use: Never used   Substance Use Topics    Alcohol use: Yes     Comment: socially per AS    Drug use: No       Review of Systems   Constitutional: Negative. Negative for chills, fatigue and fever. HENT: Negative. Negative for congestion, postnasal drip, rhinorrhea and sore throat. Eyes: Negative. Respiratory: Negative. Negative for cough, shortness of breath and wheezing. Cardiovascular: Negative. Gastrointestinal: Negative. Negative for abdominal pain, diarrhea, nausea and vomiting. Endocrine: Negative. Genitourinary: Negative. Musculoskeletal: Negative. Skin: Negative. Neurological: Negative. Hematological: Negative. Psychiatric/Behavioral: Negative. All other systems reviewed and are negative. Physical Exam  Physical Exam  Vitals and nursing note reviewed.    Constitutional:       General: He is not in acute distress. Appearance: He is well-developed. He is not diaphoretic. HENT:      Head: Normocephalic and atraumatic. Right Ear: External ear normal.      Left Ear: External ear normal.      Nose: Nose normal.      Mouth/Throat:      Pharynx: No oropharyngeal exudate. Eyes:      General: No scleral icterus. Right eye: No discharge. Left eye: No discharge. Conjunctiva/sclera: Conjunctivae normal.      Pupils: Pupils are equal, round, and reactive to light. Cardiovascular:      Rate and Rhythm: Normal rate and regular rhythm. Pulses: Normal pulses. Heart sounds: Normal heart sounds. No murmur heard. No friction rub. No gallop. Pulmonary:      Effort: Pulmonary effort is normal. No respiratory distress. Breath sounds: Normal breath sounds. No wheezing or rales. Chest:      Chest wall: No tenderness. Abdominal:      General: Bowel sounds are normal. There is no distension. Palpations: Abdomen is soft. There is no mass. Tenderness: There is no abdominal tenderness. There is no guarding or rebound. Hernia: No hernia is present. Musculoskeletal:      Cervical back: Normal range of motion and neck supple. Lymphadenopathy:      Cervical: No cervical adenopathy. Skin:     General: Skin is warm and dry. Capillary Refill: Capillary refill takes less than 2 seconds. Coloration: Skin is not pale. Findings: No erythema or rash. Neurological:      General: No focal deficit present. Mental Status: He is alert and oriented to person, place, and time. Cranial Nerves: Cranial nerve deficit present.          Vital Signs  ED Triage Vitals [10/11/23 0850]   Temperature Pulse Respirations Blood Pressure SpO2   98 °F (36.7 °C) 85 18 114/85 97 %      Temp Source Heart Rate Source Patient Position - Orthostatic VS BP Location FiO2 (%)   Oral Monitor Sitting Left arm --      Pain Score       --           Vitals:    10/11/23 0850   BP: 114/85   Pulse: 85   Patient Position - Orthostatic VS: Sitting         Visual Acuity      ED Medications  Medications - No data to display    Diagnostic Studies  Results Reviewed       None                   No orders to display              Procedures  Procedures         ED Course                  Stroke Assessment       Row Name 10/11/23 0918             NIH Stroke Scale    Interval --      Level of Consciousness (1a.) 0      LOC Questions (1b.) 0      LOC Commands (1c.) 0      Best Gaze (2.) --      Visual (3.) 0      Facial Palsy (4.) 3      Motor Arm, Left (5a.) 0      Motor Arm, Right (5b.) 0      Motor Leg, Left (6a.) 0      Motor Leg, Right (6b.) 0      Limb Ataxia (7.) 0      Sensory (8.) 0      Best Language (9.) 0      Dysarthria (10.) 0      Extinction and Inattention (11.) (Formerly Neglect) 0      Total --                                              Medical Decision Making  Discussed case with attending Dr. Thalia Mark. No rashes. Patient defers lyme testing. Agreeable to prednisone and neuro follow up with Bell's Palsy- patient has right sided facial paralysis, encompassing the right forehead, eyebrow and mouth. Prescribed artifical tears for throughout the day and gel for nighttime to avoid ocular injury- informed that this will temporarily cause blurry vision. Patient is informed to return to the emergency department for worsening of symptoms and was given proper education regarding their diagnosis and symptoms. Otherwise the patient is informed to follow up with their primary care doctor for re-evaluation. The patient verbalizes understanding and agrees with above assessment and plan. All questions were answered. Risk  OTC drugs. Prescription drug management.              Disposition  Final diagnoses:   Bell's palsy     Time reflects when diagnosis was documented in both MDM as applicable and the Disposition within this note       Time User Action Codes Description Comment    10/11/2023 9:05 AM Imani Crow Add [G51.0] Bell's palsy           ED Disposition       ED Disposition   Discharge    Condition   Stable    Date/Time   Wed Oct 11, 2023  9:05 AM    Comment   Chana Delgado discharge to home/self care.                    Follow-up Information       Follow up With Specialties Details Why Contact Info Additional Information    [address] Emergency Department Emergency Medicine Go to  If symptoms worsen, otherwise please follow up with your family doctor/ neurologist [address] [place] [address] [address] [address]  [address] [facility] Emergency Department, [address] [place] [address] [facility], [address], MD Neurology Schedule an appointment as soon as possible for a visit in 1 week  [facility]  [phone]               Discharge Medication List as of 10/11/2023  9:10 AM        START taking these medications    Details   Artificial Tears ophthalmic solution Administer 1 drop to the right eye 4 (four) times a day as needed (for tearing), Starting Wed 10/11/2023, Until u 10/10/2024 at 2359, Normal      predniSONE 20 mg tablet Take 3 tablets (60 mg total) by mouth daily for 7 days, Starting Wed 10/11/2023, Until Wed 10/18/2023, Normal      White Petrolatum-Mineral Oil (Systane Nighttime) OINT Apply to eye daily at bedtime To the right eye as needed, Starting Wed 10/11/2023, Normal           CONTINUE these medications which have NOT CHANGED    Details   atorvastatin (LIPITOR) 10 mg tablet TAKE 1 TABLET DAILY, Normal      glucose blood (FREESTYLE TEST STRIPS) test strip TEST BLOOD SUGARS ONCE DAILY, Normal      glucose monitoring kit (FREESTYLE) monitoring kit Use 1 each daily, Starting Mon 12/14/2020, Normal      indomethacin (INDOCIN) 50 mg capsule Take 1 capsule (50 mg total) by mouth every 8 (eight) hours as needed for mild pain or moderate pain, Starting Wed 4/13/2022, Normal      Jardiance 25 MG TABS TAKE 1 TABLET EVERY MORNING, Normal      Lancets (freestyle) lancets USE ONCE A DAY, Normal      Multiple Vitamin (MULTIVITAMIN PO) Take by mouth in the morning, Historical Med      semaglutide, 0.25 or 0.5 mg/dose, (Ozempic, 0.25 or 0.5 MG/DOSE,) 2 mg/3 mL injection pen Inject 0.75 mL (0.5 mg total) under the skin every 7 days, Starting Tue 9/5/2023, Normal      sildenafil (VIAGRA) 50 MG tablet TAKE 1 TABLET DAILY AS NEEDED FOR ERECTILE DYSFUNCTION, Normal             No discharge procedures on file.     PDMP Review       None            ED Provider  Electronically Signed by             Angus Spurling, PA-C  10/11/23 9320

## 2023-10-13 ENCOUNTER — TELEPHONE (OUTPATIENT)
Dept: NEUROLOGY | Facility: CLINIC | Age: 48
End: 2023-10-13

## 2023-10-13 NOTE — TELEPHONE ENCOUNTER
ADD ON  Pt was in  [facility] ED for facial droop and Manderson Palsy.   Scheduled pt 11/16 @ 3:00 in [facility] with Dr. Nate Coffman

## 2023-10-16 ENCOUNTER — OFFICE VISIT (OUTPATIENT)
Dept: NEUROLOGY | Facility: CLINIC | Age: 48
End: 2023-10-16
Payer: COMMERCIAL

## 2023-10-16 VITALS
SYSTOLIC BLOOD PRESSURE: 100 MMHG | OXYGEN SATURATION: 94 % | TEMPERATURE: 97.2 F | HEIGHT: 67 IN | HEART RATE: 87 BPM | WEIGHT: 214.2 LBS | DIASTOLIC BLOOD PRESSURE: 66 MMHG | BODY MASS INDEX: 33.62 KG/M2

## 2023-10-16 DIAGNOSIS — R29.810 FACIAL WEAKNESS: Primary | ICD-10-CM

## 2023-10-16 DIAGNOSIS — G51.0: ICD-10-CM

## 2023-10-16 PROCEDURE — 99204 OFFICE O/P NEW MOD 45 MIN: CPT | Performed by: PSYCHIATRY & NEUROLOGY

## 2023-10-16 RX ORDER — VALACYCLOVIR HYDROCHLORIDE 1 G/1
1000 TABLET, FILM COATED ORAL 2 TIMES DAILY
Qty: 10 TABLET | Refills: 0 | Status: SHIPPED | OUTPATIENT
Start: 2023-10-16 | End: 2023-10-21

## 2023-10-16 NOTE — PROGRESS NOTES
Patient ID: Jose Crum is a 50 y.o. male. Assessment/Plan:           Problem List Items Addressed This Visit          Other    Facial weakness - Primary    Relevant Medications    valACYclovir (VALTREX) 1,000 mg tablet    Other Relevant Orders    Ambulatory Referral to Physical Therapy    Lyme Total AB W Reflex to IGM/IGG    MRI brain with and without contrast     Other Visit Diagnoses       Familial recurrent peripheral facial palsy        Relevant Orders    MRI brain with and without contrast           Mr. Delgado has presented to [facility] for relation of all 4 quadrants facial paralysis. Patient described having initial clinical presentation and on 12 years ago for right-sided facial paralysis took place and self resolved within 2 months. Patient states that she developed facial paralysis this time on October 10, 2023 when his right eyes started tearing and by the morning time he denies of the face was paralyzed. Patient is not palpable to close his right eye with 4-5 mm gap noted upon closure of his right eye, patient is not capable to bring right eyebrow up with sensory dysfunction in his tongue described in addition to complete paralysis of right side of the face. House-Brackmann score is is V- VI;    Patient will be advised to complete valacyclovir 1000 mg twice daily for next 5 days in addition to the Lyme test to evaluate if doxycycline required to be added as well. Patient is to proceed with MRI brain with and without contrast.  Physical therapy referral will be advised as well considering complete paralysis of right side of face. Patient is to follow [facility] neurology within 3 months. Subjective: Right-sided facial paralysis. HPI  Mr. Reyes Beverage is a 44-year-old male who presented to [facility] for evaluation.   Patient presented to emergency department on October 11, 2023 for evaluation of right-sided eye tearing and right-sided facial droop was started the night prior to admission. Patient had Bell's palsy 12-13 years ago. This time patient has no signs of rash to consider shingles patient deferred Lyme disease testing. Patient completed prednisone treatment. Initial clinical presentation was consistent with severe headache no hearing problem and numbness in his tongue. No current headache but pressure sensation is minimal if any. Patient has not had any improvement in facial weakness since last week concerning inability to move right side of the face and inability to close his right eye at this point. Patient completed colonoscopy without concerns. Numbness in the tongue has been reported. No exposure to ticks. No recent infection. Patient has never been vaccinated against shingles, patient doubt he had chickenpox. No other sensorimotor dysfunction involving upper or lower extremities at this point. The following portions of the patient's history were reviewed and updated as appropriate: He  has a past medical history of Diabetes mellitus ([facility]), GERD (gastroesophageal reflux disease), and Hyperlipidemia. He   Patient Active Problem List    Diagnosis Date Noted    Facial weakness 10/16/2023    Arthralgia of right knee 11/30/2015    GERD without esophagitis 11/14/2014    Hyperlipidemia 01/15/2014    Type 2 diabetes mellitus ([address]) 01/14/2014    Gout 12/04/2013     He  has a past surgical history that includes Upper endoscopy w/ esophageal manometry; pr bx/exc lymph node open superficial (Right, 09/15/2016); Chest wall biopsy (N/A, 09/15/2016); Trunk skin lesion excisional biopsy; and Upper gastrointestinal endoscopy. His family history includes Diabetes in his father; Hypertension in his mother; Prostate cancer in his father. He  reports that he has never smoked. He has never been exposed to tobacco smoke. He has never used smokeless tobacco. He reports current alcohol use.  He reports that he does not use drugs.  Current Outpatient Medications   Medication Sig Dispense Refill    Artificial Tears ophthalmic solution Administer 1 drop to the right eye 4 (four) times a day as needed (for tearing) 15 mL 0    atorvastatin (LIPITOR) 10 mg tablet TAKE 1 TABLET DAILY 90 tablet 3    glucose blood (FREESTYLE TEST STRIPS) test strip TEST BLOOD SUGARS ONCE DAILY 100 strip 3    glucose monitoring kit (FREESTYLE) monitoring kit Use 1 each daily 1 each 1    indomethacin (INDOCIN) 50 mg capsule Take 1 capsule (50 mg total) by mouth every 8 (eight) hours as needed for mild pain or moderate pain 270 capsule 0    Jardiance 25 MG TABS TAKE 1 TABLET EVERY MORNING 90 tablet 3    Lancets (freestyle) lancets USE ONCE A  each 3    Multiple Vitamin (MULTIVITAMIN PO) Take by mouth in the morning      predniSONE 20 mg tablet Take 3 tablets (60 mg total) by mouth daily for 7 days 21 tablet 0    semaglutide, 0.25 or 0.5 mg/dose, (Ozempic, 0.25 or 0.5 MG/DOSE,) 2 mg/3 mL injection pen Inject 0.75 mL (0.5 mg total) under the skin every 7 days 9 mL 1    sildenafil (VIAGRA) 50 MG tablet TAKE 1 TABLET DAILY AS NEEDED FOR ERECTILE DYSFUNCTION 30 tablet 3    valACYclovir (VALTREX) 1,000 mg tablet Take 1 tablet (1,000 mg total) by mouth 2 (two) times a day for 5 days 10 tablet 0    White Petrolatum-Mineral Oil (Systane Nighttime) OINT Apply to eye daily at bedtime To the right eye as needed 3.5 g 0     No current facility-administered medications for this visit.      Current Outpatient Medications on File Prior to Visit   Medication Sig    Artificial Tears ophthalmic solution Administer 1 drop to the right eye 4 (four) times a day as needed (for tearing)    atorvastatin (LIPITOR) 10 mg tablet TAKE 1 TABLET DAILY    glucose blood (FREESTYLE TEST STRIPS) test strip TEST BLOOD SUGARS ONCE DAILY    glucose monitoring kit (FREESTYLE) monitoring kit Use 1 each daily    indomethacin (INDOCIN) 50 mg capsule Take 1 capsule (50 mg total) by mouth every 8 (eight) hours as needed for mild pain or moderate pain    Jardiance 25 MG TABS TAKE 1 TABLET EVERY MORNING    Lancets (freestyle) lancets USE ONCE A DAY    Multiple Vitamin (MULTIVITAMIN PO) Take by mouth in the morning    predniSONE 20 mg tablet Take 3 tablets (60 mg total) by mouth daily for 7 days    semaglutide, 0.25 or 0.5 mg/dose, (Ozempic, 0.25 or 0.5 MG/DOSE,) 2 mg/3 mL injection pen Inject 0.75 mL (0.5 mg total) under the skin every 7 days    sildenafil (VIAGRA) 50 MG tablet TAKE 1 TABLET DAILY AS NEEDED FOR ERECTILE DYSFUNCTION    White Petrolatum-Mineral Oil (Systane Nighttime) OINT Apply to eye daily at bedtime To the right eye as needed     No current facility-administered medications on file prior to visit. He has No Known Allergies. .         Objective:    Blood pressure 100/66, pulse 87, temperature (!) 97.2 °F (36.2 °C), temperature source Temporal, height 5' 7" (1.702 m), weight 97.2 kg (214 lb 3.2 oz), SpO2 94 %. Physical Exam    Neurological Exam  CONSTITUTIONAL: NAD, pleasant. NECK: supple, no lymphadenopathy, no thyromegaly, no JVD. CARDIOVASCULAR: RRR, normal S1S2, no murmurs, no rubs. RESP: clear to auscultation bilaterally, no wheezes/rhonchi/rales. ABDOMEN: soft, non tender, non distended. SKIN: no rash or skin lesions. EXTREMITIES: no edema, pulses 2+bilaterally. PSYCH: appropriate mood and affect  NEUROLOGIC COMPREHENSIVE EXAM: Patient is oriented to person, place and time, NAD; appropriate affect. CN II, III, IV, V, VI, VII,VIII,IX,X,XI-XII intact with EOMI, PERRLA, OKN intact, VF grossly intact, fundi poorly visualized secondary to pupillary constriction; asymmetric face noted, complete right-sided facial paralysis appreciated on exam, patient is unable to close his right eye with difficulties bringing the right eyebrow up with no muscle movement in right side of the cheek.  Motor: 5/5 UE/LE bilateral symmetric; Sensory: intact to light touch and pinprick bilaterally; normal vibration sensation feet bilaterally; Coordination within normal limits on FTN and YAZMIN testing; DTR: 2/4 through, no Babinski, no clonus. Tandem gait is intact. Romberg: absent. ROS:    Review of Systems   Constitutional:  Negative for appetite change, fatigue and fever. HENT: Negative. Negative for hearing loss, tinnitus, trouble swallowing and voice change. Eyes: Negative. Negative for photophobia, pain and visual disturbance. Respiratory: Negative. Negative for shortness of breath. Cardiovascular: Negative. Negative for palpitations. Gastrointestinal: Negative. Negative for nausea and vomiting. Endocrine: Negative. Negative for cold intolerance. Genitourinary: Negative. Negative for dysuria, frequency and urgency. Musculoskeletal:  Negative for back pain, gait problem, myalgias and neck pain. Skin: Negative. Negative for rash. Allergic/Immunologic: Negative. Neurological:  Negative for dizziness, tremors, seizures, syncope, speech difficulty, weakness, light-headedness, numbness and headaches. RT SIDE bells palsy   Hematological: Negative. Does not bruise/bleed easily. Psychiatric/Behavioral: Negative. Negative for confusion, hallucinations and sleep disturbance.

## 2023-10-19 ENCOUNTER — TELEPHONE (OUTPATIENT)
Age: 48
End: 2023-10-19

## 2023-10-19 DIAGNOSIS — E11.9 TYPE 2 DIABETES MELLITUS WITHOUT COMPLICATION, WITHOUT LONG-TERM CURRENT USE OF INSULIN (HCC): Primary | ICD-10-CM

## 2023-10-19 NOTE — TELEPHONE ENCOUNTER
James Burkett called the patient would like to go for his 3 month bloodwork tomorrow   could the orders be put in     call yohana when done thank you stated

## 2023-10-20 DIAGNOSIS — E11.9 TYPE 2 DIABETES MELLITUS WITHOUT COMPLICATION, WITHOUT LONG-TERM CURRENT USE OF INSULIN (HCC): ICD-10-CM

## 2023-10-23 ENCOUNTER — APPOINTMENT (OUTPATIENT)
Dept: LAB | Facility: CLINIC | Age: 48
End: 2023-10-23
Payer: COMMERCIAL

## 2023-10-23 DIAGNOSIS — E11.9 TYPE 2 DIABETES MELLITUS WITHOUT COMPLICATION, WITHOUT LONG-TERM CURRENT USE OF INSULIN (HCC): ICD-10-CM

## 2023-10-23 DIAGNOSIS — R29.810 FACIAL WEAKNESS: ICD-10-CM

## 2023-10-23 LAB
ALBUMIN SERPL BCP-MCNC: 4.3 G/DL (ref 3.5–5)
ALP SERPL-CCNC: 50 U/L (ref 34–104)
ALT SERPL W P-5'-P-CCNC: 29 U/L (ref 7–52)
ANION GAP SERPL CALCULATED.3IONS-SCNC: 6 MMOL/L
AST SERPL W P-5'-P-CCNC: 14 U/L (ref 13–39)
B BURGDOR IGG+IGM SER QL IA: NEGATIVE
BASOPHILS # BLD AUTO: 0.03 THOUSANDS/ÂΜL (ref 0–0.1)
BASOPHILS NFR BLD AUTO: 0 % (ref 0–1)
BILIRUB SERPL-MCNC: 0.74 MG/DL (ref 0.2–1)
BUN SERPL-MCNC: 21 MG/DL (ref 5–25)
CALCIUM SERPL-MCNC: 9.6 MG/DL (ref 8.4–10.2)
CHLORIDE SERPL-SCNC: 102 MMOL/L (ref 96–108)
CHOLEST SERPL-MCNC: 180 MG/DL
CO2 SERPL-SCNC: 31 MMOL/L (ref 21–32)
CREAT SERPL-MCNC: 1.05 MG/DL (ref 0.6–1.3)
CREAT UR-MCNC: 71.8 MG/DL
EOSINOPHIL # BLD AUTO: 0.16 THOUSAND/ÂΜL (ref 0–0.61)
EOSINOPHIL NFR BLD AUTO: 2 % (ref 0–6)
ERYTHROCYTE [DISTWIDTH] IN BLOOD BY AUTOMATED COUNT: 12.4 % (ref 11.6–15.1)
EST. AVERAGE GLUCOSE BLD GHB EST-MCNC: 197 MG/DL
GFR SERPL CREATININE-BSD FRML MDRD: 83 ML/MIN/1.73SQ M
GLUCOSE P FAST SERPL-MCNC: 155 MG/DL (ref 65–99)
HBA1C MFR BLD: 8.5 %
HCT VFR BLD AUTO: 51.4 % (ref 36.5–49.3)
HDLC SERPL-MCNC: 44 MG/DL
HGB BLD-MCNC: 16.9 G/DL (ref 12–17)
IMM GRANULOCYTES # BLD AUTO: 0.05 THOUSAND/UL (ref 0–0.2)
IMM GRANULOCYTES NFR BLD AUTO: 1 % (ref 0–2)
LDLC SERPL CALC-MCNC: 105 MG/DL (ref 0–100)
LYMPHOCYTES # BLD AUTO: 2.42 THOUSANDS/ÂΜL (ref 0.6–4.47)
LYMPHOCYTES NFR BLD AUTO: 33 % (ref 14–44)
MCH RBC QN AUTO: 28.5 PG (ref 26.8–34.3)
MCHC RBC AUTO-ENTMCNC: 32.9 G/DL (ref 31.4–37.4)
MCV RBC AUTO: 87 FL (ref 82–98)
MICROALBUMIN UR-MCNC: <7 MG/L
MICROALBUMIN/CREAT 24H UR: <10 MG/G CREATININE (ref 0–30)
MONOCYTES # BLD AUTO: 0.57 THOUSAND/ÂΜL (ref 0.17–1.22)
MONOCYTES NFR BLD AUTO: 8 % (ref 4–12)
NEUTROPHILS # BLD AUTO: 4.17 THOUSANDS/ÂΜL (ref 1.85–7.62)
NEUTS SEG NFR BLD AUTO: 56 % (ref 43–75)
NRBC BLD AUTO-RTO: 0 /100 WBCS
PLATELET # BLD AUTO: 237 THOUSANDS/UL (ref 149–390)
PMV BLD AUTO: 10.4 FL (ref 8.9–12.7)
POTASSIUM SERPL-SCNC: 4.7 MMOL/L (ref 3.5–5.3)
PROT SERPL-MCNC: 7.3 G/DL (ref 6.4–8.4)
RBC # BLD AUTO: 5.93 MILLION/UL (ref 3.88–5.62)
SODIUM SERPL-SCNC: 139 MMOL/L (ref 135–147)
TRIGL SERPL-MCNC: 156 MG/DL
WBC # BLD AUTO: 7.4 THOUSAND/UL (ref 4.31–10.16)

## 2023-10-23 PROCEDURE — 80053 COMPREHEN METABOLIC PANEL: CPT

## 2023-10-23 PROCEDURE — 83036 HEMOGLOBIN GLYCOSYLATED A1C: CPT

## 2023-10-23 PROCEDURE — 36415 COLL VENOUS BLD VENIPUNCTURE: CPT

## 2023-10-23 PROCEDURE — 86618 LYME DISEASE ANTIBODY: CPT

## 2023-10-23 PROCEDURE — 85025 COMPLETE CBC W/AUTO DIFF WBC: CPT

## 2023-10-23 PROCEDURE — 80061 LIPID PANEL: CPT

## 2023-10-23 RX ORDER — EMPAGLIFLOZIN 25 MG/1
TABLET, FILM COATED ORAL
Qty: 90 TABLET | Refills: 1 | Status: SHIPPED | OUTPATIENT
Start: 2023-10-23

## 2023-10-26 DIAGNOSIS — N52.9 ERECTILE DYSFUNCTION, UNSPECIFIED ERECTILE DYSFUNCTION TYPE: ICD-10-CM

## 2023-10-26 RX ORDER — SILDENAFIL 50 MG/1
TABLET, FILM COATED ORAL
Qty: 30 TABLET | Refills: 3 | Status: SHIPPED | OUTPATIENT
Start: 2023-10-26

## 2023-11-03 PROCEDURE — 99282 EMERGENCY DEPT VISIT SF MDM: CPT

## 2023-11-04 ENCOUNTER — HOSPITAL ENCOUNTER (EMERGENCY)
Facility: HOSPITAL | Age: 48
Discharge: HOME | End: 2023-11-04
Attending: EMERGENCY MEDICINE
Payer: COMMERCIAL

## 2023-11-04 VITALS
BODY MASS INDEX: 32.18 KG/M2 | HEIGHT: 67 IN | DIASTOLIC BLOOD PRESSURE: 89 MMHG | SYSTOLIC BLOOD PRESSURE: 139 MMHG | RESPIRATION RATE: 18 BRPM | HEART RATE: 120 BPM | OXYGEN SATURATION: 98 % | WEIGHT: 205 LBS

## 2023-11-04 DIAGNOSIS — K59.03 DRUG-INDUCED CONSTIPATION: Primary | ICD-10-CM

## 2023-11-04 PROCEDURE — 99284 EMERGENCY DEPT VISIT MOD MDM: CPT | Performed by: EMERGENCY MEDICINE

## 2023-11-04 RX ORDER — MAGNESIUM CARB/ALUMINUM HYDROX 105-160MG
296 TABLET,CHEWABLE ORAL ONCE
Status: DISCONTINUED | OUTPATIENT
Start: 2023-11-04 | End: 2023-11-04

## 2023-11-04 RX ORDER — DICYCLOMINE HYDROCHLORIDE 10 MG/1
20 CAPSULE ORAL ONCE
Status: COMPLETED | OUTPATIENT
Start: 2023-11-04 | End: 2023-11-04

## 2023-11-04 RX ORDER — ENEMA 19; 7 G/133ML; G/133ML
1 ENEMA RECTAL ONCE
Status: COMPLETED | OUTPATIENT
Start: 2023-11-04 | End: 2023-11-04

## 2023-11-04 RX ORDER — POLYETHYLENE GLYCOL 3350 17 G/17G
17 POWDER, FOR SOLUTION ORAL DAILY
Qty: 30 EACH | Refills: 0 | Status: SHIPPED | OUTPATIENT
Start: 2023-11-04

## 2023-11-04 RX ADMIN — SODIUM PHOSPHATE 1 ENEMA: 7; 19 ENEMA RECTAL at 02:08

## 2023-11-04 RX ADMIN — DICYCLOMINE HYDROCHLORIDE 20 MG: 10 CAPSULE ORAL at 02:07

## 2023-11-04 NOTE — ED PROVIDER NOTES
History  Chief Complaint   Patient presents with    Constipation     Pt c/o difficulty having a BM. Last went about 4 days prior. Pt took stool softer yesterday and suppository tonight around 250.      70-year-old male past medical significant for diabetes recently put on Ozempic about 1 month ago presenting to ED today for difficulty having a bowel movement. Patient states that its been about 4 days since he had a bowel movement. He tried taking a stool softener yesterday as well as suppository however he has not had a bowel movement. He is complaining of sensation that he needs to go however he is unable to pass a bowel movement. He is also having some abdominal cramping. Denies any nausea or vomiting. Still tolerating p.o. Prior to Admission Medications   Prescriptions Last Dose Informant Patient Reported? Taking?    Artificial Tears ophthalmic solution  Self No No   Sig: Administer 1 drop to the right eye 4 (four) times a day as needed (for tearing)   Empagliflozin (Jardiance) 25 MG TABS   No No   Sig: TAKE 1 TABLET EVERY MORNING   Lancets (freestyle) lancets  Self No No   Sig: USE ONCE A DAY   Multiple Vitamin (MULTIVITAMIN PO)  Self Yes No   Sig: Take by mouth in the morning   White Petrolatum-Mineral Oil (Systane Nighttime) OINT  Self No No   Sig: Apply to eye daily at bedtime To the right eye as needed   atorvastatin (LIPITOR) 10 mg tablet  Self No No   Sig: TAKE 1 TABLET DAILY   glucose blood (FREESTYLE TEST STRIPS) test strip  Self No No   Sig: TEST BLOOD SUGARS ONCE DAILY   glucose monitoring kit (FREESTYLE) monitoring kit  Self No No   Sig: Use 1 each daily   indomethacin (INDOCIN) 50 mg capsule  Self No No   Sig: Take 1 capsule (50 mg total) by mouth every 8 (eight) hours as needed for mild pain or moderate pain   semaglutide, 0.25 or 0.5 mg/dose, (Ozempic, 0.25 or 0.5 MG/DOSE,) 2 mg/3 mL injection pen  Self No No   Sig: Inject 0.75 mL (0.5 mg total) under the skin every 7 days   sildenafil (VIAGRA) 50 MG tablet   No No   Sig: TAKE 1 TABLET DAILY AS NEEDED FOR ERECTILE DYSFUNCTION   valACYclovir (VALTREX) 1,000 mg tablet   No No   Sig: Take 1 tablet (1,000 mg total) by mouth 2 (two) times a day for 5 days      Facility-Administered Medications: None       Past Medical History:   Diagnosis Date    Bell's palsy     Diabetes mellitus (HCC)     elevated bloodsugars     GERD (gastroesophageal reflux disease)     Hyperlipidemia        Past Surgical History:   Procedure Laterality Date    CHEST WALL BIOPSY N/A 09/15/2016    Procedure: CHEST LESION EXCISION ;  Surgeon: Miguel Angel Vega MD;  [facility];  Service:     AL BX/EXC LYMPH NODE OPEN SUPERFICIAL Right 09/15/2016    Procedure: AXILLARY LYMPH NODE BIOPSY WITH FLOW CYTOMETRY;  Surgeon: Miguel Angel Vega MD;  [facility];  Service: General    TRUNK SKIN LESION EXCISIONAL BIOPSY      Benign 2.1-3 cm    UPPER ENDOSCOPY W/ ESOPHAGEAL MANOMETRY      UPPER GASTROINTESTINAL ENDOSCOPY         Family History   Problem Relation Age of Onset    Hypertension Mother     Diabetes Father     Prostate cancer Father      I have reviewed and agree with the history as documented. E-Cigarette/Vaping    E-Cigarette Use Never User      E-Cigarette/Vaping Substances    Nicotine No     THC No     CBD No     Flavoring No     Other No     Unknown No      Social History     Tobacco Use    Smoking status: Never     Passive exposure: Never    Smokeless tobacco: Never   Vaping Use    Vaping Use: Never used   Substance Use Topics    Alcohol use: Not Currently     Comment: socially per AS    Drug use: No       Review of Systems   Constitutional:  Negative for chills and fever. HENT:  Negative for hearing loss. Eyes:  Negative for visual disturbance. Respiratory:  Negative for shortness of breath. Cardiovascular:  Negative for chest pain. Gastrointestinal:  Positive for abdominal pain and constipation. Negative for diarrhea, nausea and vomiting.    Genitourinary: Negative for difficulty urinating. Musculoskeletal:  Negative for myalgias. Skin:  Negative for rash. Neurological:  Negative for dizziness. Psychiatric/Behavioral:  Negative for agitation. All other systems reviewed and are negative. Physical Exam  Physical Exam  Vitals and nursing note reviewed. Constitutional:       General: He is not in acute distress. Appearance: Normal appearance. He is not ill-appearing. HENT:      Head: Normocephalic and atraumatic. Right Ear: External ear normal.      Left Ear: External ear normal.      Nose: Nose normal. No congestion. Mouth/Throat:      Mouth: Mucous membranes are moist.      Pharynx: Oropharynx is clear. No oropharyngeal exudate. Eyes:      General:         Right eye: No discharge. Left eye: No discharge. Extraocular Movements: Extraocular movements intact. Conjunctiva/sclera: Conjunctivae normal.      Pupils: Pupils are equal, round, and reactive to light. Cardiovascular:      Rate and Rhythm: Normal rate and regular rhythm. Pulses: Normal pulses. Heart sounds: Normal heart sounds. No murmur heard. Pulmonary:      Effort: Pulmonary effort is normal.      Breath sounds: Normal breath sounds. Abdominal:      General: Abdomen is flat. Bowel sounds are normal.      Palpations: Abdomen is soft. Comments: Lower abdominal tenderness present. Musculoskeletal:         General: No swelling. Normal range of motion. Cervical back: Normal range of motion. No rigidity. Skin:     General: Skin is warm and dry. Capillary Refill: Capillary refill takes less than 2 seconds. Neurological:      General: No focal deficit present. Mental Status: He is alert and oriented to person, place, and time. Mental status is at baseline. Motor: No weakness.       Gait: Gait normal.   Psychiatric:         Mood and Affect: Mood normal.         Behavior: Behavior normal.         Vital Signs  ED Triage Vitals [11/04/23 0010]   Temp Pulse Respirations Blood Pressure SpO2   -- (!) 120 18 139/89 98 %      Temp src Heart Rate Source Patient Position - Orthostatic VS BP Location FiO2 (%)   -- Monitor Standing Left arm --      Pain Score       8           Vitals:    11/04/23 0010   BP: 139/89   Pulse: (!) 120   Patient Position - Orthostatic VS: Standing         Visual Acuity      ED Medications  Medications   sodium phosphate-biphosphate (FLEET) enema 1 enema (1 enema Rectal Given 11/4/23 0208)   dicyclomine (BENTYL) capsule 20 mg (20 mg Oral Given 11/4/23 0207)       Diagnostic Studies  Results Reviewed       None                   No orders to display              Procedures  Procedures         ED Course  ED Course as of 11/04/23 0237   Sat Nov 04, 2023   0158 Patient had no bowel movement with the soaps suds enema. Discussed with him that we can try a fleets and if that doesn't work than we send him home with polyethene glycol for bowel prep for colonoscopy for home. SBIRT 22yo+      Flowsheet Row Most Recent Value   Initial Alcohol Screen: US AUDIT-C     1. How often do you have a drink containing alcohol? 1 Filed at: 11/04/2023 0203   2. How many drinks containing alcohol do you have on a typical day you are drinking? 1 Filed at: 11/04/2023 0203   3a. Male UNDER 65: How often do you have five or more drinks on one occasion? 0 Filed at: 11/04/2023 9818   Audit-C Score 2 Filed at: 11/04/2023 0203   NATALIYA: How many times in the past year have you. .. Used an illegal drug or used a prescription medication for non-medical reasons? Never Filed at: 11/04/2023 2346                      Medical Decision Making  80-year-old male presenting to ED today with constipation. Likely secondary to the Ozempic use. After reviewing his medications no other medications I think that could be causing his constipation.   Initially wanted to use mag citrate however recently do not have it and so we will try soapsuds enema first.  That did not work and so we tried a fleets enema. I told the patient if that does not work then maybe we would have to send him home with a bowel prep regimen as if he was having a colonoscopy. However patient did have a large bowel movement and had much relief with the bowel movement. Again likely secondary to the Ozempic use. Encouraged outpatient follow-up. Strict return precautions discussed. MiraLAX prescription sent to the pharmacy. I discussed with patient that he should start with once daily and titrate to bowel movements. If they are to soft tissue go to every other day and so forth. Patient discharged home. Risk  OTC drugs. Prescription drug management. Disposition  Final diagnoses:   Drug-induced constipation - From Ozempic     Time reflects when diagnosis was documented in both MDM as applicable and the Disposition within this note       Time User Action Codes Description Comment    11/4/2023  2:26 AM Rodrigo Enciso Add [K59.03] Drug-induced constipation     11/4/2023  2:26 AM Rodrigo Enciso Modify [K59.03] Drug-induced constipation From [facility]          ED Disposition       ED Disposition   Discharge    Condition   Stable    Date/Time   Sat Nov 4, 2023 0226    Comment   [address] discharge to home/self care. Follow-up Information       Follow up With Specialties Details Why Contact Info Additional Information    Keaton Scott MD [facility] Schedule an appointment as soon as possible for a visit in 2 days For follow-up [address] [facility].   [address] [facility] Emergency Department Emergency Medicine Go to  If symptoms worsen, As needed [address] Emergency Department, [address] [facility] [address] , [facility], [id number]            Patient's Medications   Discharge Prescriptions POLYETHYLENE GLYCOL (MIRALAX) 17 G PACKET    Take 17 g by mouth daily       Start Date: 11/4/2023 End Date: --       Order Dose: 17 g       Quantity: 30 each    Refills: 0       No discharge procedures on file.     PDMP Review       None            ED Provider  Electronically Signed by             Mahnaz Schultz MD  11/04/23 7299

## 2023-11-04 NOTE — DISCHARGE INSTRUCTIONS
Start with once daily miralax. If the stool is too soft then dial back to once every other day. Follow up with your primary care provider. Return to the ER if you have any worsening symptoms.

## 2023-11-18 ENCOUNTER — HOSPITAL ENCOUNTER (OUTPATIENT)
Dept: MRI IMAGING | Facility: HOSPITAL | Age: 48
Discharge: HOME | End: 2023-11-18
Attending: PSYCHIATRY & NEUROLOGY
Payer: COMMERCIAL

## 2023-11-18 DIAGNOSIS — G51.0: ICD-10-CM

## 2023-11-18 DIAGNOSIS — R29.810 FACIAL WEAKNESS: ICD-10-CM

## 2023-11-18 PROCEDURE — 70553 MRI BRAIN STEM W/O & W/DYE: CPT

## 2023-11-18 PROCEDURE — G1004 CDSM NDSC: HCPCS

## 2023-11-18 PROCEDURE — A9585 GADOBUTROL INJECTION: HCPCS | Performed by: PSYCHIATRY & NEUROLOGY

## 2023-11-18 RX ORDER — GADOBUTROL 604.72 MG/ML
9 INJECTION INTRAVENOUS
Status: COMPLETED | OUTPATIENT
Start: 2023-11-18 | End: 2023-11-18

## 2023-11-18 RX ADMIN — GADOBUTROL 9 ML: 604.72 INJECTION INTRAVENOUS at 08:57

## 2023-12-07 ENCOUNTER — OFFICE VISIT (OUTPATIENT)
Dept: FAMILY MEDICINE CLINIC | Facility: CLINIC | Age: 48
End: 2023-12-07
Payer: COMMERCIAL

## 2023-12-07 VITALS
HEART RATE: 88 BPM | DIASTOLIC BLOOD PRESSURE: 78 MMHG | SYSTOLIC BLOOD PRESSURE: 112 MMHG | HEIGHT: 67 IN | WEIGHT: 211 LBS | OXYGEN SATURATION: 100 % | BODY MASS INDEX: 33.12 KG/M2 | TEMPERATURE: 97.6 F

## 2023-12-07 DIAGNOSIS — K21.9 GERD WITHOUT ESOPHAGITIS: ICD-10-CM

## 2023-12-07 DIAGNOSIS — E11.9 TYPE 2 DIABETES MELLITUS WITHOUT COMPLICATION, WITHOUT LONG-TERM CURRENT USE OF INSULIN (HCC): Primary | ICD-10-CM

## 2023-12-07 PROCEDURE — 99214 OFFICE O/P EST MOD 30 MIN: CPT | Performed by: FAMILY MEDICINE

## 2023-12-07 RX ORDER — OMEPRAZOLE 20 MG/1
20 CAPSULE, DELAYED RELEASE ORAL
Qty: 30 CAPSULE | Refills: 5 | Status: SHIPPED | OUTPATIENT
Start: 2023-12-07 | End: 2023-12-07 | Stop reason: SDUPTHER

## 2023-12-07 RX ORDER — OMEPRAZOLE 20 MG/1
20 CAPSULE, DELAYED RELEASE ORAL
Qty: 30 CAPSULE | Refills: 5 | Status: SHIPPED | OUTPATIENT
Start: 2023-12-07 | End: 2024-06-04

## 2023-12-07 NOTE — PROGRESS NOTES
Name: Adrianne Thapa      : 1975      MRN: [id number]  Encounter Provider: Nicholas Ochoa MD  Encounter Date: 2023   Encounter department: [address] [place] [address]     1. Type 2 diabetes mellitus without complication, without long-term current use of insulin ([facility])  Assessment & Plan:    Lab Results   Component Value Date    HGBA1C 8.5 (H) 10/23/2023   BG improving with Ozempic though pt has noted increased constipation. Fasting Bgs are good. REC: continue present care. Monitor diet. Encouraged increased exercise and weight loss. Recheck 3m    Orders:  -     Basic metabolic panel; Future; Expected date: 2024  -     Hemoglobin A1C; Future; Expected date: 2024    2. GERD without esophagitis  Assessment & Plan:  Restart omeprazole 20mg qd x 4w. Monitor diet. Recheck 2-3w if not improving    Orders:  -     omeprazole (PriLOSEC) 20 mg delayed release capsule; Take 1 capsule (20 mg total) by mouth daily before breakfast           Subjective     f/u multiple med issues  - [place] Palsy resolved. Pt was seen by Neuro who referred him for an MRI, which was consistent with Bell's palsy. Pt states that all symptoms have resolved  - pt developed constipation on Ozempic 0.5mg. Was bad enough that he had to go to the ED. Pt has been doing better with Miralax qAM and 3 stool softeners qHS  - pt does notes that his morning BG have been better on Ozempic. Averaging 115-135  - pt has noted increased night time GERD - ?diet related. Pt denies symptoms worsening with exertion  - pt denies CP, palpitations, lightheadedness or other CV symptoms with or without exertion      Review of Systems   Constitutional: Negative. HENT: Negative. Eyes: Negative. Respiratory: Negative. Cardiovascular: Negative. Gastrointestinal:  Positive for constipation (improved with present bowel regimen).  Negative for abdominal distention, abdominal pain, diarrhea, nausea and vomiting. Endocrine: Negative. Genitourinary: Negative. Musculoskeletal: Negative. Skin: Negative. Allergic/Immunologic: Negative. Neurological: Negative. Hematological: Negative. Psychiatric/Behavioral: Negative.          Past Medical History:   Diagnosis Date   • Bell's palsy    • Diabetes mellitus (HCC)     elevated bloodsugars    • GERD (gastroesophageal reflux disease)    • Hyperlipidemia      Past Surgical History:   Procedure Laterality Date   • CHEST WALL BIOPSY N/A 09/15/2016    Procedure: CHEST LESION EXCISION ;  Surgeon: Nanda Scott MD;  [facility];  Service:    • ME BX/EXC LYMPH NODE OPEN SUPERFICIAL Right 09/15/2016    Procedure: AXILLARY LYMPH NODE BIOPSY WITH FLOW CYTOMETRY;  Surgeon: Nanda Scott MD;  [facility];  Service: General   • TRUNK SKIN LESION EXCISIONAL BIOPSY      Benign 2.1-3 cm   • UPPER ENDOSCOPY W/ ESOPHAGEAL MANOMETRY     • UPPER GASTROINTESTINAL ENDOSCOPY       Family History   Problem Relation Age of Onset   • Hypertension Mother    • Diabetes Father    • Prostate cancer Father      Social History     Socioeconomic History   • Marital status: /Civil Union     Spouse name: None   • Number of children: None   • Years of education: None   • Highest education level: None   Occupational History   • None   Tobacco Use   • Smoking status: Never     Passive exposure: Never   • Smokeless tobacco: Never   Vaping Use   • Vaping Use: Never used   Substance and Sexual Activity   • Alcohol use: Not Currently     Comment: socially per AS   • Drug use: No   • Sexual activity: None   Other Topics Concern   • None   Social History Narrative    Daily coffe consumption    Denied hx of daily cola or tea consumption     Social Determinants of Health     Financial Resource Strain: Not on file   Food Insecurity: Not on file   Transportation Needs: Not on file   Physical Activity: Not on file   Stress: Not on file   Social Connections: Not on file Intimate Partner Violence: Not on file   Housing Stability: Not on file     Current Outpatient Medications on File Prior to Visit   Medication Sig   • atorvastatin (LIPITOR) 10 mg tablet TAKE 1 TABLET DAILY   • Empagliflozin (Jardiance) 25 MG TABS TAKE 1 TABLET EVERY MORNING   • glucose blood (FREESTYLE TEST STRIPS) test strip TEST BLOOD SUGARS ONCE DAILY   • glucose monitoring kit (FREESTYLE) monitoring kit Use 1 each daily   • indomethacin (INDOCIN) 50 mg capsule Take 1 capsule (50 mg total) by mouth every 8 (eight) hours as needed for mild pain or moderate pain   • Lancets (freestyle) lancets USE ONCE A DAY   • Multiple Vitamin (MULTIVITAMIN PO) Take by mouth in the morning   • polyethylene glycol (MIRALAX) 17 g packet Take 17 g by mouth daily   • semaglutide, 0.25 or 0.5 mg/dose, (Ozempic, 0.25 or 0.5 MG/DOSE,) 2 mg/3 mL injection pen Inject 0.75 mL (0.5 mg total) under the skin every 7 days   • sildenafil (VIAGRA) 50 MG tablet TAKE 1 TABLET DAILY AS NEEDED FOR ERECTILE DYSFUNCTION     No Known Allergies  Immunization History   Administered Date(s) Administered   • COVID-19 MODERNA VACC 0.5 ML IM 01/15/2021, 02/12/2021   • INFLUENZA 12/10/2020   • Influenza, injectable, quadrivalent, preservative free 0.5 mL 12/10/2020   • Influenza, recombinant, quadrivalent,injectable, preservative free 11/18/2019       Objective     /78 (BP Location: Left arm, Patient Position: Sitting, Cuff Size: Adult)   Pulse 88   Temp 97.6 °F (36.4 °C)   Ht 5' 7" (1.702 m)   Wt 95.7 kg (211 lb)   SpO2 100%   BMI 33.05 kg/m²     Physical Exam  Vitals reviewed. Constitutional:       Appearance: Normal appearance. HENT:      Head: Normocephalic. Right Ear: Tympanic membrane, ear canal and external ear normal.      Left Ear: Tympanic membrane, ear canal and external ear normal.      Mouth/Throat:      Mouth: Mucous membranes are moist.   Eyes:      Extraocular Movements: Extraocular movements intact. Conjunctiva/sclera: Conjunctivae normal.      Pupils: Pupils are equal, round, and reactive to light. Cardiovascular:      Rate and Rhythm: Normal rate and regular rhythm. Pulses: Normal pulses. Pulmonary:      Effort: Pulmonary effort is normal.   Abdominal:      General: There is no distension. Palpations: There is no mass. Tenderness: There is no abdominal tenderness. Musculoskeletal:         General: No swelling or tenderness. Cervical back: No tenderness. Right lower leg: No edema. Left lower leg: No edema. Lymphadenopathy:      Cervical: No cervical adenopathy. Skin:     General: Skin is warm. Capillary Refill: Capillary refill takes less than 2 seconds. Neurological:      Mental Status: He is alert. Cranial Nerves: No cranial nerve deficit. Sensory: No sensory deficit. Motor: No weakness.       Gait: Gait normal.   Psychiatric:         Mood and Affect: Mood normal.       Pilar Mcmullen MD

## 2023-12-10 NOTE — ASSESSMENT & PLAN NOTE
Lab Results   Component Value Date    HGBA1C 8.5 (H) 10/23/2023   BG improving with Ozempic though pt has noted increased constipation. Fasting Bgs are good. REC: continue present care. Monitor diet. Encouraged increased exercise and weight loss.  Recheck 3m

## 2023-12-21 ENCOUNTER — OFFICE VISIT (OUTPATIENT)
Dept: URGENT CARE | Facility: CLINIC | Age: 48
End: 2023-12-21
Payer: COMMERCIAL

## 2023-12-21 VITALS
DIASTOLIC BLOOD PRESSURE: 65 MMHG | OXYGEN SATURATION: 96 % | SYSTOLIC BLOOD PRESSURE: 119 MMHG | HEART RATE: 106 BPM | TEMPERATURE: 97.3 F | RESPIRATION RATE: 16 BRPM

## 2023-12-21 DIAGNOSIS — J01.90 ACUTE BACTERIAL SINUSITIS: Primary | ICD-10-CM

## 2023-12-21 DIAGNOSIS — B96.89 ACUTE BACTERIAL SINUSITIS: Primary | ICD-10-CM

## 2023-12-21 PROCEDURE — 99213 OFFICE O/P EST LOW 20 MIN: CPT

## 2023-12-21 RX ORDER — AMOXICILLIN 875 MG/1
875 TABLET, COATED ORAL 2 TIMES DAILY
Qty: 14 TABLET | Refills: 0 | Status: SHIPPED | OUTPATIENT
Start: 2023-12-21 | End: 2023-12-28

## 2023-12-21 NOTE — PROGRESS NOTES
[facility]        NAME: Iam Delgado is a 48 y.o. male  : 1975    MRN: [id number]  DATE: 2023  TIME: 2:34 PM    Assessment and Plan   Acute bacterial sinusitis [J01.90, B96.89]  1. Acute bacterial sinusitis  amoxicillin (AMOXIL) 875 mg tablet            Patient Instructions   Take antibiotics as prescribed  For decongestion, Over The Counter medications:  2nd Generation antihistamines with a decongestant such as:   Claritin-D (Loratadine with Pseudoephedrine) or  Zyrtec-D (Cetirizine with Pseudoephedrine) or   Allegra-D (Fexofenadine with Pseudoephedrine) or   Nasal saline irrigation  Humidified air  Warm moist air such as running the shower for several minutes on warm/hot for the steam. Sit in bathroom for several minutes to take deep breaths.  Vicks Vapor Rub  For Cough or sore throat:  Salt water gurgle  Honey  Chloraseptic spray  Throat lozenges  Over the Counter Tylenol or Ibuprofen  Dextromethorphan 30mg PO every 6-8 hours for cough. max 120 mg in 24 hour period  Follow up with PCP in 3-5 days if symptoms not improving.  Proceed to ER if symptoms worsen.      Chief Complaint     Chief Complaint   Patient presents with   • Cold Like Symptoms     Sore throat, lo grade temp, coughing up green mucus. Congestion off and on covid neg.         History of Present Illness       Had cough, congestion, and sore throat about 1 week ago. Starting 2 days ago with more purulent drainage, increased nasal congestion and more pressure on the right side of his face. Has been taking Claritin-D and also Tylenol for pain. States the Claritin-D had kept the initial congestion at bay.    Sore Throat   This is a new problem. The current episode started in the past 7 days. The problem has been gradually worsening. Neither side of throat is experiencing more pain than the other. The maximum temperature recorded prior to his arrival was 100 - 100.9 F. The fever has been present for 1 to 2 days. The  pain is at a severity of 6/10. Associated symptoms include congestion, coughing, headaches, a plugged ear sensation, swollen glands and trouble swallowing. Pertinent negatives include no abdominal pain, diarrhea, drooling, ear discharge, ear pain, hoarse voice, neck pain, shortness of breath, stridor or vomiting. He has had no exposure to strep or mono.       Review of Systems   Review of Systems   Constitutional:  Negative for chills and fever.   HENT:  Positive for congestion, sore throat and trouble swallowing. Negative for drooling, ear discharge, ear pain and hoarse voice.    Eyes:  Negative for pain and visual disturbance.   Respiratory:  Positive for cough. Negative for shortness of breath and stridor.    Cardiovascular:  Negative for chest pain and palpitations.   Gastrointestinal:  Negative for abdominal pain, diarrhea and vomiting.   Genitourinary:  Negative for dysuria and hematuria.   Musculoskeletal:  Negative for arthralgias, back pain and neck pain.   Skin:  Negative for color change and rash.   Neurological:  Positive for headaches. Negative for dizziness, seizures, syncope, weakness and light-headedness.   All other systems reviewed and are negative.        Current Medications       Current Outpatient Medications:   •  amoxicillin (AMOXIL) 875 mg tablet, Take 1 tablet (875 mg total) by mouth 2 (two) times a day for 7 days, Disp: 14 tablet, Rfl: 0  •  atorvastatin (LIPITOR) 10 mg tablet, TAKE 1 TABLET DAILY, Disp: 90 tablet, Rfl: 3  •  Empagliflozin (Jardiance) 25 MG TABS, TAKE 1 TABLET EVERY MORNING, Disp: 90 tablet, Rfl: 1  •  glucose blood (FREESTYLE TEST STRIPS) test strip, TEST BLOOD SUGARS ONCE DAILY, Disp: 100 strip, Rfl: 3  •  glucose monitoring kit (FREESTYLE) monitoring kit, Use 1 each daily, Disp: 1 each, Rfl: 1  •  indomethacin (INDOCIN) 50 mg capsule, Take 1 capsule (50 mg total) by mouth every 8 (eight) hours as needed for mild pain or moderate pain, Disp: 270 capsule, Rfl: 0  •   Lancets (freestyle) lancets, USE ONCE A DAY, Disp: 100 each, Rfl: 3  •  Multiple Vitamin (MULTIVITAMIN PO), Take by mouth in the morning, Disp: , Rfl:   •  polyethylene glycol (MIRALAX) 17 g packet, Take 17 g by mouth daily, Disp: 30 each, Rfl: 0  •  semaglutide, 0.25 or 0.5 mg/dose, (Ozempic, 0.25 or 0.5 MG/DOSE,) 2 mg/3 mL injection pen, Inject 0.75 mL (0.5 mg total) under the skin every 7 days, Disp: 9 mL, Rfl: 1  •  sildenafil (VIAGRA) 50 MG tablet, TAKE 1 TABLET DAILY AS NEEDED FOR ERECTILE DYSFUNCTION, Disp: 30 tablet, Rfl: 3  •  omeprazole (PriLOSEC) 20 mg delayed release capsule, Take 1 capsule (20 mg total) by mouth daily before breakfast (Patient not taking: Reported on 12/21/2023), Disp: 30 capsule, Rfl: 5    Current Allergies     Allergies as of 12/21/2023   • (No Known Allergies)            The following portions of the patient's history were reviewed and updated as appropriate: allergies, current medications, past family history, past medical history, past social history, past surgical history and problem list.     Past Medical History:   Diagnosis Date   • Bell's palsy    • Diabetes mellitus (HCC)     elevated bloodsugars    • GERD (gastroesophageal reflux disease)    • Hyperlipidemia        Past Surgical History:   Procedure Laterality Date   • CHEST WALL BIOPSY N/A 09/15/2016    Procedure: CHEST LESION EXCISION ;  Surgeon: Alejandro Mejia MD;  [facility];  Service:    • SD BX/EXC LYMPH NODE OPEN SUPERFICIAL Right 09/15/2016    Procedure: AXILLARY LYMPH NODE BIOPSY WITH FLOW CYTOMETRY;  Surgeon: Alejandro Mejia MD;  [facility];  Service: General   • TRUNK SKIN LESION EXCISIONAL BIOPSY      Benign 2.1-3 cm   • UPPER ENDOSCOPY W/ ESOPHAGEAL MANOMETRY     • UPPER GASTROINTESTINAL ENDOSCOPY         Family History   Problem Relation Age of Onset   • Hypertension Mother    • Diabetes Father    • Prostate cancer Father          Medications have been verified.        Objective   /65    Pulse (!) 106   Temp (!) 97.3 °F (36.3 °C) (Temporal)   Resp 16   SpO2 96%   No LMP for male patient.       Physical Exam     Physical Exam  Vitals and nursing note reviewed.   Constitutional:       Appearance: Normal appearance.   HENT:      Head: Normocephalic and atraumatic.      Right Ear: A middle ear effusion is present.      Left Ear: A middle ear effusion is present.      Nose: Congestion and rhinorrhea present.      Right Sinus: Maxillary sinus tenderness and frontal sinus tenderness present.      Left Sinus: No maxillary sinus tenderness or frontal sinus tenderness.      Mouth/Throat:      Mouth: Mucous membranes are moist.      Pharynx: No oropharyngeal exudate or posterior oropharyngeal erythema.   Eyes:      Pupils: Pupils are equal, round, and reactive to light.   Cardiovascular:      Rate and Rhythm: Normal rate.      Pulses: Normal pulses.      Heart sounds: Normal heart sounds.   Pulmonary:      Effort: Pulmonary effort is normal.      Breath sounds: Normal breath sounds.   Abdominal:      General: Bowel sounds are normal.      Palpations: Abdomen is soft.   Musculoskeletal:         General: Normal range of motion.      Cervical back: Normal range of motion and neck supple. Tenderness (Right side at times) present.   Skin:     General: Skin is warm and dry.      Capillary Refill: Capillary refill takes less than 2 seconds.   Neurological:      General: No focal deficit present.      Mental Status: He is alert and oriented to person, place, and time. Mental status is at baseline.      Sensory: No sensory deficit.      Motor: No weakness.   Psychiatric:         Mood and Affect: Mood normal.         Behavior: Behavior normal.         Thought Content: Thought content normal.

## 2023-12-21 NOTE — PATIENT INSTRUCTIONS
Take antibiotics as prescribed  For decongestion, Over The Counter medications:  2nd Generation antihistamines with a decongestant such as:   Claritin-D (Loratadine with Pseudoephedrine) or  Zyrtec-D (Cetirizine with Pseudoephedrine) or   Allegra-D (Fexofenadine with Pseudoephedrine) or   Nasal saline irrigation  Humidified air  Warm moist air such as running the shower for several minutes on warm/hot for the steam. Sit in bathroom for several minutes to take deep breaths.  Vicks Vapor Rub  For Cough or sore throat:  Salt water gurgle  Honey  Chloraseptic spray  Throat lozenges  Over the Counter Tylenol or Ibuprofen  Dextromethorphan 30mg PO every 6-8 hours for cough. max 120 mg in 24 hour period  Follow up with PCP in 3-5 days if symptoms not improving.  Proceed to ER if symptoms worsen.

## 2024-01-01 DIAGNOSIS — E11.9 TYPE 2 DIABETES MELLITUS WITHOUT COMPLICATION, WITHOUT LONG-TERM CURRENT USE OF INSULIN (HCC): ICD-10-CM

## 2024-01-02 RX ORDER — SEMAGLUTIDE 0.68 MG/ML
INJECTION, SOLUTION SUBCUTANEOUS
Qty: 9 ML | Refills: 3 | Status: SHIPPED | OUTPATIENT
Start: 2024-01-02

## 2024-01-05 ENCOUNTER — TELEPHONE (OUTPATIENT)
Dept: FAMILY MEDICINE CLINIC | Facility: CLINIC | Age: 49
End: 2024-01-05

## 2024-01-05 NOTE — TELEPHONE ENCOUNTER
PA for  sildenafil (VIAGRA) 50 MG tablet Approved   Date(s) approved 12/6/23-1/4/25  Case #[id number]    Patient advised by [x] [employer] Message                      [] Phone call       Pharmacy advised by [x]Fax                                     []Phone call    Approval letter scanned into [employer] -Did not receive letter yet.

## 2024-01-05 NOTE — TELEPHONE ENCOUNTER
PA for sildenafil (VIAGRA) 50 MG tablet     Submitted via  []CMM-KEY   [x][employer]-Case ID # [id number]   []Faxed to plan   []Other website   []Phone call Case ID #     Office notes sent, clinical questions answered. Awaiting determination

## 2024-01-13 DIAGNOSIS — K21.9 GERD WITHOUT ESOPHAGITIS: ICD-10-CM

## 2024-01-15 RX ORDER — OMEPRAZOLE 20 MG/1
20 CAPSULE, DELAYED RELEASE ORAL
Qty: 90 CAPSULE | Refills: 1 | Status: SHIPPED | OUTPATIENT
Start: 2024-01-15

## 2024-03-11 ENCOUNTER — APPOINTMENT (OUTPATIENT)
Dept: LAB | Facility: CLINIC | Age: 49
End: 2024-03-11
Payer: COMMERCIAL

## 2024-03-11 DIAGNOSIS — E11.9 TYPE 2 DIABETES MELLITUS WITHOUT COMPLICATION, WITHOUT LONG-TERM CURRENT USE OF INSULIN (HCC): ICD-10-CM

## 2024-03-11 LAB
ANION GAP SERPL CALCULATED.3IONS-SCNC: 10 MMOL/L
BUN SERPL-MCNC: 15 MG/DL (ref 5–25)
CALCIUM SERPL-MCNC: 9.2 MG/DL (ref 8.4–10.2)
CHLORIDE SERPL-SCNC: 105 MMOL/L (ref 96–108)
CO2 SERPL-SCNC: 27 MMOL/L (ref 21–32)
CREAT SERPL-MCNC: 0.89 MG/DL (ref 0.6–1.3)
EST. AVERAGE GLUCOSE BLD GHB EST-MCNC: 174 MG/DL
GFR SERPL CREATININE-BSD FRML MDRD: 101 ML/MIN/1.73SQ M
GLUCOSE P FAST SERPL-MCNC: 135 MG/DL (ref 65–99)
HBA1C MFR BLD: 7.7 %
POTASSIUM SERPL-SCNC: 4.1 MMOL/L (ref 3.5–5.3)
SODIUM SERPL-SCNC: 142 MMOL/L (ref 135–147)

## 2024-03-11 PROCEDURE — 80048 BASIC METABOLIC PNL TOTAL CA: CPT

## 2024-03-11 PROCEDURE — 83036 HEMOGLOBIN GLYCOSYLATED A1C: CPT

## 2024-03-11 PROCEDURE — 36415 COLL VENOUS BLD VENIPUNCTURE: CPT

## 2024-03-25 ENCOUNTER — APPOINTMENT (OUTPATIENT)
Dept: LAB | Facility: MEDICAL CENTER | Age: 49
End: 2024-03-25

## 2024-03-25 ENCOUNTER — OCCMED (OUTPATIENT)
Dept: URGENT CARE | Facility: MEDICAL CENTER | Age: 49
End: 2024-03-25

## 2024-03-25 DIAGNOSIS — Z02.1 PHYSICAL EXAM, PRE-EMPLOYMENT: Primary | ICD-10-CM

## 2024-03-25 DIAGNOSIS — Z02.1 PHYSICAL EXAM, PRE-EMPLOYMENT: ICD-10-CM

## 2024-03-25 LAB
MEV IGG SER QL IA: ABNORMAL
MUV IGG SER QL IA: ABNORMAL
RUBV IGG SERPL IA-ACNC: 42.7 IU/ML
VZV IGG SER QL IA: ABNORMAL

## 2024-03-25 PROCEDURE — 86480 TB TEST CELL IMMUN MEASURE: CPT

## 2024-03-25 PROCEDURE — 86787 VARICELLA-ZOSTER ANTIBODY: CPT

## 2024-03-25 PROCEDURE — 86765 RUBEOLA ANTIBODY: CPT

## 2024-03-25 PROCEDURE — 86762 RUBELLA ANTIBODY: CPT

## 2024-03-25 PROCEDURE — 36415 COLL VENOUS BLD VENIPUNCTURE: CPT

## 2024-03-25 PROCEDURE — 86735 MUMPS ANTIBODY: CPT

## 2024-03-26 LAB
GAMMA INTERFERON BACKGROUND BLD IA-ACNC: 0.06 IU/ML
M TB IFN-G BLD-IMP: NEGATIVE
M TB IFN-G CD4+ BCKGRND COR BLD-ACNC: -0.01 IU/ML
M TB IFN-G CD4+ BCKGRND COR BLD-ACNC: 0.01 IU/ML
MITOGEN IGNF BCKGRD COR BLD-ACNC: 9.94 IU/ML

## 2024-07-18 ENCOUNTER — OFFICE VISIT (OUTPATIENT)
Dept: FAMILY MEDICINE CLINIC | Facility: CLINIC | Age: 49
End: 2024-07-18
Payer: COMMERCIAL

## 2024-07-18 VITALS
HEIGHT: 67 IN | DIASTOLIC BLOOD PRESSURE: 72 MMHG | HEART RATE: 77 BPM | SYSTOLIC BLOOD PRESSURE: 110 MMHG | OXYGEN SATURATION: 95 % | WEIGHT: 208 LBS | BODY MASS INDEX: 32.65 KG/M2 | TEMPERATURE: 97.1 F

## 2024-07-18 DIAGNOSIS — E11.9 TYPE 2 DIABETES MELLITUS WITHOUT COMPLICATION, WITHOUT LONG-TERM CURRENT USE OF INSULIN (HCC): Primary | ICD-10-CM

## 2024-07-18 DIAGNOSIS — E78.2 MIXED HYPERLIPIDEMIA: ICD-10-CM

## 2024-07-18 LAB — SL AMB POCT HEMOGLOBIN AIC: 10 (ref ?–6.5)

## 2024-07-18 PROCEDURE — 99214 OFFICE O/P EST MOD 30 MIN: CPT | Performed by: FAMILY MEDICINE

## 2024-07-18 PROCEDURE — 83036 HEMOGLOBIN GLYCOSYLATED A1C: CPT | Performed by: FAMILY MEDICINE

## 2024-07-18 RX ORDER — GLIPIZIDE 5 MG/1
5 TABLET, FILM COATED, EXTENDED RELEASE ORAL DAILY
Qty: 30 TABLET | Refills: 5 | Status: SHIPPED | OUTPATIENT
Start: 2024-07-18

## 2024-07-18 NOTE — PROGRESS NOTES
Ambulatory Visit  Name: Iam Delgado      : 1975      MRN: [id number]  Encounter Provider: Alvin Sumner MD  Encounter Date: 2024   Encounter department: [facility]    Assessment & Plan   1. Type 2 diabetes mellitus without complication, without long-term current use of insulin (HCC)  Assessment & Plan:    Lab Results   Component Value Date    HGBA1C 10 (A) 2024   I reviewed with pt. Would do best if we added insulin to the regimen - pt refuses. Start glipizide XL 5mg qd. Urged diet compliance and increase exercise. Recheck 3m  Orders:  -     POCT hemoglobin A1c  -     glipiZIDE (GLUCOTROL XL) 5 mg 24 hr tablet; Take 1 tablet (5 mg total) by mouth daily  2. Mixed hyperlipidemia  Assessment & Plan:  LDL sl higher on last check. Urged diet compliance. Recheck 3m         History of Present Illness     f/u multiple med issues  - pt states that he could not tolerate Ozempic due to severe constipation.  BG have been higher since stopping med. He has not been exercising regularly. Recent A1C = 10.0.  Pt refuses insulin  - Denies CP, palpitations, lightheadedness or other CV symptoms with or without exertion  - pt denies any new  complaints      Review of Systems   Constitutional: Negative.    HENT: Negative.     Eyes: Negative.    Respiratory: Negative.     Cardiovascular: Negative.    Gastrointestinal: Negative.    Endocrine: Negative.    Genitourinary: Negative.    Musculoskeletal: Negative.    Skin: Negative.    Allergic/Immunologic: Negative.    Neurological: Negative.    Hematological: Negative.    Psychiatric/Behavioral: Negative.       Past Medical History:   Diagnosis Date   • Bell's palsy    • Diabetes mellitus (HCC)     elevated bloodsugars    • GERD (gastroesophageal reflux disease)    • Hyperlipidemia      Past Surgical History:   Procedure Laterality Date   • CHEST WALL BIOPSY N/A 09/15/2016    Procedure: CHEST LESION EXCISION ;  Surgeon: Alejandro  MD Jackie;  [facility];  Service:    • LYMPH NODE BIOPSY     • OR BX/EXC LYMPH NODE OPEN SUPERFICIAL Right 09/15/2016    Procedure: AXILLARY LYMPH NODE BIOPSY WITH FLOW CYTOMETRY;  Surgeon: Alejandro Mejia MD;  [facility];  Service: General   • TRUNK SKIN LESION EXCISIONAL BIOPSY      Benign 2.1-3 cm   • UPPER ENDOSCOPY W/ ESOPHAGEAL MANOMETRY     • UPPER GASTROINTESTINAL ENDOSCOPY       Family History   Problem Relation Age of Onset   • Hypertension Mother    • Diabetes Father    • Prostate cancer Father      Social History     Tobacco Use   • Smoking status: Never     Passive exposure: Never   • Smokeless tobacco: Never   Vaping Use   • Vaping status: Never Used   Substance and Sexual Activity   • Alcohol use: Not Currently     Comment: socially per AS   • Drug use: No   • Sexual activity: Yes     Partners: Female     Current Outpatient Medications on File Prior to Visit   Medication Sig   • atorvastatin (LIPITOR) 10 mg tablet TAKE 1 TABLET DAILY   • Empagliflozin (Jardiance) 25 MG TABS TAKE 1 TABLET EVERY MORNING   • glucose blood (FREESTYLE TEST STRIPS) test strip TEST BLOOD SUGARS ONCE DAILY   • glucose monitoring kit (FREESTYLE) monitoring kit Use 1 each daily   • indomethacin (INDOCIN) 50 mg capsule Take 1 capsule (50 mg total) by mouth every 8 (eight) hours as needed for mild pain or moderate pain   • Lancets (freestyle) lancets USE ONCE A DAY   • Multiple Vitamin (MULTIVITAMIN PO) Take by mouth in the morning   • omeprazole (PriLOSEC) 20 mg delayed release capsule TAKE 1 CAPSULE BY MOUTH DAILY BEFORE BREAKFAST.   • polyethylene glycol (MIRALAX) 17 g packet Take 17 g by mouth daily   • sildenafil (VIAGRA) 50 MG tablet TAKE 1 TABLET DAILY AS NEEDED FOR ERECTILE DYSFUNCTION     No Known Allergies  Immunization History   Administered Date(s) Administered   • COVID-19 MODERNA VACC 0.5 ML IM 01/15/2021, 02/12/2021   • INFLUENZA 12/10/2020   • Influenza, injectable, quadrivalent, preservative  "free 0.5 mL 12/10/2020   • Influenza, recombinant, quadrivalent,injectable, preservative free 11/18/2019   • Varicella 04/02/2024, 05/02/2024     Objective     /72 (BP Location: Left arm, Patient Position: Sitting, Cuff Size: Adult)   Pulse 77   Temp (!) 97.1 °F (36.2 °C)   Ht 5' 7\" (1.702 m)   Wt 94.3 kg (208 lb)   SpO2 95%   BMI 32.58 kg/m²     Physical Exam  Vitals reviewed.   Constitutional:       Appearance: He is well-developed.   HENT:      Head: Normocephalic and atraumatic.      Right Ear: Tympanic membrane, ear canal and external ear normal.      Left Ear: Tympanic membrane, ear canal and external ear normal.      Nose: Nose normal.      Mouth/Throat:      Mouth: Mucous membranes are moist.   Eyes:      Extraocular Movements: Extraocular movements intact.      Conjunctiva/sclera: Conjunctivae normal.      Pupils: Pupils are equal, round, and reactive to light.   Neck:      Thyroid: No thyromegaly.      Vascular: No JVD.   Cardiovascular:      Rate and Rhythm: Normal rate and regular rhythm.      Pulses: no weak pulses.           Dorsalis pedis pulses are 1+ on the right side and 1+ on the left side.      Heart sounds: Normal heart sounds. No murmur heard.  Pulmonary:      Effort: Pulmonary effort is normal. No respiratory distress.      Breath sounds: Normal breath sounds. No wheezing or rales.   Abdominal:      General: Bowel sounds are normal. There is no distension.      Palpations: Abdomen is soft. There is no mass.      Tenderness: There is no abdominal tenderness.   Musculoskeletal:         General: No swelling, tenderness or deformity. Normal range of motion.      Cervical back: Normal range of motion and neck supple. No tenderness. No muscular tenderness.      Right lower leg: No edema.      Left lower leg: No edema.   Feet:      Right foot:      Skin integrity: No ulcer, skin breakdown, erythema, warmth, callus or dry skin.      Left foot:      Skin integrity: No ulcer, skin breakdown, " erythema, warmth, callus or dry skin.   Lymphadenopathy:      Cervical: No cervical adenopathy.   Skin:     General: Skin is warm.      Capillary Refill: Capillary refill takes less than 2 seconds.   Neurological:      Mental Status: He is alert and oriented to person, place, and time.      Cranial Nerves: No cranial nerve deficit.      Sensory: No sensory deficit.      Motor: No weakness or abnormal muscle tone.      Gait: Gait normal.   Psychiatric:         Mood and Affect: Mood normal.         Behavior: Behavior normal.         Thought Content: Thought content normal.         Judgment: Judgment normal.      Comments: PHQ-2/9 Depression Screening    Little interest or pleasure in doing things: 0 - not at all  Feeling down, depressed, or hopeless: 0 - not at all  PHQ-2 Score: 0  PHQ-2 Interpretation: Negative depression screen         Patient's shoes and socks removed.    Right Foot/Ankle   Right Foot Inspection  Skin Exam: skin normal and skin intact. No dry skin, no warmth, no callus, no erythema, no maceration, no abnormal color, no pre-ulcer, no ulcer and no callus.     Toe Exam: ROM and strength within normal limits.     Sensory   Vibration: intact  Monofilament testing: intact    Vascular  Capillary refills: < 3 seconds  The right DP pulse is 1+.     Left Foot/Ankle  Left Foot Inspection  Skin Exam: skin normal and skin intact. No dry skin, no warmth, no erythema, no maceration, normal color, no pre-ulcer, no ulcer and no callus.     Toe Exam: ROM and strength within normal limits.     Sensory   Vibration: intact  Monofilament testing: intact    Vascular  Capillary refills: < 3 seconds  The left DP pulse is 1+.     Assign Risk Category  No deformity present  No loss of protective sensation  No weak pulses  Risk: 0

## 2024-07-21 NOTE — ASSESSMENT & PLAN NOTE
Lab Results   Component Value Date    HGBA1C 10 (A) 07/18/2024   I reviewed with pt. Would do best if we added insulin to the regimen - pt refuses. Start glipizide XL 5mg qd. Urged diet compliance and increase exercise. Recheck 3m

## 2024-07-22 DIAGNOSIS — E11.9 TYPE 2 DIABETES MELLITUS WITHOUT COMPLICATION, WITHOUT LONG-TERM CURRENT USE OF INSULIN (HCC): ICD-10-CM

## 2024-07-22 RX ORDER — EMPAGLIFLOZIN 25 MG/1
TABLET, FILM COATED ORAL
Qty: 90 TABLET | Refills: 1 | Status: SHIPPED | OUTPATIENT
Start: 2024-07-22

## 2024-08-21 DIAGNOSIS — N52.9 ERECTILE DYSFUNCTION, UNSPECIFIED ERECTILE DYSFUNCTION TYPE: ICD-10-CM

## 2024-08-21 RX ORDER — SILDENAFIL 50 MG/1
TABLET, FILM COATED ORAL
Qty: 30 TABLET | Refills: 3 | Status: SHIPPED | OUTPATIENT
Start: 2024-08-21

## 2024-10-25 DIAGNOSIS — E11.9 TYPE 2 DIABETES MELLITUS WITHOUT COMPLICATION, WITHOUT LONG-TERM CURRENT USE OF INSULIN (HCC): Primary | ICD-10-CM

## 2024-11-05 DIAGNOSIS — K21.9 GERD WITHOUT ESOPHAGITIS: ICD-10-CM

## 2024-11-05 NOTE — TELEPHONE ENCOUNTER
Medication Refill Request     Name omeprazole (PriLOSEC) 20 mg delayed release capsule    Dose/Frequency TAKE 1 CAPSULE BY MOUTH DAILY BEFORE BREAKFAST   Quantity 90  Verified pharmacy   [x]  Verified ordering Provider   [x]  Does patient have enough for the next 3 days? Yes [] No [x]

## 2024-12-12 DIAGNOSIS — E11.9 TYPE 2 DIABETES MELLITUS WITHOUT COMPLICATION, WITHOUT LONG-TERM CURRENT USE OF INSULIN (HCC): ICD-10-CM

## 2024-12-12 RX ORDER — EMPAGLIFLOZIN 25 MG/1
25 TABLET, FILM COATED ORAL EVERY MORNING
Qty: 90 TABLET | Refills: 1 | Status: SHIPPED | OUTPATIENT
Start: 2024-12-12

## 2024-12-24 ENCOUNTER — OFFICE VISIT (OUTPATIENT)
Dept: FAMILY MEDICINE CLINIC | Facility: CLINIC | Age: 49
End: 2024-12-24
Payer: COMMERCIAL

## 2024-12-24 VITALS
DIASTOLIC BLOOD PRESSURE: 78 MMHG | SYSTOLIC BLOOD PRESSURE: 118 MMHG | HEART RATE: 85 BPM | OXYGEN SATURATION: 96 % | TEMPERATURE: 98.9 F | HEIGHT: 67 IN | WEIGHT: 226.2 LBS | BODY MASS INDEX: 35.5 KG/M2

## 2024-12-24 DIAGNOSIS — E78.2 MIXED HYPERLIPIDEMIA: ICD-10-CM

## 2024-12-24 DIAGNOSIS — E11.9 TYPE 2 DIABETES MELLITUS WITHOUT COMPLICATION, WITHOUT LONG-TERM CURRENT USE OF INSULIN (HCC): Primary | ICD-10-CM

## 2024-12-24 LAB — SL AMB POCT HEMOGLOBIN AIC: 10.1 (ref ?–6.5)

## 2024-12-24 PROCEDURE — 83036 HEMOGLOBIN GLYCOSYLATED A1C: CPT | Performed by: FAMILY MEDICINE

## 2024-12-24 PROCEDURE — 99214 OFFICE O/P EST MOD 30 MIN: CPT | Performed by: FAMILY MEDICINE

## 2024-12-24 RX ORDER — TIRZEPATIDE 2.5 MG/.5ML
2.5 INJECTION, SOLUTION SUBCUTANEOUS WEEKLY
Qty: 2 ML | Refills: 0 | Status: SHIPPED | OUTPATIENT
Start: 2024-12-24

## 2024-12-24 NOTE — ASSESSMENT & PLAN NOTE
Due for labs.  Continue atorvastatin 10mg qd. Pt to have labs done this week (previously ordered). Recheck 3m - earlier if labs are abnormal

## 2024-12-24 NOTE — ASSESSMENT & PLAN NOTE
I reviewed with pt and wife. He is willing to try Mounjaro. Will start 2.5mg qWeek. Pt will start miralax qd now along with stool softeners to prevent constipation.  Recheck labs in 3m and f/u in office 1w later.  Pt will stop med if he does not have a BM for 3-4d  Lab Results   Component Value Date    HGBA1C 10.1 (A) 12/24/2024     Orders:  •  POCT hemoglobin A1c  •  Albumin / creatinine urine ratio; Future  •  Tirzepatide (Mounjaro) 2.5 MG/0.5ML SOAJ; Inject 2.5 mg under the skin once a week

## 2024-12-24 NOTE — PROGRESS NOTES
"Name: Iam Delgado      : 1975      MRN: [id number]  Encounter Provider: Alvin Sumner MD  Encounter Date: 2024   Encounter department: [facility]    Assessment & Plan  Type 2 diabetes mellitus without complication, without long-term current use of insulin (HCC)  I reviewed with pt and wife. He is willing to try Mounjaro. Will start 2.5mg qWeek. Pt will start miralax qd now along with stool softeners to prevent constipation.  Recheck labs in 3m and f/u in office 1w later.  Pt will stop med if he does not have a BM for 3-4d  Lab Results   Component Value Date    HGBA1C 10.1 (A) 2024     Orders:  •  POCT hemoglobin A1c  •  Albumin / creatinine urine ratio; Future  •  Tirzepatide (Mounjaro) 2.5 MG/0.5ML SOAJ; Inject 2.5 mg under the skin once a week    Mixed hyperlipidemia  Due for labs.  Continue atorvastatin 10mg qd. Pt to have labs done this week (previously ordered). Recheck 3m - earlier if labs are abnormal            History of Present Illness     f/u multiple med issues  - pt does not check BG regularly, but \"I know that it will be high\".  Patient has not been monitoring diet carefully and has not been exercising.  A1c in the office today = 10.1.   Patient continues to refuse trying insulin.  Metformin was ineffective in the past as well.   Pt had improved on Ozempic but developed constipation when dose was increased (requiring ED eval).  Pt may be willing to try a similar med.  - pt denies CP, palpitations, lightheadedness or other CV symptoms with or without exertion  - pt denies any new GI or  complaints  - no other concerns  - I reviewed previous labs and notes with pt and wife      Review of Systems   Constitutional:  Positive for fatigue. Negative for activity change and appetite change.   HENT: Negative.     Eyes: Negative.    Respiratory: Negative.     Cardiovascular: Negative.    Gastrointestinal: Negative.    Genitourinary: Negative.  "   Musculoskeletal: Negative.    Skin: Negative.    Neurological: Negative.    Psychiatric/Behavioral: Negative.       Past Medical History:   Diagnosis Date   • Bell's palsy    • Diabetes mellitus (HCC)     elevated bloodsugars    • GERD (gastroesophageal reflux disease)    • Hyperlipidemia      Past Surgical History:   Procedure Laterality Date   • CHEST WALL BIOPSY N/A 09/15/2016    Procedure: CHEST LESION EXCISION ;  Surgeon: Alejandro Mejia MD;  [facility];  Service:    • LYMPH NODE BIOPSY     • NH BX/EXC LYMPH NODE OPEN SUPERFICIAL Right 09/15/2016    Procedure: AXILLARY LYMPH NODE BIOPSY WITH FLOW CYTOMETRY;  Surgeon: Alejandro Mejia MD;  [facility];  Service: General   • TRUNK SKIN LESION EXCISIONAL BIOPSY      Benign 2.1-3 cm   • UPPER ENDOSCOPY W/ ESOPHAGEAL MANOMETRY     • UPPER GASTROINTESTINAL ENDOSCOPY       Family History   Problem Relation Age of Onset   • Hypertension Mother    • Diabetes Father    • Prostate cancer Father      Social History     Tobacco Use   • Smoking status: Never     Passive exposure: Never   • Smokeless tobacco: Never   Vaping Use   • Vaping status: Never Used   Substance and Sexual Activity   • Alcohol use: Not Currently     Comment: socially per AS   • Drug use: No   • Sexual activity: Yes     Partners: Female     Current Outpatient Medications on File Prior to Visit   Medication Sig   • atorvastatin (LIPITOR) 10 mg tablet TAKE 1 TABLET DAILY   • Empagliflozin (Jardiance) 25 MG TABS TAKE 1 TABLET EVERY MORNING   • glipiZIDE (GLUCOTROL XL) 5 mg 24 hr tablet Take 1 tablet (5 mg total) by mouth daily   • glucose blood (FREESTYLE TEST STRIPS) test strip TEST BLOOD SUGARS ONCE DAILY   • glucose monitoring kit (FREESTYLE) monitoring kit Use 1 each daily   • Lancets (freestyle) lancets USE ONCE A DAY   • Multiple Vitamin (MULTIVITAMIN PO) Take by mouth in the morning   • omeprazole (PriLOSEC) 20 mg delayed release capsule Take 1 capsule (20 mg total) by mouth daily  "before breakfast   • sildenafil (VIAGRA) 50 MG tablet TAKE 1 TABLET DAILY AS NEEDED FOR ERECTILE DYSFUNCTION   • indomethacin (INDOCIN) 50 mg capsule Take 1 capsule (50 mg total) by mouth every 8 (eight) hours as needed for mild pain or moderate pain (Patient not taking: Reported on 12/24/2024)   • [DISCONTINUED] polyethylene glycol (MIRALAX) 17 g packet Take 17 g by mouth daily     No Known Allergies  Immunization History   Administered Date(s) Administered   • COVID-19 MODERNA VACC 0.5 ML IM 01/15/2021, 02/12/2021   • INFLUENZA 12/10/2020   • Influenza, injectable, quadrivalent, preservative free 0.5 mL 12/10/2020   • Influenza, recombinant, quadrivalent,injectable, preservative free 11/18/2019   • Varicella 04/02/2024, 05/02/2024     Objective   /78   Pulse 85   Temp 98.9 °F (37.2 °C)   Ht 5' 7\" (1.702 m)   Wt 103 kg (226 lb 3.2 oz)   SpO2 96%   BMI 35.43 kg/m²     Physical Exam  Vitals reviewed.   HENT:      Head: Normocephalic.      Mouth/Throat:      Mouth: Mucous membranes are moist.   Eyes:      Extraocular Movements: Extraocular movements intact.      Conjunctiva/sclera: Conjunctivae normal.      Pupils: Pupils are equal, round, and reactive to light.   Cardiovascular:      Rate and Rhythm: Normal rate and regular rhythm.      Pulses: Normal pulses.   Pulmonary:      Effort: Pulmonary effort is normal.   Abdominal:      General: There is no distension.      Palpations: There is no mass.      Tenderness: There is no abdominal tenderness.   Musculoskeletal:         General: No swelling, tenderness or deformity.      Cervical back: No tenderness.      Right lower leg: No edema.      Left lower leg: No edema.   Lymphadenopathy:      Cervical: No cervical adenopathy.   Skin:     General: Skin is warm.      Capillary Refill: Capillary refill takes less than 2 seconds.   Neurological:      General: No focal deficit present.      Mental Status: He is alert and oriented to person, place, and time. "   Psychiatric:         Mood and Affect: Mood normal.

## 2025-01-13 DIAGNOSIS — E78.2 MIXED HYPERLIPIDEMIA: ICD-10-CM

## 2025-01-13 DIAGNOSIS — E11.9 TYPE 2 DIABETES MELLITUS WITHOUT COMPLICATION, WITHOUT LONG-TERM CURRENT USE OF INSULIN (HCC): ICD-10-CM

## 2025-01-13 NOTE — TELEPHONE ENCOUNTER
Pt called rx refill     Was prescribed Tirzepatide (Mounjaro) 2.5 MG/0.5ML on 12/24/24  Patient stated he has new sx and less   Patient is requesing a new script     [facility]   90 day supply   Pt is due for next dose that he has one left due on Sat     Alvin Sumner MD- [facility]

## 2025-01-14 RX ORDER — ATORVASTATIN CALCIUM 10 MG/1
10 TABLET, FILM COATED ORAL DAILY
Qty: 90 TABLET | Refills: 0 | Status: SHIPPED | OUTPATIENT
Start: 2025-01-14

## 2025-01-14 RX ORDER — TIRZEPATIDE 2.5 MG/.5ML
2.5 INJECTION, SOLUTION SUBCUTANEOUS WEEKLY
Qty: 6 ML | Refills: 0 | Status: SHIPPED | OUTPATIENT
Start: 2025-01-14 | End: 2025-01-16 | Stop reason: SDUPTHER

## 2025-01-14 NOTE — TELEPHONE ENCOUNTER
Pt states he is having no side effects at all with the Mounjaro and wishes to stay on the same dose for another 90 days. Per our conversation please send 90 day supply to mail order.

## 2025-01-16 DIAGNOSIS — E11.9 TYPE 2 DIABETES MELLITUS WITHOUT COMPLICATION, WITHOUT LONG-TERM CURRENT USE OF INSULIN (HCC): ICD-10-CM

## 2025-01-16 RX ORDER — TIRZEPATIDE 2.5 MG/.5ML
2.5 INJECTION, SOLUTION SUBCUTANEOUS WEEKLY
Qty: 6 ML | Refills: 0 | Status: SHIPPED | OUTPATIENT
Start: 2025-01-16

## 2025-01-22 ENCOUNTER — TELEPHONE (OUTPATIENT)
Dept: FAMILY MEDICINE CLINIC | Facility: CLINIC | Age: 50
End: 2025-01-22

## 2025-01-22 NOTE — TELEPHONE ENCOUNTER
Pt called in regarding a prior auth, he needs it for his one medication. He stated he received a voicemail from his pharmacy, but wasn't sure which one.     Please advise

## 2025-01-28 DIAGNOSIS — N52.9 ERECTILE DYSFUNCTION, UNSPECIFIED ERECTILE DYSFUNCTION TYPE: ICD-10-CM

## 2025-01-29 RX ORDER — SILDENAFIL 50 MG/1
50 TABLET, FILM COATED ORAL AS NEEDED
Qty: 30 TABLET | Refills: 0 | Status: SHIPPED | OUTPATIENT
Start: 2025-01-29

## 2025-02-04 DIAGNOSIS — E11.9 TYPE 2 DIABETES MELLITUS WITHOUT COMPLICATION, WITHOUT LONG-TERM CURRENT USE OF INSULIN (HCC): ICD-10-CM

## 2025-02-05 RX ORDER — GLIPIZIDE 5 MG/1
5 TABLET, FILM COATED, EXTENDED RELEASE ORAL DAILY
Qty: 30 TABLET | Refills: 5 | Status: SHIPPED | OUTPATIENT
Start: 2025-02-05

## 2025-03-08 ENCOUNTER — APPOINTMENT (EMERGENCY)
Dept: CT IMAGING | Facility: HOSPITAL | Age: 50
DRG: 388 | End: 2025-03-08
Payer: COMMERCIAL

## 2025-03-08 ENCOUNTER — APPOINTMENT (EMERGENCY)
Dept: RADIOLOGY | Facility: HOSPITAL | Age: 50
DRG: 388 | End: 2025-03-08
Payer: COMMERCIAL

## 2025-03-08 ENCOUNTER — APPOINTMENT (INPATIENT)
Dept: RADIOLOGY | Facility: HOSPITAL | Age: 50
DRG: 388 | End: 2025-03-08
Payer: COMMERCIAL

## 2025-03-08 ENCOUNTER — HOSPITAL ENCOUNTER (INPATIENT)
Facility: HOSPITAL | Age: 50
LOS: 3 days | Discharge: HOME | DRG: 388 | End: 2025-03-11
Attending: EMERGENCY MEDICINE | Admitting: SURGERY
Payer: COMMERCIAL

## 2025-03-08 DIAGNOSIS — K31.84 GASTROPARESIS: ICD-10-CM

## 2025-03-08 DIAGNOSIS — R14.0 BLOATING: ICD-10-CM

## 2025-03-08 DIAGNOSIS — I81 PORTAL VEIN THROMBOSIS: ICD-10-CM

## 2025-03-08 DIAGNOSIS — R10.9 ABDOMINAL PAIN: Primary | ICD-10-CM

## 2025-03-08 DIAGNOSIS — J10.1 INFLUENZA B: ICD-10-CM

## 2025-03-08 PROBLEM — K83.8 PNEUMOBILIA: Status: ACTIVE | Noted: 2025-03-08

## 2025-03-08 PROBLEM — K56.7 ILEUS (HCC): Status: ACTIVE | Noted: 2025-03-08

## 2025-03-08 LAB
ALBUMIN SERPL BCG-MCNC: 5.1 G/DL (ref 3.5–5)
ALP SERPL-CCNC: 58 U/L (ref 34–104)
ALT SERPL W P-5'-P-CCNC: 23 U/L (ref 7–52)
ANION GAP SERPL CALCULATED.3IONS-SCNC: 10 MMOL/L (ref 4–13)
AST SERPL W P-5'-P-CCNC: 21 U/L (ref 13–39)
BACTERIA UR QL AUTO: ABNORMAL /HPF
BASOPHILS # BLD AUTO: 0.03 THOUSANDS/ÂΜL (ref 0–0.1)
BASOPHILS NFR BLD AUTO: 0 % (ref 0–1)
BILIRUB SERPL-MCNC: 0.66 MG/DL (ref 0.2–1)
BILIRUB UR QL STRIP: NEGATIVE
BUN SERPL-MCNC: 15 MG/DL (ref 5–25)
CALCIUM SERPL-MCNC: 10.5 MG/DL (ref 8.4–10.2)
CARDIAC TROPONIN I PNL SERPL HS: <2 NG/L (ref ?–50)
CHLORIDE SERPL-SCNC: 104 MMOL/L (ref 96–108)
CLARITY UR: CLEAR
CO2 SERPL-SCNC: 30 MMOL/L (ref 21–32)
COLOR UR: ABNORMAL
CREAT SERPL-MCNC: 1.02 MG/DL (ref 0.6–1.3)
EOSINOPHIL # BLD AUTO: 0.07 THOUSAND/ÂΜL (ref 0–0.61)
EOSINOPHIL NFR BLD AUTO: 1 % (ref 0–6)
ERYTHROCYTE [DISTWIDTH] IN BLOOD BY AUTOMATED COUNT: 12.7 % (ref 11.6–15.1)
FLUAV AG UPPER RESP QL IA.RAPID: NEGATIVE
FLUBV AG UPPER RESP QL IA.RAPID: POSITIVE
GFR SERPL CREATININE-BSD FRML MDRD: 85 ML/MIN/1.73SQ M
GLUCOSE SERPL-MCNC: 117 MG/DL (ref 65–140)
GLUCOSE SERPL-MCNC: 132 MG/DL (ref 65–140)
GLUCOSE UR STRIP-MCNC: ABNORMAL MG/DL
HCT VFR BLD AUTO: 56.5 % (ref 36.5–49.3)
HGB BLD-MCNC: 18.5 G/DL (ref 12–17)
HGB UR QL STRIP.AUTO: NEGATIVE
IMM GRANULOCYTES # BLD AUTO: 0.03 THOUSAND/UL (ref 0–0.2)
IMM GRANULOCYTES NFR BLD AUTO: 0 % (ref 0–2)
KETONES UR STRIP-MCNC: NEGATIVE MG/DL
LACTATE SERPL-SCNC: 1.2 MMOL/L (ref 0.5–2)
LEUKOCYTE ESTERASE UR QL STRIP: ABNORMAL
LIPASE SERPL-CCNC: 36 U/L (ref 11–82)
LYMPHOCYTES # BLD AUTO: 1.57 THOUSANDS/ÂΜL (ref 0.6–4.47)
LYMPHOCYTES NFR BLD AUTO: 17 % (ref 14–44)
MCH RBC QN AUTO: 28.5 PG (ref 26.8–34.3)
MCHC RBC AUTO-ENTMCNC: 32.7 G/DL (ref 31.4–37.4)
MCV RBC AUTO: 87 FL (ref 82–98)
MONOCYTES # BLD AUTO: 0.52 THOUSAND/ÂΜL (ref 0.17–1.22)
MONOCYTES NFR BLD AUTO: 6 % (ref 4–12)
MUCOUS THREADS UR QL AUTO: ABNORMAL
NEUTROPHILS # BLD AUTO: 6.99 THOUSANDS/ÂΜL (ref 1.85–7.62)
NEUTS SEG NFR BLD AUTO: 76 % (ref 43–75)
NITRITE UR QL STRIP: NEGATIVE
NON-SQ EPI CELLS URNS QL MICRO: ABNORMAL /HPF
NRBC BLD AUTO-RTO: 0 /100 WBCS
PH UR STRIP.AUTO: 5.5 [PH]
PLATELET # BLD AUTO: 182 THOUSANDS/UL (ref 149–390)
PMV BLD AUTO: 11.1 FL (ref 8.9–12.7)
POTASSIUM SERPL-SCNC: 4.4 MMOL/L (ref 3.5–5.3)
PROT SERPL-MCNC: 8.5 G/DL (ref 6.4–8.4)
PROT UR STRIP-MCNC: NEGATIVE MG/DL
RBC # BLD AUTO: 6.48 MILLION/UL (ref 3.88–5.62)
RBC #/AREA URNS AUTO: ABNORMAL /HPF
SARS-COV+SARS-COV-2 AG RESP QL IA.RAPID: NEGATIVE
SODIUM SERPL-SCNC: 144 MMOL/L (ref 135–147)
SP GR UR STRIP.AUTO: 1.03 (ref 1–1.03)
UROBILINOGEN UR STRIP-ACNC: <2 MG/DL
WBC # BLD AUTO: 9.21 THOUSAND/UL (ref 4.31–10.16)
WBC #/AREA URNS AUTO: ABNORMAL /HPF

## 2025-03-08 PROCEDURE — NC001 PR NO CHARGE: Performed by: SURGERY

## 2025-03-08 PROCEDURE — 71045 X-RAY EXAM CHEST 1 VIEW: CPT

## 2025-03-08 PROCEDURE — 83605 ASSAY OF LACTIC ACID: CPT

## 2025-03-08 PROCEDURE — 84484 ASSAY OF TROPONIN QUANT: CPT

## 2025-03-08 PROCEDURE — 81001 URINALYSIS AUTO W/SCOPE: CPT

## 2025-03-08 PROCEDURE — 87811 SARS-COV-2 COVID19 W/OPTIC: CPT

## 2025-03-08 PROCEDURE — 36415 COLL VENOUS BLD VENIPUNCTURE: CPT

## 2025-03-08 PROCEDURE — 74177 CT ABD & PELVIS W/CONTRAST: CPT

## 2025-03-08 PROCEDURE — 96361 HYDRATE IV INFUSION ADD-ON: CPT

## 2025-03-08 PROCEDURE — 87804 INFLUENZA ASSAY W/OPTIC: CPT

## 2025-03-08 PROCEDURE — 96374 THER/PROPH/DIAG INJ IV PUSH: CPT

## 2025-03-08 PROCEDURE — 82948 REAGENT STRIP/BLOOD GLUCOSE: CPT

## 2025-03-08 PROCEDURE — 85025 COMPLETE CBC W/AUTO DIFF WBC: CPT

## 2025-03-08 PROCEDURE — 83690 ASSAY OF LIPASE: CPT

## 2025-03-08 PROCEDURE — 96375 TX/PRO/DX INJ NEW DRUG ADDON: CPT

## 2025-03-08 PROCEDURE — 80053 COMPREHEN METABOLIC PANEL: CPT

## 2025-03-08 PROCEDURE — 93005 ELECTROCARDIOGRAM TRACING: CPT

## 2025-03-08 PROCEDURE — 99285 EMERGENCY DEPT VISIT HI MDM: CPT

## 2025-03-08 PROCEDURE — 99291 CRITICAL CARE FIRST HOUR: CPT | Performed by: EMERGENCY MEDICINE

## 2025-03-08 RX ORDER — PANTOPRAZOLE SODIUM 40 MG/10ML
40 INJECTION, POWDER, LYOPHILIZED, FOR SOLUTION INTRAVENOUS EVERY 12 HOURS SCHEDULED
Status: DISCONTINUED | OUTPATIENT
Start: 2025-03-08 | End: 2025-03-11 | Stop reason: HOSPADM

## 2025-03-08 RX ORDER — INSULIN LISPRO 100 [IU]/ML
1-6 INJECTION, SOLUTION INTRAVENOUS; SUBCUTANEOUS EVERY 6 HOURS SCHEDULED
Status: DISCONTINUED | OUTPATIENT
Start: 2025-03-09 | End: 2025-03-10

## 2025-03-08 RX ORDER — HYDROMORPHONE HCL/PF 1 MG/ML
0.5 SYRINGE (ML) INJECTION ONCE
Status: COMPLETED | OUTPATIENT
Start: 2025-03-08 | End: 2025-03-08

## 2025-03-08 RX ORDER — HYDROMORPHONE HCL/PF 1 MG/ML
0.5 SYRINGE (ML) INJECTION EVERY 4 HOURS PRN
Refills: 0 | Status: DISCONTINUED | OUTPATIENT
Start: 2025-03-08 | End: 2025-03-11 | Stop reason: HOSPADM

## 2025-03-08 RX ORDER — DROPERIDOL 2.5 MG/ML
1.25 INJECTION, SOLUTION INTRAMUSCULAR; INTRAVENOUS ONCE
Status: COMPLETED | OUTPATIENT
Start: 2025-03-08 | End: 2025-03-08

## 2025-03-08 RX ORDER — FLUCONAZOLE 2 MG/ML
400 INJECTION, SOLUTION INTRAVENOUS
Status: COMPLETED | OUTPATIENT
Start: 2025-03-08 | End: 2025-03-09

## 2025-03-08 RX ORDER — FAMOTIDINE 10 MG/ML
20 INJECTION, SOLUTION INTRAVENOUS ONCE
Status: COMPLETED | OUTPATIENT
Start: 2025-03-08 | End: 2025-03-08

## 2025-03-08 RX ORDER — ENOXAPARIN SODIUM 100 MG/ML
40 INJECTION SUBCUTANEOUS DAILY
Status: DISCONTINUED | OUTPATIENT
Start: 2025-03-09 | End: 2025-03-10

## 2025-03-08 RX ORDER — FLUCONAZOLE 2 MG/ML
400 INJECTION, SOLUTION INTRAVENOUS EVERY 24 HOURS
Status: DISCONTINUED | OUTPATIENT
Start: 2025-03-08 | End: 2025-03-08

## 2025-03-08 RX ORDER — PROMETHAZINE HYDROCHLORIDE 25 MG/ML
25 INJECTION, SOLUTION INTRAMUSCULAR; INTRAVENOUS EVERY 6 HOURS PRN
Status: DISCONTINUED | OUTPATIENT
Start: 2025-03-08 | End: 2025-03-11 | Stop reason: HOSPADM

## 2025-03-08 RX ORDER — METOCLOPRAMIDE HYDROCHLORIDE 5 MG/ML
10 INJECTION INTRAMUSCULAR; INTRAVENOUS ONCE
Status: COMPLETED | OUTPATIENT
Start: 2025-03-08 | End: 2025-03-08

## 2025-03-08 RX ORDER — HYDROMORPHONE HCL IN WATER/PF 6 MG/30 ML
0.2 PATIENT CONTROLLED ANALGESIA SYRINGE INTRAVENOUS
Refills: 0 | Status: DISCONTINUED | OUTPATIENT
Start: 2025-03-08 | End: 2025-03-11 | Stop reason: HOSPADM

## 2025-03-08 RX ORDER — ONDANSETRON 2 MG/ML
4 INJECTION INTRAMUSCULAR; INTRAVENOUS EVERY 6 HOURS PRN
Status: DISCONTINUED | OUTPATIENT
Start: 2025-03-08 | End: 2025-03-11 | Stop reason: HOSPADM

## 2025-03-08 RX ORDER — LORAZEPAM 2 MG/ML
1 INJECTION INTRAMUSCULAR ONCE
Status: COMPLETED | OUTPATIENT
Start: 2025-03-08 | End: 2025-03-08

## 2025-03-08 RX ORDER — FLUCONAZOLE 2 MG/ML
400 INJECTION, SOLUTION INTRAVENOUS EVERY 24 HOURS
Status: DISCONTINUED | OUTPATIENT
Start: 2025-03-09 | End: 2025-03-11 | Stop reason: HOSPADM

## 2025-03-08 RX ORDER — SODIUM CHLORIDE, SODIUM GLUCONATE, SODIUM ACETATE, POTASSIUM CHLORIDE, MAGNESIUM CHLORIDE, SODIUM PHOSPHATE, DIBASIC, AND POTASSIUM PHOSPHATE .53; .5; .37; .037; .03; .012; .00082 G/100ML; G/100ML; G/100ML; G/100ML; G/100ML; G/100ML; G/100ML
125 INJECTION, SOLUTION INTRAVENOUS CONTINUOUS
Status: DISCONTINUED | OUTPATIENT
Start: 2025-03-08 | End: 2025-03-11

## 2025-03-08 RX ORDER — ACETAMINOPHEN 10 MG/ML
1000 INJECTION, SOLUTION INTRAVENOUS EVERY 6 HOURS SCHEDULED
Status: DISPENSED | OUTPATIENT
Start: 2025-03-09 | End: 2025-03-11

## 2025-03-08 RX ORDER — LIDOCAINE HYDROCHLORIDE 20 MG/ML
1 JELLY TOPICAL ONCE
Status: COMPLETED | OUTPATIENT
Start: 2025-03-08 | End: 2025-03-08

## 2025-03-08 RX ADMIN — METOCLOPRAMIDE 10 MG: 5 INJECTION, SOLUTION INTRAMUSCULAR; INTRAVENOUS at 16:49

## 2025-03-08 RX ADMIN — IOHEXOL 80 ML: 350 INJECTION, SOLUTION INTRAVENOUS at 17:53

## 2025-03-08 RX ADMIN — DROPERIDOL 1.25 MG: 2.5 INJECTION, SOLUTION INTRAMUSCULAR; INTRAVENOUS at 18:40

## 2025-03-08 RX ADMIN — LORAZEPAM 1 MG: 2 INJECTION INTRAMUSCULAR; INTRAVENOUS at 21:03

## 2025-03-08 RX ADMIN — HYDROMORPHONE HYDROCHLORIDE 0.5 MG: 1 INJECTION, SOLUTION INTRAMUSCULAR; INTRAVENOUS; SUBCUTANEOUS at 20:31

## 2025-03-08 RX ADMIN — SODIUM CHLORIDE 1000 ML: 0.9 INJECTION, SOLUTION INTRAVENOUS at 16:50

## 2025-03-08 RX ADMIN — FLUCONAZOLE 400 MG: 400 INJECTION, SOLUTION INTRAVENOUS at 23:48

## 2025-03-08 RX ADMIN — ONDANSETRON 4 MG: 2 INJECTION INTRAMUSCULAR; INTRAVENOUS at 21:13

## 2025-03-08 RX ADMIN — TOPICAL ANESTHETIC 2 SPRAY: 200 SPRAY DENTAL; PERIODONTAL at 21:05

## 2025-03-08 RX ADMIN — LIDOCAINE HYDROCHLORIDE 1 APPLICATION: 20 JELLY TOPICAL at 21:05

## 2025-03-08 RX ADMIN — FAMOTIDINE 20 MG: 10 INJECTION INTRAVENOUS at 16:50

## 2025-03-08 RX ADMIN — PIPERACILLIN AND TAZOBACTAM 4.5 G: 36; 4.5 INJECTION, POWDER, LYOPHILIZED, FOR SOLUTION INTRAVENOUS at 21:35

## 2025-03-08 NOTE — ED ATTENDING ATTESTATION
3/8/2025  ITalia MD, saw and evaluated the patient. I have discussed the patient with the resident/non-physician practitioner and agree with the resident's/non-physician practitioner's findings, Plan of Care, and MDM as documented in the resident's/non-physician practitioner's note, except where noted. All available labs and Radiology studies were reviewed.  I was present for key portions of any procedure(s) performed by the resident/non-physician practitioner and I was immediately available to provide assistance.       At this point I agree with the current assessment done in the Emergency Department.  I have conducted an independent evaluation of this patient a history and physical is as follows:    This is a 49-year-old male patient with a relevant past medical history of diabetes, hyperlipidemia, presenting to the ED today for complaint of bloating, gassiness, abdominal pain.  Patient states that his symptoms have been present since 2 AM this morning.  They woke him up from sleep.  He had 1 episode of vomiting of nonbilious nonbloody emesis.  He has not had any diarrhea or constipation.  He has not had any focal weakness, numbness or tingling, rashes lesions bruises, chest pain, shortness of breath, or any other significantly associated symptoms.  On exam he does have some distention of his abdomen, with some decreased bowel sounds.  He has never had any abdominal surgeries.  He does take Mounjaro.  His last dose was approximately 1 week ago.  His differential diagnosis includes: Gastroparesis versus gastroenteritis versus bowel obstruction versus other.  He has a CBC showing slight hemoconcentration, metabolic panel which shows a slight elevation in his calcium.  His lipase is normal, and troponin is undetectable.  He tested positive for influenza B.  CBC showed some slight hemoconcentration.  He underwent a CT of his abdomen pelvis showing evidence for central gastroparesis, as well as air in  his portal venous system, which is nonspecific.  Could be ischemia versus due to his nausea and vomiting.  I do think that his gastroparesis is likely due to his diabetes, as well as commendation with his Mounjaro.  Patient was evaluated by surgery, and they accepted to take the patient onto their service without any further orders requested.    Critical Care Time Statement: Upon my evaluation, this patient had a high probability of imminent or life-threatening deterioration due to portal venous gas, small bowel obstruction, which required my direct attention, intervention, and personal management.  I spent a total of 45 minutes directly providing critical care services, including interpretation of complex medical databases, evaluating for the presence of life-threatening injuries or illnesses, and complex medical decision making (to support/prevent further life-threatening deterioration).. This time is exclusive of procedures, teaching, treating other patients, family meetings, and any prior time recorded by providers other than myself.        ED Course  ED Course as of 03/08/25 2129   Sat Mar 08, 2025   3549 Influenza B Rapid Antigen(!): Positive         Critical Care Time  Procedures

## 2025-03-08 NOTE — ED PROVIDER NOTES
Time reflects when diagnosis was documented in both MDM as applicable and the Disposition within this note       Time User Action Codes Description Comment    3/8/2025  5:58 PM Jessica Palacios SHEFALI Add [R10.9] Abdominal pain     3/8/2025  5:59 PM Jessica Palacios SHEFALI Add [R14.0] Bloating     3/8/2025  5:59 PM Jessica Palacios SHEFALI Add [J10.1] Influenza B     3/8/2025  7:15 PM Jessica Palacios Add [K31.84] Gastroparesis           ED Disposition       ED Disposition   Admit    Condition   Stable    Date/Time   Sat Mar 8, 2025  8:21 PM    Comment   --             Assessment & Plan       Medical Decision Making  49-year-old male presents with nausea, vomiting, abdominal distention.  History of DM with poor glycemic control.  Differential includes but not limited to DKA, gastroparesis, gastroenteritis, enteritis, cholecystitis, flu, COVID, viral syndrome, adverse medication effect    Reviewed previous hemoglobin A1c as in ED course.  Glucose in urine, likely secondary to medications.  CBC with likely hemoconcentration, fluids started.  Multiple antiemetics given during ED course.  No ischemic changes on EKG.  CT abdomen pelvis notable for portal venous gas as noted in ED course.  Discussed case with surgical team.  NG tube placed.  Admitted to surgery for above findings and CT findings..    Amount and/or Complexity of Data Reviewed  Labs: ordered.  Radiology: ordered and independent interpretation performed.    Risk  OTC drugs.  Prescription drug management.  Decision regarding hospitalization.        ED Course as of 03/09/25 0124   Sat Mar 08, 2025   1644 12/24/24 08:45  Hemoglobin A1C: 10.1 !      !: Data is abnormal   1718 EKG sinus tachycardia rate of 102, normal AK interval, normal QRS interval, QTc 435, normal axis, no ST elevation, no ST depression, no ischemic changes or STEMI.   2001 IMPRESSION:     Small amount of portal venous gas as well as multiple gas containing perigastric veins surrounding a mildly distended stomach. These  findings are nonspecific and has been found with vomiting induced elevated intragastric pressure with air spreading to   the portal venous system. However, other etiologies such as ischemia, emphysematous gastritis or other mucosal injury cannot be excluded. Surgical consultation is recommended.     In addition, there are multiple loops of mildly dilated fluid-filled small bowel throughout the abdomen and pelvis which taper gradually to normal caliber distally. Liquid stool is also noted within the colon. Differential considerations include a   gastroenteritis versus early/partial small bowel obstruction versus ileus. A repeat CT abdomen/pelvis after the administration of oral contrast is recommended for further evaluation.     No free air or free fluid.                 I personally discussed this study with COURTNYE MCNEAL on 3/8/2025 7:54 PM.           Workstation performed: [id number]         Medications   pantoprazole (PROTONIX) injection 40 mg (has no administration in time range)   multi-electrolyte (Plasmalyte-A/Isolyte-S PH 7.4/Normosol-R) IV solution (has no administration in time range)   enoxaparin (LOVENOX) subcutaneous injection 40 mg (has no administration in time range)   ondansetron (ZOFRAN) injection 4 mg (4 mg Intravenous Given 3/8/25 2113)   HYDROmorphone HCl (DILAUDID) injection 0.2 mg (has no administration in time range)   HYDROmorphone (DILAUDID) injection 0.5 mg (has no administration in time range)   acetaminophen (Ofirmev) injection 1,000 mg (has no administration in time range)   phenol (CHLORASEPTIC) 1.4 % mucosal liquid 1 spray (has no administration in time range)   promethazine (PHENERGAN) injection 25 mg (has no administration in time range)   insulin lispro (HumALOG/ADMELOG) 100 units/mL subcutaneous injection 1-6 Units (has no administration in time range)   piperacillin-tazobactam (ZOSYN) IVPB 4.5 g (4.5 g Intravenous New Bag 3/8/25 2135)     Followed by   piperacillin-tazobactam  (ZOSYN) IVPB (EXTENDED INFUSION) 4.5 g (has no administration in time range)   fluconazole (DIFLUCAN) IVPB (premix) 400 mg 200 mL (has no administration in time range)     Followed by   fluconazole (DIFLUCAN) IVPB (premix) 400 mg 200 mL (has no administration in time range)   sodium chloride 0.9 % bolus 1,000 mL (0 mL Intravenous Stopped 3/8/25 1850)   Famotidine (PF) (PEPCID) injection 20 mg (20 mg Intravenous Given 3/8/25 1650)   metoclopramide (REGLAN) injection 10 mg (10 mg Intravenous Given 3/8/25 1649)   iohexol (OMNIPAQUE) 350 MG/ML injection (MULTI-DOSE) 80 mL (80 mL Intravenous Given 3/8/25 1753)   droperidol (INAPSINE) injection 1.25 mg (1.25 mg Intravenous Given 3/8/25 1840)   HYDROmorphone (DILAUDID) injection 0.5 mg (0.5 mg Intravenous Given 3/8/25 2031)   lidocaine (URO-JET) 2 % urethral/mucosal gel 1 Application (1 Application Urethral Given 3/8/25 2105)   benzocaine (HURRICAINE) 20 % mucosal spray 2 spray (2 sprays Mucosal Given 3/8/25 2105)   LORazepam (ATIVAN) injection 1 mg (1 mg Intravenous Given 3/8/25 2103)       ED Risk Strat Scores   HEART Risk Score      Flowsheet Row Most Recent Value   Heart Score Risk Calculator    History 1 Filed at: 03/09/2025 0123   ECG 0 Filed at: 03/09/2025 0123   Age 1 Filed at: 03/09/2025 0123   Risk Factors 1 Filed at: 03/09/2025 0123   Troponin 0 Filed at: 03/09/2025 0123   HEART Score 3 Filed at: 03/09/2025 0123          HEART Risk Score      Flowsheet Row Most Recent Value   Heart Score Risk Calculator    History 1 Filed at: 03/09/2025 0123   ECG 0 Filed at: 03/09/2025 0123   Age 1 Filed at: 03/09/2025 0123   Risk Factors 1 Filed at: 03/09/2025 0123   Troponin 0 Filed at: 03/09/2025 0123   HEART Score 3 Filed at: 03/09/2025 0123                                                  History of Present Illness       Chief Complaint   Patient presents with    Abdominal Pain     Pt reports bloating overnight, passing gas and burping, worsening, states feels like fluid  in stomach, worsening pain       Past Medical History:   Diagnosis Date    Bell's palsy     Diabetes mellitus (HCC)     elevated bloodsugars     GERD (gastroesophageal reflux disease)     Hyperlipidemia       Past Surgical History:   Procedure Laterality Date    CHEST WALL BIOPSY N/A 09/15/2016    Procedure: CHEST LESION EXCISION ;  Surgeon: Alejandro Mejia MD;  Location: [facility];  Service:     LYMPH NODE BIOPSY      WA BX/EXC LYMPH NODE OPEN SUPERFICIAL Right 09/15/2016    Procedure: AXILLARY LYMPH NODE BIOPSY WITH FLOW CYTOMETRY;  Surgeon: Alejandro Mejia MD;  Location: [facility];  Service: General    TRUNK SKIN LESION EXCISIONAL BIOPSY      Benign 2.1-3 cm    UPPER ENDOSCOPY W/ ESOPHAGEAL MANOMETRY      UPPER GASTROINTESTINAL ENDOSCOPY        Family History   Problem Relation Age of Onset    Hypertension Mother     Diabetes Father     Prostate cancer Father       Social History     Tobacco Use    Smoking status: Never     Passive exposure: Never    Smokeless tobacco: Never   Vaping Use    Vaping status: Never Used   Substance Use Topics    Alcohol use: Not Currently     Comment: socially per AS    Drug use: No      E-Cigarette/Vaping    E-Cigarette Use Never User       E-Cigarette/Vaping Substances    Nicotine No     THC No     CBD No     Flavoring No     Other No     Unknown No       I have reviewed and agree with the history as documented.     49-year-old male with history of diabetes mellitus presents with bloating.  Patient states that he woke up around 0200 hrs. with sensation of generalized abdominal bloating with associated tenderness secondary to bloating.  Attempted Gas-X without relief.  Last ate at 1100 hrs., turkey sandwich, no worsening of symptoms.  Associated burping, belching, farting, nausea without vomiting.  Currently feeling subjective fevers.  Last bowel movement this morning, solid, brown, nonbloody, normal.  Medical history as above.  Last hemoglobin A1c 10.1 in December of last year.   Started Mounjaro 6 weeks ago, no recent changes to Mounjaro.  Was previously on Ozempic but discontinued due to constipation.  Denies abdominal surgeries.  Denies chest pain, shortness of breath, diaphoresis, dysuria, frequency, urgency, changes in urinary bowel habits, loss of flatus, LU, arthralgias, orthopnea.      Abdominal Pain      Review of Systems   Gastrointestinal:  Positive for abdominal pain.   All other systems reviewed and are negative.          Objective       ED Triage Vitals [03/08/25 1622]   Temperature Pulse Blood Pressure Respirations SpO2 Patient Position - Orthostatic VS   98.4 °F (36.9 °C) 104 165/94 18 97 % Sitting      Temp Source Heart Rate Source BP Location FiO2 (%) Pain Score    Oral Monitor Left arm -- 7      Vitals      Date and Time Temp Pulse SpO2 Resp BP Pain Score FACES Pain Rating User   03/08/25 2232 -- -- -- -- -- No Pain -- LM   03/08/25 2226 99.1 °F (37.3 °C) 104 89 % 24 150/93 -- -- DII   03/08/25 2115 -- 96 96 % 16 128/79 -- -- MM   03/08/25 2029 -- 103 96 % 16 145/86 7 -- MM   03/08/25 1845 -- 111 95 % 16 152/88 -- -- DB   03/08/25 1622 98.4 °F (36.9 °C) 104 97 % 18 165/94 7 -- AB            Physical Exam  Vitals and nursing note reviewed.   Constitutional:       General: He is in acute distress.      Appearance: Normal appearance. He is obese. He is not ill-appearing, toxic-appearing or diaphoretic.   HENT:      Head: Normocephalic and atraumatic.      Right Ear: External ear normal.      Left Ear: External ear normal.      Nose: Nose normal.      Mouth/Throat:      Mouth: Mucous membranes are moist.      Pharynx: Oropharynx is clear.   Eyes:      General: No scleral icterus.        Right eye: No discharge.         Left eye: No discharge.      Extraocular Movements: Extraocular movements intact.      Pupils: Pupils are equal, round, and reactive to light.   Cardiovascular:      Rate and Rhythm: Normal rate and regular rhythm.      Pulses: Normal pulses.      Heart sounds:  Normal heart sounds. No murmur heard.  Pulmonary:      Effort: Pulmonary effort is normal. No respiratory distress.      Breath sounds: Normal breath sounds. No stridor. No wheezing, rhonchi or rales.   Chest:      Chest wall: No tenderness.   Abdominal:      General: Bowel sounds are normal. There is distension.      Palpations: Abdomen is soft. There is no hepatomegaly, splenomegaly, mass or pulsatile mass.      Tenderness: There is generalized abdominal tenderness. There is no right CVA tenderness, left CVA tenderness, guarding or rebound. Negative signs include Jacob's sign, Rovsing's sign and McBurney's sign.   Musculoskeletal:      Cervical back: Neck supple.   Skin:     General: Skin is warm and dry.      Capillary Refill: Capillary refill takes less than 2 seconds.      Coloration: Skin is not cyanotic, jaundiced, mottled or pale.      Findings: No bruising, erythema, lesion, petechiae or rash.   Neurological:      General: No focal deficit present.      Mental Status: He is alert and oriented to person, place, and time. Mental status is at baseline.         Results Reviewed       Procedure Component Value Units Date/Time    Lactic acid, plasma (w/reflex if result > 2.0) [[id number]]  (Normal) Collected: 03/08/25 2009    Lab Status: Final result Specimen: Blood from Arm, Right Updated: 03/08/25 2039     LACTIC ACID 1.2 mmol/L     Narrative:      Result may be elevated if tourniquet was used during collection.    Urine Microscopic [[id number]]  (Abnormal) Collected: 03/08/25 1852    Lab Status: Final result Specimen: Urine, Clean Catch Updated: 03/08/25 2019     RBC, UA 1-2 /hpf      WBC, UA None Seen /hpf      Epithelial Cells None Seen /hpf      Bacteria, UA None Seen /hpf      MUCUS THREADS Occasional    UA w Reflex to Microscopic w Reflex to Culture [[id number]]  (Abnormal) Collected: 03/08/25 1852    Lab Status: Final result Specimen: Urine, Clean Catch Updated: 03/08/25 1949     Color, UA Light Yellow      Clarity, UA Clear     Specific Gravity, UA 1.032     pH, UA 5.5     Leukocytes, UA Elevated glucose may cause decreased leukocyte values. See urine microscopic for UWBC result     Nitrite, UA Negative     Protein, UA Negative mg/dl      Glucose, UA >=1000 (1%) mg/dl      Ketones, UA Negative mg/dl      Urobilinogen, UA <2.0 mg/dl      Bilirubin, UA Negative     Occult Blood, UA Negative    FLU/COVID Rapid Antigen (30 min. TAT) - Preferred screening test in ED [[id number]]  (Abnormal) Collected: 03/08/25 1652    Lab Status: Final result Specimen: Nares from Nose Updated: 03/08/25 0549     SARS COV Rapid Antigen Negative     Influenza A Rapid Antigen Negative     Influenza B Rapid Antigen Positive    Narrative:      This test has been performed using the Amulyteidel Janis 2 FLU+SARS Antigen test under the Emergency Use Authorization (EUA). This test has been validated by the  and verified by the performing laboratory. The Janis uses lateral flow immunofluorescent sandwich assay to detect SARS-COV, Influenza A and Influenza B Antigen.     The Quidel Janis 2 SARS Antigen test does not differentiate between SARS-CoV and SARS-CoV-2.     Negative results are presumptive and may be confirmed with a molecular assay, if necessary, for patient management. Negative results do not rule out SARS-CoV-2 or influenza infection and should not be used as the sole basis for treatment or patient management decisions. A negative test result may occur if the level of antigen in a sample is below the limit of detection of this test.     Positive results are indicative of the presence of viral antigens, but do not rule out bacterial infection or co-infection with other viruses.     All test results should be used as an adjunct to clinical observations and other information available to the provider.    FOR PEDIATRIC PATIENTS - copy/paste COVID Guidelines URL to browser:  [email or web address]    Comprehensive metabolic panel [[id number]]  (Abnormal) Collected: 03/08/25 1647    Lab Status: Final result Specimen: Blood from Arm, Right Updated: 03/08/25 1735     Sodium 144 mmol/L      Potassium 4.4 mmol/L      Chloride 104 mmol/L      CO2 30 mmol/L      ANION GAP 10 mmol/L      BUN 15 mg/dL      Creatinine 1.02 mg/dL      Glucose 117 mg/dL      Calcium 10.5 mg/dL      AST 21 U/L      ALT 23 U/L      Alkaline Phosphatase 58 U/L      Total Protein 8.5 g/dL      Albumin 5.1 g/dL      Total Bilirubin 0.66 mg/dL      eGFR 85 ml/min/1.73sq m     Narrative:      National Kid[employer] Foundation guidelines for Chronic Kidney Disease (CKD):     Stage 1 with normal or high GFR (GFR > 90 mL/min/1.73 square meters)    Stage 2 Mild CKD (GFR = 60-89 mL/min/1.73 square meters)    Stage 3A Moderate CKD (GFR = 45-59 mL/min/1.73 square meters)    Stage 3B Moderate CKD (GFR = 30-44 mL/min/1.73 square meters)    Stage 4 Severe CKD (GFR = 15-29 mL/min/1.73 square meters)    Stage 5 End Stage CKD (GFR <15 mL/min/1.73 square meters)  Note: GFR calculation is accurate only with a steady state creatinine    Lipase [[id number]]  (Normal) Collected: 03/08/25 1647    Lab Status: Final result Specimen: Blood from Arm, Right Updated: 03/08/25 1735     Lipase 36 u/L     HS Troponin 0hr (reflex protocol) [[id number]]  (Normal) Collected: 03/08/25 1647    Lab Status: Final result Specimen: Blood from Arm, Right Updated: 03/08/25 1724     hs TnI 0hr <2 ng/L     CBC and differential [[id number]]  (Abnormal) Collected: 03/08/25 1647    Lab Status: Final result Specimen: Blood from Arm, Right Updated: 03/08/25 1707     WBC 9.21 Thousand/uL      RBC 6.48 Million/uL      Hemoglobin 18.5 g/dL      Hematocrit 56.5 %      MCV 87 fL      MCH 28.5 pg      MCHC 32.7 g/dL      RDW 12.7 %      MPV 11.1 fL      Platelets 182 Thousands/uL      nRBC 0 /100 WBCs      Segmented % 76 %       Immature Grans % 0 %      Lymphocytes % 17 %      Monocytes % 6 %      Eosinophils Relative 1 %      Basophils Relative 0 %      Absolute Neutrophils 6.99 Thousands/µL      Absolute Immature Grans 0.03 Thousand/uL      Absolute Lymphocytes 1.57 Thousands/µL      Absolute Monocytes 0.52 Thousand/µL      Eosinophils Absolute 0.07 Thousand/µL      Basophils Absolute 0.03 Thousands/µL             CT abdomen pelvis with contrast   Final Interpretation by Poncho Bacon MD (03/08 1958)      Small amount of portal venous gas as well as multiple gas containing perigastric veins surrounding a mildly distended stomach. These findings are nonspecific and has been found with vomiting induced elevated intragastric pressure with air spreading to    the portal venous system. However, other etiologies such as ischemia, emphysematous gastritis or other mucosal injury cannot be excluded. Surgical consultation is recommended.      In addition, there are multiple loops of mildly dilated fluid-filled small bowel throughout the abdomen and pelvis which taper gradually to normal caliber distally. Liquid stool is also noted within the colon. Differential considerations include a    gastroenteritis versus early/partial small bowel obstruction versus ileus. A repeat CT abdomen/pelvis after the administration of oral contrast is recommended for further evaluation.      No free air or free fluid.                  I personally discussed this study with COURTNEY MCNEAL on 3/8/2025 7:54 PM.            Workstation performed: [id number]         XR chest 1 view portable   ED Interpretation by Jessica Palacios MD (03/08 1725)   No acute cardiopulmonary process noted.      XR chest portable    (Results Pending)       Procedures    ED Medication and Procedure Management   Prior to Admission Medications   Prescriptions Last Dose Informant Patient Reported? Taking?   Empagliflozin (Jardiance) 25 MG TABS   No No   Sig: TAKE 1 TABLET EVERY  MORNING   Lancets (freestyle) lancets  Self No No   Sig: USE ONCE A DAY   Multiple Vitamin (MULTIVITAMIN PO)  Self Yes No   Sig: Take by mouth in the morning   Tirzepatide (Mounjaro) 2.5 MG/0.5ML SOAJ   No No   Sig: Inject 2.5 mg under the skin once a week   atorvastatin (LIPITOR) 10 mg tablet   No No   Sig: TAKE 1 TABLET DAILY   glipiZIDE (GLUCOTROL XL) 5 mg 24 hr tablet   No No   Sig: TAKE 1 TABLET DAILY   glucose blood (FREESTYLE TEST STRIPS) test strip  Self No No   Sig: TEST BLOOD SUGARS ONCE DAILY   glucose monitoring kit (FREESTYLE) monitoring kit  Self No No   Sig: Use 1 each daily   indomethacin (INDOCIN) 50 mg capsule  Self No No   Sig: Take 1 capsule (50 mg total) by mouth every 8 (eight) hours as needed for mild pain or moderate pain   Patient not taking: Reported on 12/24/2024   omeprazole (PriLOSEC) 20 mg delayed release capsule   No No   Sig: Take 1 capsule (20 mg total) by mouth daily before breakfast   sildenafil (VIAGRA) 50 MG tablet   No No   Sig: Take 1 tablet (50 mg total) by mouth as needed for erectile dysfunction      Facility-Administered Medications: None     Current Discharge Medication List        CONTINUE these medications which have NOT CHANGED    Details   atorvastatin (LIPITOR) 10 mg tablet TAKE 1 TABLET DAILY  Qty: 90 tablet, Refills: 0    Associated Diagnoses: Mixed hyperlipidemia      Empagliflozin (Jardiance) 25 MG TABS TAKE 1 TABLET EVERY MORNING  Qty: 90 tablet, Refills: 1    Associated Diagnoses: Type 2 diabetes mellitus without complication, without long-term current use of insulin ([facility])      glipiZIDE (GLUCOTROL XL) 5 mg 24 hr tablet TAKE 1 TABLET DAILY  Qty: 30 tablet, Refills: 5    Associated Diagnoses: Type 2 diabetes mellitus without complication, without long-term current use of insulin (HCC)      glucose blood (FREESTYLE TEST STRIPS) test strip TEST BLOOD SUGARS ONCE DAILY  Qty: 100 strip, Refills: 3    Associated Diagnoses: Type 2 diabetes mellitus without  complication, without long-term current use of insulin ([facility])      glucose monitoring kit (FREESTYLE) monitoring kit Use 1 each daily  Qty: 1 each, Refills: 1    Associated Diagnoses: Type 2 diabetes mellitus without complication, without long-term current use of insulin ([facility])      indomethacin (INDOCIN) 50 mg capsule Take 1 capsule (50 mg total) by mouth every 8 (eight) hours as needed for mild pain or moderate pain  Qty: 270 capsule, Refills: 0    Associated Diagnoses: Gout, unspecified cause, unspecified chronicity, unspecified site      Lancets (freestyle) lancets USE ONCE A DAY  Qty: 100 each, Refills: 3    Associated Diagnoses: Type 2 diabetes mellitus without complication, without long-term current use of insulin ([facility])      Multiple Vitamin (MULTIVITAMIN PO) Take by mouth in the morning      omeprazole (PriLOSEC) 20 mg delayed release capsule Take 1 capsule (20 mg total) by mouth daily before breakfast  Qty: 90 capsule, Refills: 1    Associated Diagnoses: GERD without esophagitis      sildenafil (VIAGRA) 50 MG tablet Take 1 tablet (50 mg total) by mouth as needed for erectile dysfunction  Qty: 30 tablet, Refills: 0    Associated Diagnoses: Erectile dysfunction, unspecified erectile dysfunction type      Tirzepatide (Mounjaro) 2.5 MG/0.5ML SOAJ Inject 2.5 mg under the skin once a week  Qty: 6 mL, Refills: 0    Associated Diagnoses: Type 2 diabetes mellitus without complication, without long-term current use of insulin ([facility])           No discharge procedures on file.  ED SEPSIS DOCUMENTATION   Time reflects when diagnosis was documented in both MDM as applicable and the Disposition within this note       Time User Action Codes Description Comment    3/8/2025  5:58 PM Jessica Palacios Add [R10.9] Abdominal pain     3/8/2025  5:59 PM Jessica Palacios Add [R14.0] Bloating     3/8/2025  5:59 PM Jessica Palacios Add [J10.1] Influenza B     3/8/2025  7:15 PM Jessica Palacios Add [K31.84] Gastroparesis                  Jessica  Chevy Palacios MD  03/09/25 0124

## 2025-03-09 LAB
ANION GAP SERPL CALCULATED.3IONS-SCNC: 11 MMOL/L (ref 4–13)
BASOPHILS # BLD AUTO: 0.02 THOUSANDS/ÂΜL (ref 0–0.1)
BASOPHILS NFR BLD AUTO: 0 % (ref 0–1)
BUN SERPL-MCNC: 18 MG/DL (ref 5–25)
CALCIUM SERPL-MCNC: 9.1 MG/DL (ref 8.4–10.2)
CHLORIDE SERPL-SCNC: 108 MMOL/L (ref 96–108)
CO2 SERPL-SCNC: 27 MMOL/L (ref 21–32)
CREAT SERPL-MCNC: 0.95 MG/DL (ref 0.6–1.3)
EOSINOPHIL # BLD AUTO: 0.09 THOUSAND/ÂΜL (ref 0–0.61)
EOSINOPHIL NFR BLD AUTO: 1 % (ref 0–6)
ERYTHROCYTE [DISTWIDTH] IN BLOOD BY AUTOMATED COUNT: 12.7 % (ref 11.6–15.1)
GFR SERPL CREATININE-BSD FRML MDRD: 93 ML/MIN/1.73SQ M
GLUCOSE SERPL-MCNC: 104 MG/DL (ref 65–140)
GLUCOSE SERPL-MCNC: 126 MG/DL (ref 65–140)
GLUCOSE SERPL-MCNC: 132 MG/DL (ref 65–140)
GLUCOSE SERPL-MCNC: 159 MG/DL (ref 65–140)
GLUCOSE SERPL-MCNC: 76 MG/DL (ref 65–140)
GLUCOSE SERPL-MCNC: 79 MG/DL (ref 65–140)
HCT VFR BLD AUTO: 50.2 % (ref 36.5–49.3)
HGB BLD-MCNC: 16.5 G/DL (ref 12–17)
IMM GRANULOCYTES # BLD AUTO: 0.04 THOUSAND/UL (ref 0–0.2)
IMM GRANULOCYTES NFR BLD AUTO: 0 % (ref 0–2)
LYMPHOCYTES # BLD AUTO: 1.47 THOUSANDS/ÂΜL (ref 0.6–4.47)
LYMPHOCYTES NFR BLD AUTO: 14 % (ref 14–44)
MAGNESIUM SERPL-MCNC: 1.9 MG/DL (ref 1.9–2.7)
MCH RBC QN AUTO: 28.4 PG (ref 26.8–34.3)
MCHC RBC AUTO-ENTMCNC: 32.9 G/DL (ref 31.4–37.4)
MCV RBC AUTO: 86 FL (ref 82–98)
MONOCYTES # BLD AUTO: 0.84 THOUSAND/ÂΜL (ref 0.17–1.22)
MONOCYTES NFR BLD AUTO: 8 % (ref 4–12)
NEUTROPHILS # BLD AUTO: 8.11 THOUSANDS/ÂΜL (ref 1.85–7.62)
NEUTS SEG NFR BLD AUTO: 77 % (ref 43–75)
NRBC BLD AUTO-RTO: 0 /100 WBCS
PHOSPHATE SERPL-MCNC: 4.2 MG/DL (ref 2.7–4.5)
PLATELET # BLD AUTO: 211 THOUSANDS/UL (ref 149–390)
PMV BLD AUTO: 9.9 FL (ref 8.9–12.7)
POTASSIUM SERPL-SCNC: 3.8 MMOL/L (ref 3.5–5.3)
RBC # BLD AUTO: 5.82 MILLION/UL (ref 3.88–5.62)
SODIUM SERPL-SCNC: 146 MMOL/L (ref 135–147)
WBC # BLD AUTO: 10.57 THOUSAND/UL (ref 4.31–10.16)

## 2025-03-09 PROCEDURE — 83735 ASSAY OF MAGNESIUM: CPT | Performed by: SURGERY

## 2025-03-09 PROCEDURE — 84100 ASSAY OF PHOSPHORUS: CPT | Performed by: SURGERY

## 2025-03-09 PROCEDURE — 80048 BASIC METABOLIC PNL TOTAL CA: CPT | Performed by: SURGERY

## 2025-03-09 PROCEDURE — 85025 COMPLETE CBC W/AUTO DIFF WBC: CPT | Performed by: SURGERY

## 2025-03-09 PROCEDURE — 82948 REAGENT STRIP/BLOOD GLUCOSE: CPT

## 2025-03-09 PROCEDURE — 99232 SBSQ HOSP IP/OBS MODERATE 35: CPT | Performed by: SURGERY

## 2025-03-09 RX ORDER — MAGNESIUM SULFATE HEPTAHYDRATE 40 MG/ML
2 INJECTION, SOLUTION INTRAVENOUS ONCE
Status: COMPLETED | OUTPATIENT
Start: 2025-03-09 | End: 2025-03-09

## 2025-03-09 RX ORDER — POTASSIUM CHLORIDE 14.9 MG/ML
20 INJECTION INTRAVENOUS ONCE
Status: COMPLETED | OUTPATIENT
Start: 2025-03-09 | End: 2025-03-09

## 2025-03-09 RX ORDER — DEXTROSE MONOHYDRATE 25 G/50ML
25 INJECTION, SOLUTION INTRAVENOUS ONCE
Status: COMPLETED | OUTPATIENT
Start: 2025-03-09 | End: 2025-03-09

## 2025-03-09 RX ADMIN — PANTOPRAZOLE SODIUM 40 MG: 40 INJECTION, POWDER, LYOPHILIZED, FOR SOLUTION INTRAVENOUS at 03:12

## 2025-03-09 RX ADMIN — SODIUM CHLORIDE, SODIUM ACETATE ANHYDROUS, SODIUM GLUCONATE, POTASSIUM CHLORIDE, AND MAGNESIUM CHLORIDE 125 ML/HR: 526; 222; 502; 37; 30 INJECTION, SOLUTION INTRAVENOUS at 15:24

## 2025-03-09 RX ADMIN — ENOXAPARIN SODIUM 40 MG: 40 INJECTION SUBCUTANEOUS at 08:37

## 2025-03-09 RX ADMIN — POTASSIUM CHLORIDE 20 MEQ: 14.9 INJECTION, SOLUTION INTRAVENOUS at 09:59

## 2025-03-09 RX ADMIN — HYDROMORPHONE HYDROCHLORIDE 0.2 MG: 0.2 INJECTION, SOLUTION INTRAMUSCULAR; INTRAVENOUS; SUBCUTANEOUS at 21:55

## 2025-03-09 RX ADMIN — HYDROMORPHONE HYDROCHLORIDE 0.2 MG: 0.2 INJECTION, SOLUTION INTRAMUSCULAR; INTRAVENOUS; SUBCUTANEOUS at 03:16

## 2025-03-09 RX ADMIN — FLUCONAZOLE 400 MG: 400 INJECTION, SOLUTION INTRAVENOUS at 03:11

## 2025-03-09 RX ADMIN — MAGNESIUM SULFATE HEPTAHYDRATE 2 G: 40 INJECTION, SOLUTION INTRAVENOUS at 09:59

## 2025-03-09 RX ADMIN — PANTOPRAZOLE SODIUM 40 MG: 40 INJECTION, POWDER, LYOPHILIZED, FOR SOLUTION INTRAVENOUS at 08:36

## 2025-03-09 RX ADMIN — SODIUM CHLORIDE, SODIUM ACETATE ANHYDROUS, SODIUM GLUCONATE, POTASSIUM CHLORIDE, AND MAGNESIUM CHLORIDE 125 ML/HR: 526; 222; 502; 37; 30 INJECTION, SOLUTION INTRAVENOUS at 05:58

## 2025-03-09 RX ADMIN — PIPERACILLIN SODIUM AND TAZOBACTAM SODIUM 4.5 G: 36; 4.5 INJECTION, POWDER, LYOPHILIZED, FOR SOLUTION INTRAVENOUS at 20:26

## 2025-03-09 RX ADMIN — PIPERACILLIN SODIUM AND TAZOBACTAM SODIUM 4.5 G: 36; 4.5 INJECTION, POWDER, LYOPHILIZED, FOR SOLUTION INTRAVENOUS at 04:17

## 2025-03-09 RX ADMIN — ACETAMINOPHEN 1000 MG: 10 INJECTION INTRAVENOUS at 21:54

## 2025-03-09 RX ADMIN — DEXTROSE MONOHYDRATE 25 G: 25 INJECTION, SOLUTION INTRAVENOUS at 19:18

## 2025-03-09 RX ADMIN — FLUCONAZOLE 400 MG: 400 INJECTION, SOLUTION INTRAVENOUS at 22:41

## 2025-03-09 RX ADMIN — ACETAMINOPHEN 1000 MG: 10 INJECTION INTRAVENOUS at 10:03

## 2025-03-09 RX ADMIN — ACETAMINOPHEN 1000 MG: 10 INJECTION INTRAVENOUS at 03:48

## 2025-03-09 RX ADMIN — ACETAMINOPHEN 1000 MG: 10 INJECTION INTRAVENOUS at 17:42

## 2025-03-09 RX ADMIN — PANTOPRAZOLE SODIUM 40 MG: 40 INJECTION, POWDER, LYOPHILIZED, FOR SOLUTION INTRAVENOUS at 21:54

## 2025-03-09 RX ADMIN — PIPERACILLIN SODIUM AND TAZOBACTAM SODIUM 4.5 G: 36; 4.5 INJECTION, POWDER, LYOPHILIZED, FOR SOLUTION INTRAVENOUS at 12:10

## 2025-03-09 NOTE — ASSESSMENT & PLAN NOTE
- In setting of ileus, nausea, vomiting.   - 3/8 CT AP: Small amount of portal venous gas as well as multiple gas containing perigastric veins surrounding a mildly distended stomach. Multiple loops of mildly dilated fluid-filled small bowel throughout the abdomen and pelvis which taper gradually to normal caliber distally. Liquid stool is also noted within the colon.  - Etiology most likely 2/2 retching, increased abdominal pressure. Lactate normal. Abdomen distended, non-tender, no rebound or guarding. However, cannot rule out more serious etiologies at this time.  - NPO  - NGT to medium intermittent suction  - PRN antiemetics and analgesia. Scheduled tylenol.  - IV Zosyn/Fluconazole  - Interval CT AP with oral contrast, on 3/10  - BID Protonix  - DVT ppx with Lovenox  - SSI, maintain euglycemia  - Replete electrolytes for goal K>4, PO4>3, Mg>2  - Encourage OOB, ambulation

## 2025-03-09 NOTE — PROGRESS NOTES
Progress Note - Surgery-General   Name: Iam Delgado 49 y.o. male I MRN: [id number]  Unit/Bed#: S -01 I Date of Admission: 3/8/2025   Date of Service: 3/9/2025 I Hospital Day: 1     Assessment & Plan  Portal Venous Gas  - In setting of ileus, nausea, vomiting.   - 3/8 CT AP: Small amount of portal venous gas as well as multiple gas containing perigastric veins surrounding a mildly distended stomach. Multiple loops of mildly dilated fluid-filled small bowel throughout the abdomen and pelvis which taper gradually to normal caliber distally. Liquid stool is also noted within the colon.  - Etiology most likely 2/2 retching, increased abdominal pressure. Lactate normal. Abdomen distended, non-tender, no rebound or guarding. However, cannot rule out more serious etiologies at this time.  - NPO  - NGT to medium intermittent suction  - PRN antiemetics and analgesia. Scheduled tylenol.  - IV Zosyn/Fluconazole  - Interval CT AP with oral contrast, on 3/10  - BID Protonix  - DVT ppx with Lovenox  - SSI, maintain euglycemia  - Replete electrolytes for goal K>4, PO4>3, Mg>2  - Encourage OOB, ambulation  Ileus (HCC)  - With associated nausea, vomiting. In setting of Flu and recent Mounjaro use.  - Replete electrolytes for goal K>4, PO4>3, Mg>2  - See plan above    Please contact the SecureChat role for the Surgery-General service with any questions/concerns.    Subjective   No acute events overnight. Afebrile, hemodynamically stable.  No nausea, or vomiting, fevers or chills.       Objective :  Temp:  [98.4 °F (36.9 °C)-99.1 °F (37.3 °C)] 98.9 °F (37.2 °C)  HR:  [] 86  BP: (128-165)/(79-94) 133/86  Resp:  [15-24] 15  SpO2:  [89 %-97 %] 93 %  O2 Device: Nasal cannula  Nasal Cannula O2 Flow Rate (L/min):  [3 L/min] 3 L/min    I/O         03/07 0701 03/08 0700 03/08 0701 03/09 0700 03/09 0701  03/10 0700    Emesis/NG output  450     Total Output  450     Net  -450                  Lines/Drains/Airways        Active Status       Name Placement date Placement time Site Days    NG/OG/Enteral Tube Nasogastric 18 Fr Right nare 03/08/25 2124  Right nare  less than 1                    Physical Exam:  General: No acute distress, alert and oriented  CV: Well perfused, regular rate and rhythm  Lungs: Normal work of breathing, no increased respiratory effort  Abdomen: Soft, non-tender, distended.   Extremities: No edema, clubbing or cyanosis  Skin: Warm, dry      Lab Results: I have reviewed the following results:  Recent Labs     03/08/25  1647 03/08/25 2009 03/09/25  0335   WBC 9.21  --  10.57*   HGB 18.5*  --  16.5   HCT 56.5*  --  50.2*     --  211   SODIUM 144  --  146   K 4.4  --  3.8     --  108   CO2 30  --  27   BUN 15  --  18   CREATININE 1.02  --  0.95   GLUC 117  --  132   MG  --   --  1.9   PHOS  --   --  4.2   AST 21  --   --    ALT 23  --   --    ALB 5.1*  --   --    TBILI 0.66  --   --    ALKPHOS 58  --   --    HSTNI0 <2  --   --    LACTICACID  --  1.2  --        Imaging Results Review: No pertinent imaging studies reviewed.  Other Study Results Review: No additional pertinent studies reviewed.    VTE Pharmacologic Prophylaxis: VTE covered by:  enoxaparin, Subcutaneous     VTE Mechanical Prophylaxis: sequential compression device    Lenin Kent MD  General Surgery   03/09/25

## 2025-03-09 NOTE — PLAN OF CARE
Problem: PAIN - ADULT  Goal: Verbalizes/displays adequate comfort level or baseline comfort level  Description: Interventions:  - Encourage patient to monitor pain and request assistance  - Assess pain using appropriate pain scale  - Administer analgesics based on type and severity of pain and evaluate response  - Implement non-pharmacological measures as appropriate and evaluate response  - Consider cultural and social influences on pain and pain management  - Notify physician/advanced practitioner if interventions unsuccessful or patient reports new pain  Outcome: Progressing     Problem: INFECTION - ADULT  Goal: Absence or prevention of progression during hospitalization  Description: INTERVENTIONS:  - Assess and monitor for signs and symptoms of infection  - Monitor lab/diagnostic results  - Monitor all insertion sites, i.e. indwelling lines, tubes, and drains  - Monitor endotracheal if appropriate and nasal secretions for changes in amount and color  - [place] appropriate cooling/warming therapies per order  - Administer medications as ordered  - Instruct and encourage patient and family to use good hand hygiene technique  - Identify and instruct in appropriate isolation precautions for identified infection/condition  Outcome: Progressing  Goal: Absence of fever/infection during neutropenic period  Description: INTERVENTIONS:  - Monitor WBC    Outcome: Progressing     Problem: SAFETY ADULT  Goal: Patient will remain free of falls  Description: INTERVENTIONS:  - Educate patient/family on patient safety including physical limitations  - Instruct patient to call for assistance with activity   - Consult OT/PT to assist with strengthening/mobility   - Keep Call bell within reach  - Keep bed low and locked with side rails adjusted as appropriate  - Keep care items and personal belongings within reach  - Initiate and maintain comfort rounds  - Obtain necessary fall risk management equipment:   - Apply yellow socks  and bracelet for high fall risk patients  - Consider moving patient to room near nurses station  Outcome: Progressing  Goal: Maintain or return to baseline ADL function  Description: INTERVENTIONS:  -  Assess patient's ability to carry out ADLs; assess patient's baseline for ADL function and identify physical deficits which impact ability to perform ADLs (bathing, care of mouth/teeth, toileting, grooming, dressing, etc.)  - Assess/evaluate cause of self-care deficits   - Assess range of motion  - Assess patient's mobility; develop plan if impaired  - Assess patient's need for assistive devices and provide as appropriate  - Encourage maximum independence but intervene and supervise when necessary  - Involve family in performance of ADLs  - Assess for home care needs following discharge   - Consider OT consult to assist with ADL evaluation and planning for discharge  - Provide patient education as appropriate  Outcome: Progressing  Goal: Maintains/Returns to pre admission functional level  Description: INTERVENTIONS:  - Perform AM-PAC 6 Click Basic Mobility/ Daily Activity assessment daily.  - Set and communicate daily mobility goal to care team and patient/family/caregiver.   - Collaborate with rehabilitation services on mobility goals if consulted  - Perform Range of Motion   - Reposition patient every .  - Dangle patient   - Stand patient   - Ambulate patient   - Out of bed to chair   - Out of bed for meals   - Out of bed for toileting  - Record patient progress and toleration of activity level   Outcome: Progressing     Problem: DISCHARGE PLANNING  Goal: Discharge to home or other facility with appropriate resources  Description: INTERVENTIONS:  - Identify barriers to discharge w/patient and caregiver  - Arrange for needed discharge resources and transportation as appropriate  - Identify discharge learning needs (meds, wound care, etc.)  - Arrange for interpretive services to assist at discharge as needed  - Refer  to Case Management Department for coordinating discharge planning if the patient needs post-hospital services based on physician/advanced practitioner order or complex needs related to functional status, cognitive ability, or social support system  Outcome: Progressing     Problem: Knowledge Deficit  Goal: Patient/family/caregiver demonstrates understanding of disease process, treatment plan, medications, and discharge instructions  Description: Complete learning assessment and assess knowledge base.  Interventions:  - Provide teaching at level of understanding  - Provide teaching via preferred learning methods  Outcome: Progressing

## 2025-03-09 NOTE — ASSESSMENT & PLAN NOTE
- With associated nausea, vomiting. In setting of Flu and recent Mounjaro use.  - Replete electrolytes for goal K>4, PO4>3, Mg>2  - See plan above

## 2025-03-09 NOTE — H&P
H&P - Surgery-General   Name: Iam Delgado 49 y.o. male I MRN: [id number]  Unit/Bed#: ED-06 I Date of Admission: 3/8/2025   Date of Service: 3/8/2025 I Hospital Day: 0     Assessment & Plan  Portal Venous Gas  - In setting of ileus, nausea, vomiting.   - 3/8 CT AP: Small amount of portal venous gas as well as multiple gas containing perigastric veins surrounding a mildly distended stomach. Multiple loops of mildly dilated fluid-filled small bowel throughout the abdomen and pelvis which taper gradually to normal caliber distally. Liquid stool is also noted within the colon.  - Admission to general surgery service, MS level of care  - NPO  - NGT to medium intermittent suction  - PRN antiemetics and analgesia. Scheduled tylenol.  - Etiology most likely 2/2 retching, increased abdominal pressure. Lactate normal. Abdomen distended, non-tender, no rebound or guarding. However, cannot rule out more serious etiologies at this time. Continue serial abdominal exams.   - IV Zosyn/Fluconazole  - Interval CT AP with oral contrast pending clinical progression  - BID Protonix  - DVT ppx with Lovenox  - SSI, maintain euglycemia  - Replete electrolytes for goal K>4, PO4>3, Mg>2  - Encourage OOB, ambulation  Ileus (HCC)  - With associated nausea, vomiting. In setting of Flu and recent Mounjaro use.  - Replete electrolytes for goal K>4, PO4>3, Mg>2  - See plan above    History of Present Illness   Iam Delgado is a 49 y.o. male who presents with nausea, abdominal pain, distention. He has a PMH of DM2, HLD, and GERD. He presents with one day of nausea, abdominal pain, bloating. Denies prior similar episodes. Denies any recent cough, fevers, chills, chest pain, or shortness of breath. He is passing flatus and having bowel movements.No prior abdominal surgeries. Not on AC/AP medications. Labs notable for a lactate of 1.2. WBC of 9.2. Creatinine of 1.02. Flu B positive. CT AP was obtained which found small amount of portal  venous gas as well as multiple gas containing perigastric veins surrounding a mildly distended stomach. Multiple loops of mildly dilated fluid-filled small bowel throughout the abdomen and pelvis which taper gradually to normal caliber distally. Liquid stool is also noted within the colon. Prior EGD was obtained but unavailable for review. On exam his abdomen is distended, non-tender, without rebound or guarding.     Review of Systems  Review of systems negative except as per HPI.     Medical History Review: I have reviewed the patient's PMH, PSH, Social History, Family History, Meds, and Allergies   Historical Information   Past Medical History:   Diagnosis Date    Bell's palsy     Diabetes mellitus (HCC)     elevated bloodsugars     GERD (gastroesophageal reflux disease)     Hyperlipidemia      Past Surgical History:   Procedure Laterality Date    CHEST WALL BIOPSY N/A 09/15/2016    Procedure: CHEST LESION EXCISION ;  Surgeon: Alejandro Mejia MD;  Location: [facility];  Service:     LYMPH NODE BIOPSY      AK BX/EXC LYMPH NODE OPEN SUPERFICIAL Right 09/15/2016    Procedure: AXILLARY LYMPH NODE BIOPSY WITH FLOW CYTOMETRY;  Surgeon: Alejandro Mejia MD;  Location: [facility];  Service: General    TRUNK SKIN LESION EXCISIONAL BIOPSY      Benign 2.1-3 cm    UPPER ENDOSCOPY W/ ESOPHAGEAL MANOMETRY      UPPER GASTROINTESTINAL ENDOSCOPY       Social History     Tobacco Use    Smoking status: Never     Passive exposure: Never    Smokeless tobacco: Never   Vaping Use    Vaping status: Never Used   Substance and Sexual Activity    Alcohol use: Not Currently     Comment: socially per AS    Drug use: No    Sexual activity: Yes     Partners: Female     E-Cigarette/Vaping    E-Cigarette Use Never User      E-Cigarette/Vaping Substances    Nicotine No     THC No     CBD No     Flavoring No     Other No     Unknown No      Family History   Problem Relation Age of Onset    Hypertension Mother     Diabetes Father     Prostate cancer Father       Social History     Tobacco Use    Smoking status: Never     Passive exposure: Never    Smokeless tobacco: Never   Vaping Use    Vaping status: Never Used   Substance and Sexual Activity    Alcohol use: Not Currently     Comment: socially per AS    Drug use: No    Sexual activity: Yes     Partners: Female       Current Facility-Administered Medications:     [START ON 3/9/2025] acetaminophen (Ofirmev) injection 1,000 mg, Q6H JULISA    [START ON 3/9/2025] enoxaparin (LOVENOX) subcutaneous injection 40 mg, Daily    fluconazole (DIFLUCAN) IVPB (premix) 400 mg 200 mL, Q24H    HYDROmorphone (DILAUDID) injection 0.5 mg, Q4H PRN    HYDROmorphone HCl (DILAUDID) injection 0.2 mg, Q3H PRN    [START ON 3/9/2025] insulin lispro (HumALOG/ADMELOG) 100 units/mL subcutaneous injection 1-6 Units, Q6H JULISA **AND** [START ON 3/9/2025] Fingerstick Glucose (POCT), Q6H    multi-electrolyte (Plasmalyte-A/Isolyte-S PH 7.4/Normosol-R) IV solution, Continuous    ondansetron (ZOFRAN) injection 4 mg, Q6H PRN    pantoprazole (PROTONIX) injection 40 mg, Q12H JULISA    phenol (CHLORASEPTIC) 1.4 % mucosal liquid 1 spray, Q2H PRN    piperacillin-tazobactam (ZOSYN) IVPB 4.5 g, Once **FOLLOWED BY** [START ON 3/9/2025] piperacillin-tazobactam (ZOSYN) IVPB (EXTENDED INFUSION) 4.5 g, Q8H    promethazine (PHENERGAN) injection 25 mg, Q6H PRN  Prior to Admission Medications   Prescriptions Last Dose Informant Patient Reported? Taking?   Empagliflozin (Jardiance) 25 MG TABS   No No   Sig: TAKE 1 TABLET EVERY MORNING   Lancets (freestyle) lancets  Self No No   Sig: USE ONCE A DAY   Multiple Vitamin (MULTIVITAMIN PO)  Self Yes No   Sig: Take by mouth in the morning   Tirzepatide (Mounjaro) 2.5 MG/0.5ML SOAJ   No No   Sig: Inject 2.5 mg under the skin once a week   atorvastatin (LIPITOR) 10 mg tablet   No No   Sig: TAKE 1 TABLET DAILY   glipiZIDE (GLUCOTROL XL) 5 mg 24 hr tablet   No No   Sig: TAKE 1 TABLET DAILY   glucose blood (FREESTYLE TEST STRIPS) test strip   Self No No   Sig: TEST BLOOD SUGARS ONCE DAILY   glucose monitoring kit (FREESTYLE) monitoring kit  Self No No   Sig: Use 1 each daily   indomethacin (INDOCIN) 50 mg capsule  Self No No   Sig: Take 1 capsule (50 mg total) by mouth every 8 (eight) hours as needed for mild pain or moderate pain   Patient not taking: Reported on 12/24/2024   omeprazole (PriLOSEC) 20 mg delayed release capsule   No No   Sig: Take 1 capsule (20 mg total) by mouth daily before breakfast   sildenafil (VIAGRA) 50 MG tablet   No No   Sig: Take 1 tablet (50 mg total) by mouth as needed for erectile dysfunction      Facility-Administered Medications: None     Patient has no known allergies.    Objective :  Temp:  [98.4 °F (36.9 °C)] 98.4 °F (36.9 °C)  HR:  [103-111] 103  BP: (145-165)/(86-94) 145/86  Resp:  [16-18] 16  SpO2:  [95 %-97 %] 96 %  O2 Device: None (Room air)      Physical Exam:  General: alert and oriented, uncomfortable appearing  CV: Well perfused, regular rate  Lungs: Normal work of breathing, no increased respiratory effort  Abdomen: full, distended, non-tender, no rebound or guarding  Extremities: No edema, clubbing or cyanosis  Skin: Warm, dry      Lab Results: I have reviewed the following results:  Recent Labs     03/08/25  1647 03/08/25 2009   WBC 9.21  --    HGB 18.5*  --    HCT 56.5*  --      --    SODIUM 144  --    K 4.4  --      --    CO2 30  --    BUN 15  --    CREATININE 1.02  --    GLUC 117  --    AST 21  --    ALT 23  --    ALB 5.1*  --    TBILI 0.66  --    ALKPHOS 58  --    HSTNI0 <2  --    LACTICACID  --  1.2       Imaging Results Review: I personally reviewed the following image studies in PACS and associated radiology reports: CT abdomen/pelvis. My interpretation of the radiology images/reports is: portal venous gas, ileus.  Other Study Results Review: No additional pertinent studies reviewed.    VTE Pharmacologic Prophylaxis: VTE covered by:  [START ON 3/9/2025] enoxaparin, Subcutaneous     VTE  Mechanical Prophylaxis: sequential compression device    Lenin Kent MD  General Surgery   03/08/25

## 2025-03-09 NOTE — ASSESSMENT & PLAN NOTE
- In setting of ileus, nausea, vomiting.   - 3/8 CT AP: Small amount of portal venous gas as well as multiple gas containing perigastric veins surrounding a mildly distended stomach. Multiple loops of mildly dilated fluid-filled small bowel throughout the abdomen and pelvis which taper gradually to normal caliber distally. Liquid stool is also noted within the colon.  - Admission to general surgery service, MS level of care  - NPO  - NGT to medium intermittent suction  - PRN antiemetics and analgesia. Scheduled tylenol.  - Etiology most likely 2/2 retching, increased abdominal pressure. Lactate normal. Abdomen distended, non-tender, no rebound or guarding. However, cannot rule out more serious etiologies at this time. Continue serial abdominal exams.   - IV Zosyn/Fluconazole  - Interval CT AP with oral contrast pending clinical progression  - BID Protonix  - DVT ppx with Lovenox  - SSI, maintain euglycemia  - Replete electrolytes for goal K>4, PO4>3, Mg>2  - Encourage OOB, ambulation

## 2025-03-10 ENCOUNTER — APPOINTMENT (INPATIENT)
Dept: CT IMAGING | Facility: HOSPITAL | Age: 50
DRG: 388 | End: 2025-03-10
Payer: COMMERCIAL

## 2025-03-10 PROBLEM — I81 PORTAL VEIN THROMBOSIS: Status: ACTIVE | Noted: 2025-03-10

## 2025-03-10 LAB
ALBUMIN SERPL BCG-MCNC: 3.7 G/DL (ref 3.5–5)
ALP SERPL-CCNC: 43 U/L (ref 34–104)
ALT SERPL W P-5'-P-CCNC: 16 U/L (ref 7–52)
ANION GAP SERPL CALCULATED.3IONS-SCNC: 7 MMOL/L (ref 4–13)
AST SERPL W P-5'-P-CCNC: 11 U/L (ref 13–39)
ATRIAL RATE: 102 BPM
BILIRUB DIRECT SERPL-MCNC: 0.14 MG/DL (ref 0–0.2)
BILIRUB SERPL-MCNC: 0.8 MG/DL (ref 0.2–1)
BUN SERPL-MCNC: 15 MG/DL (ref 5–25)
CALCIUM SERPL-MCNC: 8.1 MG/DL (ref 8.4–10.2)
CHLORIDE SERPL-SCNC: 108 MMOL/L (ref 96–108)
CO2 SERPL-SCNC: 29 MMOL/L (ref 21–32)
CREAT SERPL-MCNC: 0.96 MG/DL (ref 0.6–1.3)
ERYTHROCYTE [DISTWIDTH] IN BLOOD BY AUTOMATED COUNT: 12.7 % (ref 11.6–15.1)
GFR SERPL CREATININE-BSD FRML MDRD: 92 ML/MIN/1.73SQ M
GLUCOSE SERPL-MCNC: 117 MG/DL (ref 65–140)
GLUCOSE SERPL-MCNC: 59 MG/DL (ref 65–140)
GLUCOSE SERPL-MCNC: 67 MG/DL (ref 65–140)
GLUCOSE SERPL-MCNC: 71 MG/DL (ref 65–140)
GLUCOSE SERPL-MCNC: 78 MG/DL (ref 65–140)
GLUCOSE SERPL-MCNC: 79 MG/DL (ref 65–140)
GLUCOSE SERPL-MCNC: 82 MG/DL (ref 65–140)
GLUCOSE SERPL-MCNC: 84 MG/DL (ref 65–140)
HCT VFR BLD AUTO: 47.1 % (ref 36.5–49.3)
HGB BLD-MCNC: 15.1 G/DL (ref 12–17)
MAGNESIUM SERPL-MCNC: 2.3 MG/DL (ref 1.9–2.7)
MCH RBC QN AUTO: 28.7 PG (ref 26.8–34.3)
MCHC RBC AUTO-ENTMCNC: 32.1 G/DL (ref 31.4–37.4)
MCV RBC AUTO: 89 FL (ref 82–98)
P AXIS: 69 DEGREES
PHOSPHATE SERPL-MCNC: 2.4 MG/DL (ref 2.7–4.5)
PLATELET # BLD AUTO: 169 THOUSANDS/UL (ref 149–390)
PMV BLD AUTO: 10.7 FL (ref 8.9–12.7)
POTASSIUM SERPL-SCNC: 3.7 MMOL/L (ref 3.5–5.3)
PR INTERVAL: 150 MS
PROT SERPL-MCNC: 6 G/DL (ref 6.4–8.4)
QRS AXIS: 56 DEGREES
QRSD INTERVAL: 94 MS
QT INTERVAL: 334 MS
QTC INTERVAL: 435 MS
RBC # BLD AUTO: 5.27 MILLION/UL (ref 3.88–5.62)
SODIUM SERPL-SCNC: 144 MMOL/L (ref 135–147)
T WAVE AXIS: 21 DEGREES
VENTRICULAR RATE: 102 BPM
WBC # BLD AUTO: 7.92 THOUSAND/UL (ref 4.31–10.16)

## 2025-03-10 PROCEDURE — 93010 ELECTROCARDIOGRAM REPORT: CPT | Performed by: INTERNAL MEDICINE

## 2025-03-10 PROCEDURE — 74177 CT ABD & PELVIS W/CONTRAST: CPT

## 2025-03-10 PROCEDURE — 82948 REAGENT STRIP/BLOOD GLUCOSE: CPT

## 2025-03-10 PROCEDURE — 85027 COMPLETE CBC AUTOMATED: CPT

## 2025-03-10 PROCEDURE — 80048 BASIC METABOLIC PNL TOTAL CA: CPT

## 2025-03-10 PROCEDURE — 99232 SBSQ HOSP IP/OBS MODERATE 35: CPT | Performed by: SURGERY

## 2025-03-10 PROCEDURE — 99223 1ST HOSP IP/OBS HIGH 75: CPT | Performed by: INTERNAL MEDICINE

## 2025-03-10 PROCEDURE — 83735 ASSAY OF MAGNESIUM: CPT

## 2025-03-10 PROCEDURE — 84100 ASSAY OF PHOSPHORUS: CPT

## 2025-03-10 PROCEDURE — 80076 HEPATIC FUNCTION PANEL: CPT

## 2025-03-10 RX ORDER — ENOXAPARIN SODIUM 150 MG/ML
1.5 INJECTION SUBCUTANEOUS DAILY
Status: DISCONTINUED | OUTPATIENT
Start: 2025-03-11 | End: 2025-03-10

## 2025-03-10 RX ORDER — INSULIN LISPRO 100 [IU]/ML
1-6 INJECTION, SOLUTION INTRAVENOUS; SUBCUTANEOUS EVERY 6 HOURS SCHEDULED
Status: DISCONTINUED | OUTPATIENT
Start: 2025-03-10 | End: 2025-03-10

## 2025-03-10 RX ORDER — INSULIN LISPRO 100 [IU]/ML
1-6 INJECTION, SOLUTION INTRAVENOUS; SUBCUTANEOUS
Status: DISCONTINUED | OUTPATIENT
Start: 2025-03-10 | End: 2025-03-11 | Stop reason: HOSPADM

## 2025-03-10 RX ORDER — INSULIN LISPRO 100 [IU]/ML
1-6 INJECTION, SOLUTION INTRAVENOUS; SUBCUTANEOUS EVERY 6 HOURS SCHEDULED
Status: DISCONTINUED | OUTPATIENT
Start: 2025-03-11 | End: 2025-03-10

## 2025-03-10 RX ORDER — ENOXAPARIN SODIUM 100 MG/ML
1 INJECTION SUBCUTANEOUS EVERY 12 HOURS SCHEDULED
Status: DISCONTINUED | OUTPATIENT
Start: 2025-03-10 | End: 2025-03-11 | Stop reason: HOSPADM

## 2025-03-10 RX ORDER — DEXTROSE MONOHYDRATE 25 G/50ML
12.5 INJECTION, SOLUTION INTRAVENOUS ONCE
Status: DISCONTINUED | OUTPATIENT
Start: 2025-03-10 | End: 2025-03-11 | Stop reason: HOSPADM

## 2025-03-10 RX ADMIN — ENOXAPARIN SODIUM 100 MG: 100 INJECTION SUBCUTANEOUS at 21:31

## 2025-03-10 RX ADMIN — ACETAMINOPHEN 1000 MG: 10 INJECTION INTRAVENOUS at 10:11

## 2025-03-10 RX ADMIN — SODIUM CHLORIDE, SODIUM ACETATE ANHYDROUS, SODIUM GLUCONATE, POTASSIUM CHLORIDE, AND MAGNESIUM CHLORIDE 125 ML/HR: 526; 222; 502; 37; 30 INJECTION, SOLUTION INTRAVENOUS at 23:51

## 2025-03-10 RX ADMIN — IOHEXOL 100 ML: 350 INJECTION, SOLUTION INTRAVENOUS at 11:14

## 2025-03-10 RX ADMIN — SODIUM CHLORIDE, SODIUM ACETATE ANHYDROUS, SODIUM GLUCONATE, POTASSIUM CHLORIDE, AND MAGNESIUM CHLORIDE 125 ML/HR: 526; 222; 502; 37; 30 INJECTION, SOLUTION INTRAVENOUS at 14:20

## 2025-03-10 RX ADMIN — HYDROMORPHONE HYDROCHLORIDE 0.2 MG: 0.2 INJECTION, SOLUTION INTRAMUSCULAR; INTRAVENOUS; SUBCUTANEOUS at 22:25

## 2025-03-10 RX ADMIN — FLUCONAZOLE 400 MG: 400 INJECTION, SOLUTION INTRAVENOUS at 21:31

## 2025-03-10 RX ADMIN — PIPERACILLIN SODIUM AND TAZOBACTAM SODIUM 4.5 G: 36; 4.5 INJECTION, POWDER, LYOPHILIZED, FOR SOLUTION INTRAVENOUS at 04:12

## 2025-03-10 RX ADMIN — PIPERACILLIN SODIUM AND TAZOBACTAM SODIUM 4.5 G: 36; 4.5 INJECTION, POWDER, LYOPHILIZED, FOR SOLUTION INTRAVENOUS at 13:02

## 2025-03-10 RX ADMIN — IOHEXOL 50 ML: 240 INJECTION, SOLUTION INTRATHECAL; INTRAVASCULAR; INTRAVENOUS; ORAL at 11:14

## 2025-03-10 RX ADMIN — ENOXAPARIN SODIUM 40 MG: 40 INJECTION SUBCUTANEOUS at 09:09

## 2025-03-10 RX ADMIN — PANTOPRAZOLE SODIUM 40 MG: 40 INJECTION, POWDER, LYOPHILIZED, FOR SOLUTION INTRAVENOUS at 21:32

## 2025-03-10 RX ADMIN — SODIUM CHLORIDE, SODIUM ACETATE ANHYDROUS, SODIUM GLUCONATE, POTASSIUM CHLORIDE, AND MAGNESIUM CHLORIDE 125 ML/HR: 526; 222; 502; 37; 30 INJECTION, SOLUTION INTRAVENOUS at 05:30

## 2025-03-10 RX ADMIN — PIPERACILLIN SODIUM AND TAZOBACTAM SODIUM 4.5 G: 36; 4.5 INJECTION, POWDER, LYOPHILIZED, FOR SOLUTION INTRAVENOUS at 20:39

## 2025-03-10 RX ADMIN — ACETAMINOPHEN 1000 MG: 10 INJECTION INTRAVENOUS at 04:12

## 2025-03-10 RX ADMIN — PANTOPRAZOLE SODIUM 40 MG: 40 INJECTION, POWDER, LYOPHILIZED, FOR SOLUTION INTRAVENOUS at 09:09

## 2025-03-10 NOTE — ASSESSMENT & PLAN NOTE
- In setting of ileus, nausea, vomiting.   - 3/8 CT AP: Small amount of portal venous gas as well as multiple gas containing perigastric veins surrounding a mildly distended stomach. Multiple loops of mildly dilated fluid-filled small bowel throughout the abdomen and pelvis which taper gradually to normal caliber distally. Liquid stool is also noted within the colon.  - Etiology most likely 2/2 retching, increased abdominal pressure. Lactate normal. Abdomen distended, non-tender, no rebound or guarding. However, cannot rule out more serious etiologies at this time.    Plan  - NPO  -CT A/P with IV and p.o. contrast    -Oral contrast via NG tube, clamp  - PRN antiemetics and analgesia. Scheduled tylenol.  - IV Zosyn/Fluconazole  - BID Protonix  - DVT ppx with Lovenox  - SSI, maintain euglycemia  - Replete electrolytes for goal K>4, PO4>3, Mg>2  - Encourage OOB, ambulation

## 2025-03-10 NOTE — PROGRESS NOTES
Progress Note - Surgery-General   Name: Iam Delgado 49 y.o. male I MRN: [id number]  Unit/Bed#: S -01 I Date of Admission: 3/8/2025   Date of Service: 3/10/2025 I Hospital Day: 2     Assessment & Plan  Portal Venous Gas  - In setting of ileus, nausea, vomiting.   - 3/8 CT AP: Small amount of portal venous gas as well as multiple gas containing perigastric veins surrounding a mildly distended stomach. Multiple loops of mildly dilated fluid-filled small bowel throughout the abdomen and pelvis which taper gradually to normal caliber distally. Liquid stool is also noted within the colon.  - Etiology most likely 2/2 retching, increased abdominal pressure. Lactate normal. Abdomen distended, non-tender, no rebound or guarding. However, cannot rule out more serious etiologies at this time.    Plan  - NPO  -CT A/P with IV and p.o. contrast    -Oral contrast via NG tube, clamp  - PRN antiemetics and analgesia. Scheduled tylenol.  - IV Zosyn/Fluconazole  - BID Protonix  - DVT ppx with Lovenox  - SSI, maintain euglycemia  - Replete electrolytes for goal K>4, PO4>3, Mg>2  - Encourage OOB, ambulation  Ileus (HCC)  - With associated nausea, vomiting. In setting of Flu and recent Mounjaro use.  - Replete electrolytes for goal K>4, PO4>3, Mg>2  - See plan above      Subjective   Patient seen examined at bedside.  Patient reports feeling well overall today.  No abdominal pain.  No nausea and vomiting.  No bowel function.    Objective :  Temp:  [97.4 °F (36.3 °C)-98.3 °F (36.8 °C)] 98.2 °F (36.8 °C)  HR:  [72-82] 80  BP: (109-128)/(75-84) 118/79  Resp:  [16-18] 18  SpO2:  [92 %-94 %] 94 %  O2 Device: None (Room air)    I/O         03/08 0701 03/09 0700 03/09 0701  03/10 0700 03/10 0701 03/11 0700    P.O.  780 0    I.V. (mL/kg)  2741.7 (27.5)     Total Intake(mL/kg)  3521.7 (35.3) 0 (0)    Urine (mL/kg/hr)  800 (0.3) 800 (2.4)    Emesis/NG output 450 1900     Total Output 450 2700 800    Net -450 +821.7 -800                  Lines/Drains/Airways       Active Status       Name Placement date Placement time Site Days    NG/OG/Enteral Tube Nasogastric 18 Fr Right nare 03/08/25 2124  Right nare  1                  Physical Exam  Vitals and nursing note reviewed.   Constitutional:       General: He is not in acute distress.     Appearance: Normal appearance. He is normal weight. He is not ill-appearing or toxic-appearing.   HENT:      Head: Normocephalic and atraumatic.   Eyes:      General: No scleral icterus.  Cardiovascular:      Rate and Rhythm: Normal rate.   Pulmonary:      Effort: Pulmonary effort is normal. No respiratory distress.   Abdominal:      General: Abdomen is flat. There is no distension.      Palpations: Abdomen is soft.      Tenderness: There is no abdominal tenderness. There is no guarding.   Musculoskeletal:      Right lower leg: No edema.      Left lower leg: No edema.   Skin:     General: Skin is warm.   Neurological:      Mental Status: He is alert and oriented to person, place, and time.   Psychiatric:         Mood and Affect: Mood normal.         Lab Results: I have reviewed the following results:  Recent Labs     03/08/25  1647 03/08/25 2009 03/09/25  0335 03/10/25  0506   WBC 9.21  --    < > 7.92   HGB 18.5*  --    < > 15.1   HCT 56.5*  --    < > 47.1     --    < > 169   SODIUM 144  --    < > 144   K 4.4  --    < > 3.7     --    < > 108   CO2 30  --    < > 29   BUN 15  --    < > 15   CREATININE 1.02  --    < > 0.96   GLUC 117  --    < > 71   MG  --   --    < > 2.3   PHOS  --   --    < > 2.4*   AST 21  --   --   --    ALT 23  --   --   --    ALB 5.1*  --   --   --    TBILI 0.66  --   --   --    ALKPHOS 58  --   --   --    HSTNI0 <2  --   --   --    LACTICACID  --  1.2  --   --     < > = values in this interval not displayed.       Imaging Results Review: No pertinent imaging studies reviewed.  Other Study Results Review: No additional pertinent studies reviewed.    VTE Pharmacologic Prophylaxis:  VTE covered by:  enoxaparin, Subcutaneous, 40 mg at 03/10/25 0996     VTE Mechanical Prophylaxis: sequential compression device

## 2025-03-10 NOTE — CONSULTS
Consultation - Oncology-Medical   Name: Iam Delgado 49 y.o. male I MRN: [id number]  Unit/Bed#: S -01 I Date of Admission: 3/8/2025   Date of Service: 3/10/2025 I Hospital Day: 2   Inpatient consult to Hematology  Consult performed by: Jerman Hurley DO  Consult ordered by: Donta Cole MD        Physician Requesting Evaluation: Alejandro Mejia MD   Reason for Evaluation / Principal Problem: Portal Vein thrombosis    Assessment & Plan  Ileus (HCC)        Portal vein thrombosis      49-year-old with uncontrolled type 2 diabetes A1c 10.1, hyperlipidemia, history of Bell's palsy presented with abdominal distention associated with nausea/vomiting in setting of ileus managed conservatively with repeat CT given high output from NG tube with repeat scan showing new thrombus in left portal vein with hematology consultation for recommendations on further investigation.    Etiology in the setting of inflammation, no indication for genetic workup at this time    Recommendations:  -Recommend anticoagulation Lovenox 1 mg/kilogram every 12 hours versus heparin infusion  and then can transition to DOAC once stable from surgical standpoint  -Will need outpatient follow-up, will need anticoagulation for at least 3 months with repeat ultrasound and D-dimer  -Will check  given A1C and thrombus imaging no signs of pancreatic mass low suspicion      History of Present Illness     49-year-old male with past medical history significant for type 2 diabetes, A1c 10.1 from 12/20/2024, hyperlipidemia, history of Bell's palsy right sided who presented to the ED on 3/8/2025 with chief complaint of nausea/vomiting/abdominal distention with CT  abdomen and pelvis showing small amount of portal venous gas as well as multiple gas containing perigastric vein surrounding a mildly distended stomach also multiple loops of mildly dilated fluid-filled small bowel with general surgery consultation with concerns for ileus  managed conservatively however persistent high output from NG tube requiring repeat CT scan performed 3/10/2025 showing persistent portal vein gas with now new thrombus in left portal vein and resolution of venous gas in gastroepiploic vein.    Surgical consultation for evaluation for hypercoagulability workup and or possible antithrombolytic therapy.    Patient seen at bedside, denying any previous clot history or family history of clotting history. He follows has been on GPL1 inhibitor for approximately 4 to 6 weeks.  No additional illnesses noted per patient.      Review of Systems   All other systems reviewed and are negative.      Oncology History:   Cancer Staging   No matching staging information was found for the patient.    Oncology History    No history exists.     Current Facility-Administered Medications   Medication Dose Route Frequency Provider Last Rate Last Admin    acetaminophen (Ofirmev) injection 1,000 mg  1,000 mg Intravenous Q6H [facility] Lenin Kent  mL/hr at 03/10/25 1011 1,000 mg at 03/10/25 1011    dextrose 50 % IV solution 12.5 g  12.5 g Intravenous Once Alejandro Mejia MD        enoxaparin (LOVENOX) subcutaneous injection 40 mg  40 mg Subcutaneous Daily Lenin Kent MD   40 mg at 03/10/25 0909    fluconazole (DIFLUCAN) IVPB (premix) 400 mg 200 mL  400 mg Intravenous Q24H Lenin Kent  mL/hr at 03/09/25 2241 400 mg at 03/09/25 2241    HYDROmorphone (DILAUDID) injection 0.5 mg  0.5 mg Intravenous Q4H PRN Lenin Kent MD        HYDROmorphone HCl (DILAUDID) injection 0.2 mg  0.2 mg Intravenous Q3H PRN Lenin Kent MD   0.2 mg at 03/09/25 2155    insulin lispro (HumALOG/ADMELOG) 100 units/mL subcutaneous injection 1-6 Units  1-6 Units Subcutaneous Q6H [facility] Lenin Kent MD        multi-electrolyte (Plasmalyte-A/Isolyte-S PH 7.4/Normosol-R) IV solution  125 mL/hr Intravenous Continuous Lenin  Valerio Kent  mL/hr at 03/10/25 1420 125 mL/hr at 03/10/25 1420    ondansetron (ZOFRAN) injection 4 mg  4 mg Intravenous Q6H PRN Lenin Kent MD   4 mg at 03/08/25 2113    pantoprazole (PROTONIX) injection 40 mg  40 mg Intravenous Q12H [facility] Lenin Kent MD   40 mg at 03/10/25 0909    phenol (CHLORASEPTIC) 1.4 % mucosal liquid 1 spray  1 spray Mouth/Throat Q2H PRN Lenin Kent MD        piperacillin-tazobactam (ZOSYN) IVPB (EXTENDED INFUSION) 4.5 g  4.5 g Intravenous Q8H Lenin Kent MD   4.5 g at 03/10/25 1302    promethazine (PHENERGAN) injection 25 mg  25 mg Intramuscular Q6H PRN Lenin Kent MD           Objective :  Temp:  [97.4 °F (36.3 °C)-98.3 °F (36.8 °C)] 98 °F (36.7 °C)  HR:  [72-82] 79  BP: (109-131)/(75-90) 131/90  Resp:  [16-18] 18  SpO2:  [92 %-95 %] 95 %  O2 Device: None (Room air)    Physical Exam  Constitutional:       General: He is not in acute distress.     Appearance: He is obese.   HENT:      Head: Normocephalic and atraumatic.      Nose: Nose normal.   Eyes:      Extraocular Movements: Extraocular movements intact.      Pupils: Pupils are equal, round, and reactive to light.   Cardiovascular:      Rate and Rhythm: Normal rate and regular rhythm.   Pulmonary:      Effort: Pulmonary effort is normal.   Abdominal:      Palpations: Abdomen is soft.   Musculoskeletal:         General: Normal range of motion.      Cervical back: Normal range of motion.   Skin:     General: Skin is warm.   Neurological:      General: No focal deficit present.   Psychiatric:         Mood and Affect: Mood normal.         Lab Results: I have reviewed the following results:  Lab Results   Component Value Date     06/09/2017    K 3.7 03/10/2025     03/10/2025    CO2 29 03/10/2025    BUN 15 03/10/2025    CREATININE 0.96 03/10/2025    GLUCOSE 162 (H) 06/09/2017    GLUF 135 (H) 03/11/2024    CALCIUM 8.1 (L) 03/10/2025    AST 11  (L) 03/10/2025    ALT 16 03/10/2025    ALKPHOS 43 03/10/2025    PROT 6.8 06/09/2017    BILITOT <0.2 06/09/2017    EGFR 92 03/10/2025     Lab Results   Component Value Date    WBC 7.92 03/10/2025    HGB 15.1 03/10/2025    HCT 47.1 03/10/2025    MCV 89 03/10/2025     03/10/2025     Lab Results   Component Value Date    NEUTROABS 8.11 (H) 03/09/2025     Radiology:    CT abdomen/pelvis with contrast 3/10/2025  IMPRESSION:  Persistent portal venous gas, with new thrombus in the left portal vein. Resolution of venous gas in the gastroepiploic vein. No pneumatosis. No extravasation of oral contrast.    CT abdomen and pelvis with contrast 3/8/2025  IMPRESSION:     Small amount of portal venous gas as well as multiple gas containing perigastric veins surrounding a mildly distended stomach. These findings are nonspecific and has been found with vomiting induced elevated intragastric pressure with air spreading to   the portal venous system. However, other etiologies such as ischemia, emphysematous gastritis or other mucosal injury cannot be excluded. Surgical consultation is recommended.     In addition, there are multiple loops of mildly dilated fluid-filled small bowel throughout the abdomen and pelvis which taper gradually to normal caliber distally. Liquid stool is also noted within the colon. Differential considerations include a   gastroenteritis versus early/partial small bowel obstruction versus ileus. A repeat CT abdomen/pelvis after the administration of oral contrast is recommended for further evaluation.     No free air or free fluid.      Jerman Hurley DO   PGY-4 Hematology.Oncology Fellow

## 2025-03-10 NOTE — UTILIZATION REVIEW
Continued Stay Review    Date: 3/10/25                           Current Patient Class: Inpatient  Current Level of Care: MS    HPI:49 y.o. male initially admitted on 3/8/25    Current Diagnosis:Portal venous gas, unclear etiology . Ileus, N/V.     Assessment/Plan: 3/10  Feeling much better today. Denies any abdominal pain. Abdomen soft., nontender,  nondistended.No bowel function .  NG tube does have significant output however he does admit to multiple glasses of chips per day .  Pt had repeat CT scan today with IV and p.o. contrast. Oral contrast via NG tube, clamp  . Based on results, may discontinue NG tube and start clear liquid diet . Continue IV Unasyn for now . IVF infusing . IV PPI . Obtain LFT's . Consult to hematology today- Left PVT in the setting of portal venous gas         Medications:   Scheduled Medications:  acetaminophen, 1,000 mg, Intravenous, Q6H JULISA  enoxaparin, 40 mg, Subcutaneous, Daily  fluconazole, 400 mg, Intravenous, Q24H  insulin lispro, 1-6 Units, Subcutaneous, Q6H JULISA  pantoprazole, 40 mg, Intravenous, Q12H JULISA  piperacillin-tazobactam, 4.5 g, Intravenous, Q8H      Continuous IV Infusions:  multi-electrolyte, 125 mL/hr, Intravenous, Continuous      PRN Meds:  HYDROmorphone, 0.5 mg, Intravenous, Q4H PRN  HYDROmorphone, 0.2 mg, Intravenous, Q3H PRN  ondansetron, 4 mg, Intravenous, Q6H PRN  phenol, 1 spray, Mouth/Throat, Q2H PRN  promethazine, 25 mg, Intramuscular, Q6H PRN      Discharge Plan: TBD    Vital Signs (last 3 days)       Date/Time Temp Pulse Resp BP MAP (mmHg) SpO2 Calculated FIO2 (%) - Nasal Cannula Nasal Cannula O2 Flow Rate (L/min) O2 Device Patient Position - Orthostatic VS Pain    03/10/25 12:13:22 98 °F (36.7 °C) 79 18 131/90 104 95 % -- -- -- -- --    03/10/25 07:50:59 98.2 °F (36.8 °C) 80 18 118/79 92 94 % -- -- -- -- --    03/09/25 23:47:31 97.4 °F (36.3 °C) 72 -- 109/75 86 94 % -- -- -- -- --    03/09/25 0365 -- -- -- -- -- -- -- -- -- -- 5    03/09/25 15:27:06 98.3  °F (36.8 °C) 82 16 128/84 99 92 % -- -- None (Room air) Sitting No Pain    03/09/25 1100 -- -- -- -- -- -- -- -- None (Room air) -- --    03/09/25 07:08:58 98.9 °F (37.2 °C) 86 15 133/86 102 93 % -- -- -- -- --    03/09/25 0316 -- -- -- -- -- -- -- -- -- -- 5    03/08/25 2232 -- -- -- -- -- -- -- -- -- -- No Pain    03/08/25 22:26:12 99.1 °F (37.3 °C) 104 24 150/93 112 89 % -- -- -- -- --    03/08/25 2115 -- 96 16 128/79 98 96 % 32 3 L/min Nasal cannula Lying --    03/08/25 2029 -- 103 16 145/86 107 96 % -- -- None (Room air) Lying 7    03/08/25 1845 -- 111 16 152/88 112 95 % -- -- None (Room air) Sitting --    03/08/25 1630 -- -- -- -- -- -- -- -- None (Room air) -- --    03/08/25 1622 98.4 °F (36.9 °C) 104 18 165/94 121 97 % -- -- None (Room air) Sitting 7          Weight (last 2 days)       Date/Time Weight    03/08/25 2232 99.8 (220)            Pertinent Labs/Diagnostic Results:   Radiology:  CT abdomen pelvis w contrast   Final Interpretation by Kenzie Burk MD (03/10 1134)      Persistent portal venous gas, with new thrombus in the left portal vein. Resolution of venous gas in the gastroepiploic vein. No pneumatosis. No extravasation of oral contrast.      The study was marked in EPIC for immediate notification.         Workstation performed: [id number]              Cardiology:  ECG 12 lead   Final Result by Nirmal Kapoor MD (03/10 0859)   Sinus tachycardia   Incomplete right bundle branch block   Cannot rule out Anterior infarct , age undetermined   Abnormal ECG   No previous ECGs available   Confirmed by Nirmal Kapoor ([id number]) on 3/10/2025 8:59:29 AM              Results from last 7 days   Lab Units 03/10/25  0506 03/09/25 0335 03/08/25  1647   WBC Thousand/uL 7.92 10.57* 9.21   HEMOGLOBIN g/dL 15.1 16.5 18.5*   HEMATOCRIT % 47.1 50.2* 56.5*   PLATELETS Thousands/uL 169 211 182   TOTAL NEUT ABS Thousands/µL  --  8.11* 6.99         Results from last 7 days   Lab Units 03/10/25  0506 03/09/25 0335  03/08/25  1647   SODIUM mmol/L 144 146 144   POTASSIUM mmol/L 3.7 3.8 4.4   CHLORIDE mmol/L 108 108 104   CO2 mmol/L 29 27 30   ANION GAP mmol/L 7 11 10   BUN mg/dL 15 18 15   CREATININE mg/dL 0.96 0.95 1.02   EGFR ml/min/1.73sq m 92 93 85   CALCIUM mg/dL 8.1* 9.1 10.5*   MAGNESIUM mg/dL 2.3 1.9  --    PHOSPHORUS mg/dL 2.4* 4.2  --      Results from last 7 days   Lab Units 03/08/25  1647   AST U/L 21   ALT U/L 23   ALK PHOS U/L 58   TOTAL PROTEIN g/dL 8.5*   ALBUMIN g/dL 5.1*   TOTAL BILIRUBIN mg/dL 0.66     Results from last 7 days   Lab Units 03/10/25  1212 03/10/25  0547 03/09/25  2350 03/09/25  2000 03/09/25  1820 03/09/25  1152 03/09/25  0537 03/08/25  2351   POC GLUCOSE mg/dl 82 78 79 159* 76 104 126 132     Results from last 7 days   Lab Units 03/10/25  0506 03/09/25  0335 03/08/25  1647   GLUCOSE RANDOM mg/dL 71 132 117               Results from last 7 days   Lab Units 03/08/25  1647   HS TNI 0HR ng/L <2                     Results from last 7 days   Lab Units 03/08/25  2009   LACTIC ACID mmol/L 1.2               Results from last 7 days   Lab Units 03/08/25  1647   LIPASE u/L 36                 Results from last 7 days   Lab Units 03/08/25  1852   CLARITY UA  Clear   COLOR UA  Light Yellow   SPEC GRAV UA  1.032*   PH UA  5.5   GLUCOSE UA mg/dl >=1000 (1%)*   KETONES UA mg/dl Negative   BLOOD UA  Negative   PROTEIN UA mg/dl Negative   NITRITE UA  Negative   BILIRUBIN UA  Negative   UROBILINOGEN UA (BE) mg/dl <2.0   LEUKOCYTES UA  Elevated glucose may cause decreased leukocyte values. See urine microscopic for UWBC result*   WBC UA /hpf None Seen   RBC UA /hpf 1-2   BACTERIA UA /hpf None Seen   EPITHELIAL CELLS WET PREP /hpf None Seen   MUCUS THREADS  Occasional*                         Network Utilization Review Department  ATTENTION: Please call with any questions or concerns to [phone] and carefully listen to the prompts so that you are directed to the right person. All voicemails are confidential.    For Discharge needs, contact Care Management DC Support Team at [phone] opt. [phone]  Send all requests for admission clinical reviews, approved or denied determinations and any other requests to dedicated fax number below belonging to the campus where the patient is receiving treatment. List of dedicated fax numbers for the Facilities:  FACILITY NAME UR FAX NUMBER   ADMISSION DENIALS (Administrative/Medical Necessity) [phone]   DISCHARGE SUPPORT TEAM (NETWORK) [phone]   PARENT CHILD HEALTH (Maternity/NICU/Pediatrics) [phone]   [facility] [phone]   [facility] [phone]   Critical access hospital [phone]   [facility] [phone]   [facility] [phone]   [facility] [phone]   [facility] [phone]   [facility] [phone]   [facility] [phone]   Critical access hospital [phone]   [facility] [phone]   [facility] [phone]

## 2025-03-10 NOTE — ASSESSMENT & PLAN NOTE
49-year-old with uncontrolled type 2 diabetes A1c 10.1, hyperlipidemia, history of Bell's palsy presented with abdominal distention associated with nausea/vomiting in setting of ileus managed conservatively with repeat CT given high output from NG tube with repeat scan showing new thrombus in left portal vein with hematology consultation for recommendations on further investigation.    Etiology in the setting of inflammation, no indication for genetic workup at this time    Recommendations:  -Recommend anticoagulation Lovenox 1 mg/kilogram every 12 hours versus heparin infusion  and then can transition to DOAC once stable from surgical standpoint  -Will need outpatient follow-up, will need anticoagulation for at least 3 months with repeat ultrasound and D-dimer  -Will check  given A1C and thrombus imaging no signs of pancreatic mass low suspicion

## 2025-03-10 NOTE — UTILIZATION REVIEW
Initial Clinical Review    Admission: Date/Time/Statement:   Admission Orders (From admission, onward)       Ordered        03/08/25 2055  Inpatient Admission  Once                          Orders Placed This Encounter   Procedures    Inpatient Admission     Standing Status:   Standing     Number of Occurrences:   1     Level of Care:   Med Surg [16]     Estimated length of stay:   More than 2 Midnights     Certification:   I certify that inpatient services are medically necessary for this patient for a duration of greater than two midnights. See H&P and MD Progress Notes for additional information about the patient's course of treatment.     ED Arrival Information       Expected   -    Arrival   3/8/2025 16:17    Acuity   Urgent              Means of arrival   Walk-In    Escorted by   Spouse    Service   Surgery-General    Admission type   Emergency              Arrival complaint   Abdominal pain/Bloating             Chief Complaint   Patient presents with    Abdominal Pain     Pt reports bloating overnight, passing gas and burping, worsening, states feels like fluid in stomach, worsening pain       Initial Presentation: 49 y.o. male with hx DM2, HLD, and GERD who  presents to ED from home  with one day of nausea, abdominal pain, bloating .  He is passing flatus and having bowel movements . On exam, abdomen is distended, non-tender, without rebound or guarding.  Labs : lactate of 1.2. WBC of 9.2. Creatinine of 1.02.H/H elevated .  Flu B + . CT A/P was obtained which found small amount of portal venous gas as well as multiple gas containing perigastric veins surrounding a mildly distended stomach. Multiple loops of mildly dilated fluid-filled small bowel throughout the abdomen and pelvis which taper gradually to normal caliber distally. Liquid stool is also noted within the colon. Pt given IVF, IV antiemetics, IV Pepcid, IV analgesic in ED. Pt admitted as Inpatient by general sx service with portal vein gas in  setting of ileus, nausea, vomiting. Ileus in setting of flu and recent Mounjaro use . Plan - NPO. NGT to MIS . Pain control. PRN antiemetics . Serial abdominal exams. IV Zosyn/Fluconazole  . Interval CT AP with oral contrast pending clinical progression . BID protonix. Replete electrolytes for goal K>4, PO4>3, Mg>2 . OOB ambulation .         Date: 3/9    Day 2:    No acute events overnight. Afebrile, hemodynamically stable. No nausea, or vomiting, fevers or chills. IVF infusing .  NPO . NGT with 1900 ml output last 24 hrs . K 3.8, Mag 1.9- repletion ordered . . PLan for interval CT A/P tomorrow . Continue IV Zosyn/Fluconazole . AM labs .           ED Treatment-Medication Administration from 03/08/2025 1617 to 03/08/2025 2213         Date/Time Order Dose Route Action     03/08/2025 1650 sodium chloride 0.9 % bolus 1,000 mL 1,000 mL Intravenous New Bag     03/08/2025 1650 Famotidine (PF) (PEPCID) injection 20 mg 20 mg Intravenous Given     03/08/2025 1649 metoclopramide (REGLAN) injection 10 mg 10 mg Intravenous Given     03/08/2025 1753 iohexol (OMNIPAQUE) 350 MG/ML injection (MULTI-DOSE) 80 mL 80 mL Intravenous Given     03/08/2025 1840 droperidol (INAPSINE) injection 1.25 mg 1.25 mg Intravenous Given     03/08/2025 2031 HYDROmorphone (DILAUDID) injection 0.5 mg 0.5 mg Intravenous Given     03/08/2025 2105 lidocaine (URO-JET) 2 % urethral/mucosal gel 1 Application 1 Application Urethral Given     03/08/2025 2105 benzocaine (HURRICAINE) 20 % mucosal spray 2 spray 2 spray Mucosal Given     03/08/2025 2103 LORazepam (ATIVAN) injection 1 mg 1 mg Intravenous Given     03/08/2025 2113 ondansetron (ZOFRAN) injection 4 mg 4 mg Intravenous Given     03/08/2025 2135 piperacillin-tazobactam (ZOSYN) IVPB 4.5 g 4.5 g Intravenous New Bag            Scheduled Medications:  acetaminophen, 1,000 mg, Intravenous, Q6H JULISA  enoxaparin, 40 mg, Subcutaneous, Daily  fluconazole, 400 mg, Intravenous, Q24H  insulin lispro, 1-6 Units,  Subcutaneous, Q6H JULISA  pantoprazole, 40 mg, Intravenous, Q12H JULISA  piperacillin-tazobactam, 4.5 g, Intravenous, Q8H    potassium chloride 20 mEq IVPB (premix)  Dose: 20 mEq  Freq: Once Route: IV  Last Dose: Stopped (03/09/25 1348)  Start: 03/09/25 0800 End: 03/09/25 1348  magnesium sulfate 2 g/50 mL IVPB (premix) 2 g  Dose: 2 g  Freq: Once Route: IV  Last Dose: Stopped (03/09/25 1347)  Start: 03/09/25 0800 End: 03/09/25 1348  dextrose 50 % IV solution 25 g  Dose: 25 g  Freq: Once Route: IV  Start: 03/09/25 1915 End: 03/09/25 1918    Continuous IV Infusions:  multi-electrolyte, 125 mL/hr, Intravenous, Continuous      PRN Meds:  HYDROmorphone, 0.5 mg, Intravenous, Q4H PRN  HYDROmorphone, 0.2 mg, Intravenous, Q3H PRN x2 3/9   ondansetron, 4 mg, Intravenous, Q6H PRN  phenol, 1 spray, Mouth/Throat, Q2H PRN  promethazine, 25 mg, Intramuscular, Q6H PRN      ED Triage Vitals [03/08/25 1622]   Temperature Pulse Respirations Blood Pressure SpO2 Pain Score   98.4 °F (36.9 °C) 104 18 165/94 97 % 7     Weight (last 2 days)       Date/Time Weight    03/08/25 2232 99.8 (220)            Vital Signs (last 3 days)       Date/Time Temp Pulse Resp BP MAP (mmHg) SpO2 Calculated FIO2 (%) - Nasal Cannula Nasal Cannula O2 Flow Rate (L/min) O2 Device Patient Position - Orthostatic VS Pain    03/10/25 07:50:59 98.2 °F (36.8 °C) 80 18 118/79 92 94 % -- -- -- -- --    03/09/25 23:47:31 97.4 °F (36.3 °C) 72 -- 109/75 86 94 % -- -- -- -- --    03/09/25 2155 -- -- -- -- -- -- -- -- -- -- 5    03/09/25 15:27:06 98.3 °F (36.8 °C) 82 16 128/84 99 92 % -- -- None (Room air) Sitting No Pain    03/09/25 1100 -- -- -- -- -- -- -- -- None (Room air) -- --    03/09/25 07:08:58 98.9 °F (37.2 °C) 86 15 133/86 102 93 % -- -- -- -- --    03/09/25 0316 -- -- -- -- -- -- -- -- -- -- 5    03/08/25 2232 -- -- -- -- -- -- -- -- -- -- No Pain    03/08/25 22:26:12 99.1 °F (37.3 °C) 104 24 150/93 112 89 % -- -- -- -- --    03/08/25 2115 -- 96 16 128/79 98 96 % 32 3  L/min Nasal cannula Lying --    03/08/25 2029 -- 103 16 145/86 107 96 % -- -- None (Room air) Lying 7    03/08/25 1845 -- 111 16 152/88 112 95 % -- -- None (Room air) Sitting --    03/08/25 1630 -- -- -- -- -- -- -- -- None (Room air) -- --    03/08/25 1622 98.4 °F (36.9 °C) 104 18 165/94 121 97 % -- -- None (Room air) Sitting 7              Pertinent Labs/Diagnostic Test Results:   Radiology:  XR chest portable   Final Interpretation by Dione Andres MD (03/09 0806)      No acute cardiopulmonary disease.      NG tube in stomach with gastric distention.            Workstation performed: [id number]         CT abdomen pelvis with contrast   Final Interpretation by Poncho Bacon MD (03/08 1958)      Small amount of portal venous gas as well as multiple gas containing perigastric veins surrounding a mildly distended stomach. These findings are nonspecific and has been found with vomiting induced elevated intragastric pressure with air spreading to    the portal venous system. However, other etiologies such as ischemia, emphysematous gastritis or other mucosal injury cannot be excluded. Surgical consultation is recommended.      In addition, there are multiple loops of mildly dilated fluid-filled small bowel throughout the abdomen and pelvis which taper gradually to normal caliber distally. Liquid stool is also noted within the colon. Differential considerations include a    gastroenteritis versus early/partial small bowel obstruction versus ileus. A repeat CT abdomen/pelvis after the administration of oral contrast is recommended for further evaluation.      No free air or free fluid.                  I personally discussed this study with COURTNEY MCNEAL on 3/8/2025 7:54 PM.            Workstation performed: [id number]         XR chest 1 view portable   ED Interpretation by Jessica Palacios MD (03/08 1725)   No acute cardiopulmonary process noted.      Final Interpretation by Dione Andres MD  (03/09 0805)      No acute cardiopulmonary disease.            Workstation performed: [id number]         CT abdomen pelvis w contrast    (Results Pending)             Results from last 7 days   Lab Units 03/10/25  0506 03/09/25  0335 03/08/25  1647   WBC Thousand/uL 7.92 10.57* 9.21   HEMOGLOBIN g/dL 15.1 16.5 18.5*   HEMATOCRIT % 47.1 50.2* 56.5*   PLATELETS Thousands/uL 169 211 182   TOTAL NEUT ABS Thousands/µL  --  8.11* 6.99         Results from last 7 days   Lab Units 03/10/25  0506 03/09/25  0335 03/08/25  1647   SODIUM mmol/L 144 146 144   POTASSIUM mmol/L 3.7 3.8 4.4   CHLORIDE mmol/L 108 108 104   CO2 mmol/L 29 27 30   ANION GAP mmol/L 7 11 10   BUN mg/dL 15 18 15   CREATININE mg/dL 0.96 0.95 1.02   EGFR ml/min/1.73sq m 92 93 85   CALCIUM mg/dL 8.1* 9.1 10.5*   MAGNESIUM mg/dL 2.3 1.9  --    PHOSPHORUS mg/dL 2.4* 4.2  --      Results from last 7 days   Lab Units 03/08/25  1647   AST U/L 21   ALT U/L 23   ALK PHOS U/L 58   TOTAL PROTEIN g/dL 8.5*   ALBUMIN g/dL 5.1*   TOTAL BILIRUBIN mg/dL 0.66     Results from last 7 days   Lab Units 03/10/25  0547 03/09/25  2350 03/09/25  2000 03/09/25  1820 03/09/25  1152 03/09/25  0537 03/08/25  2351   POC GLUCOSE mg/dl 78 79 159* 76 104 126 132     Results from last 7 days   Lab Units 03/10/25  0506 03/09/25  0335 03/08/25  1647   GLUCOSE RANDOM mg/dL 71 132 117           Results from last 7 days   Lab Units 03/08/25  1647   HS TNI 0HR ng/L <2                     Results from last 7 days   Lab Units 03/08/25  2009   LACTIC ACID mmol/L 1.2                   Results from last 7 days   Lab Units 03/08/25  1647   LIPASE u/L 36                 Results from last 7 days   Lab Units 03/08/25  1852   CLARITY UA  Clear   COLOR UA  Light Yellow   SPEC GRAV UA  1.032*   PH UA  5.5   GLUCOSE UA mg/dl >=1000 (1%)*   KETONES UA mg/dl Negative   BLOOD UA  Negative   PROTEIN UA mg/dl Negative   NITRITE UA  Negative   BILIRUBIN UA  Negative   UROBILINOGEN UA (BE) mg/dl <2.0   LEUKOCYTES  UA  Elevated glucose may cause decreased leukocyte values. See urine microscopic for UWBC result*   WBC UA /hpf None Seen   RBC UA /hpf 1-2   BACTERIA UA /hpf None Seen   EPITHELIAL CELLS WET PREP /hpf None Seen   MUCUS THREADS  Occasional*                 Past Medical History:   Diagnosis Date    Bell's palsy     Diabetes mellitus (HCC)     elevated bloodsugars     GERD (gastroesophageal reflux disease)     Hyperlipidemia      Present on Admission:   Ileus (HCC)   Portal Venous Gas      Admitting Diagnosis: Gastroparesis [K31.84]  Bloating [R14.0]  Abdominal pain [R10.9]  Influenza B [J10.1]  Age/Sex: 49 y.o. male    Network Utilization Review Department  ATTENTION: Please call with any questions or concerns to [phone] and carefully listen to the prompts so that you are directed to the right person. All voicemails are confidential.   For Discharge needs, contact Care Management DC Support Team at [phone] opt. 2  Send all requests for admission clinical reviews, approved or denied determinations and any other requests to dedicated fax number below belonging to the campus where the patient is receiving treatment. List of dedicated fax numbers for the Facilities:  FACILITY NAME UR FAX NUMBER   ADMISSION DENIALS (Administrative/Medical Necessity) [phone]   DISCHARGE SUPPORT TEAM (NETWORK) [phone]   PARENT CHILD HEALTH (Maternity/NICU/Pediatrics) [phone]   [facility] [phone]   [facility] [phone]   [facility] [phone]   [facility] [phone]   [facility] [phone]   [facility] [phone]   [facility] [phone]   [facility] [phone]   [facility] [phone]   [facility] [phone]   [facility]  [facility] [phone]   [facility] [phone]

## 2025-03-10 NOTE — PLAN OF CARE
Problem: PAIN - ADULT  Goal: Verbalizes/displays adequate comfort level or baseline comfort level  Description: Interventions:  - Encourage patient to monitor pain and request assistance  - Assess pain using appropriate pain scale  - Administer analgesics based on type and severity of pain and evaluate response  - Implement non-pharmacological measures as appropriate and evaluate response  - Consider cultural and social influences on pain and pain management  - Notify physician/advanced practitioner if interventions unsuccessful or patient reports new pain  Outcome: Progressing     Problem: INFECTION - ADULT  Goal: Absence or prevention of progression during hospitalization  Description: INTERVENTIONS:  - Assess and monitor for signs and symptoms of infection  - Monitor lab/diagnostic results  - Monitor all insertion sites, i.e. indwelling lines, tubes, and drains  - Monitor endotracheal if appropriate and nasal secretions for changes in amount and color  - [place] appropriate cooling/warming therapies per order  - Administer medications as ordered  - Instruct and encourage patient and family to use good hand hygiene technique  - Identify and instruct in appropriate isolation precautions for identified infection/condition  Outcome: Progressing  Goal: Absence of fever/infection during neutropenic period  Description: INTERVENTIONS:  - Monitor WBC    Outcome: Progressing     Problem: SAFETY ADULT  Goal: Patient will remain free of falls  Description: INTERVENTIONS:  - Educate patient/family on patient safety including physical limitations  - Instruct patient to call for assistance with activity   - Consult OT/PT to assist with strengthening/mobility   - Keep Call bell within reach  - Keep bed low and locked with side rails adjusted as appropriate  - Keep care items and personal belongings within reach  - Initiate and maintain comfort rounds  - Apply yellow socks and bracelet for high fall risk patients  - Consider  moving patient to room near nurses station  Outcome: Progressing  Goal: Maintain or return to baseline ADL function  Description: INTERVENTIONS:  -  Assess patient's ability to carry out ADLs; assess patient's baseline for ADL function and identify physical deficits which impact ability to perform ADLs (bathing, care of mouth/teeth, toileting, grooming, dressing, etc.)  - Assess/evaluate cause of self-care deficits   - Assess range of motion  - Assess patient's mobility; develop plan if impaired  - Assess patient's need for assistive devices and provide as appropriate  - Encourage maximum independence but intervene and supervise when necessary  - Involve family in performance of ADLs  - Assess for home care needs following discharge   - Consider OT consult to assist with ADL evaluation and planning for discharge  - Provide patient education as appropriate  Outcome: Progressing  Goal: Maintains/Returns to pre admission functional level  Description: INTERVENTIONS:  - Perform AM-PAC 6 Click Basic Mobility/ Daily Activity assessment daily.  - Set and communicate daily mobility goal to care team and patient/family/caregiver.   - Collaborate with rehabilitation services on mobility goals if consulted  - Out of bed for toileting  - Record patient progress and toleration of activity level   Outcome: Progressing     Problem: DISCHARGE PLANNING  Goal: Discharge to home or other facility with appropriate resources  Description: INTERVENTIONS:  - Identify barriers to discharge w/patient and caregiver  - Arrange for needed discharge resources and transportation as appropriate  - Identify discharge learning needs (meds, wound care, etc.)  - Arrange for interpretive services to assist at discharge as needed  - Refer to Case Management Department for coordinating discharge planning if the patient needs post-hospital services based on physician/advanced practitioner order or complex needs related to functional status, cognitive  ability, or social support system  Outcome: Progressing     Problem: Knowledge Deficit  Goal: Patient/family/caregiver demonstrates understanding of disease process, treatment plan, medications, and discharge instructions  Description: Complete learning assessment and assess knowledge base.  Interventions:  - Provide teaching at level of understanding  - Provide teaching via preferred learning methods  Outcome: Progressing

## 2025-03-11 ENCOUNTER — TELEPHONE (OUTPATIENT)
Dept: HEMATOLOGY ONCOLOGY | Facility: CLINIC | Age: 50
End: 2025-03-11

## 2025-03-11 VITALS
BODY MASS INDEX: 34.53 KG/M2 | OXYGEN SATURATION: 94 % | RESPIRATION RATE: 18 BRPM | WEIGHT: 220 LBS | DIASTOLIC BLOOD PRESSURE: 80 MMHG | HEIGHT: 67 IN | SYSTOLIC BLOOD PRESSURE: 120 MMHG | HEART RATE: 83 BPM | TEMPERATURE: 99.1 F

## 2025-03-11 LAB
ANION GAP SERPL CALCULATED.3IONS-SCNC: 7 MMOL/L (ref 4–13)
BASOPHILS # BLD AUTO: 0.01 THOUSANDS/ÂΜL (ref 0–0.1)
BASOPHILS NFR BLD AUTO: 0 % (ref 0–1)
BUN SERPL-MCNC: 11 MG/DL (ref 5–25)
CALCIUM SERPL-MCNC: 8.1 MG/DL (ref 8.4–10.2)
CHLORIDE SERPL-SCNC: 106 MMOL/L (ref 96–108)
CO2 SERPL-SCNC: 27 MMOL/L (ref 21–32)
CREAT SERPL-MCNC: 0.95 MG/DL (ref 0.6–1.3)
EOSINOPHIL # BLD AUTO: 0.27 THOUSAND/ÂΜL (ref 0–0.61)
EOSINOPHIL NFR BLD AUTO: 4 % (ref 0–6)
ERYTHROCYTE [DISTWIDTH] IN BLOOD BY AUTOMATED COUNT: 12.5 % (ref 11.6–15.1)
GFR SERPL CREATININE-BSD FRML MDRD: 93 ML/MIN/1.73SQ M
GLUCOSE SERPL-MCNC: 102 MG/DL (ref 65–140)
GLUCOSE SERPL-MCNC: 103 MG/DL (ref 65–140)
GLUCOSE SERPL-MCNC: 132 MG/DL (ref 65–140)
GLUCOSE SERPL-MCNC: 73 MG/DL (ref 65–140)
GLUCOSE SERPL-MCNC: 86 MG/DL (ref 65–140)
HCT VFR BLD AUTO: 43.5 % (ref 36.5–49.3)
HGB BLD-MCNC: 14.4 G/DL (ref 12–17)
IMM GRANULOCYTES # BLD AUTO: 0.03 THOUSAND/UL (ref 0–0.2)
IMM GRANULOCYTES NFR BLD AUTO: 0 % (ref 0–2)
LYMPHOCYTES # BLD AUTO: 1.55 THOUSANDS/ÂΜL (ref 0.6–4.47)
LYMPHOCYTES NFR BLD AUTO: 22 % (ref 14–44)
MCH RBC QN AUTO: 29.1 PG (ref 26.8–34.3)
MCHC RBC AUTO-ENTMCNC: 33.1 G/DL (ref 31.4–37.4)
MCV RBC AUTO: 88 FL (ref 82–98)
MONOCYTES # BLD AUTO: 0.47 THOUSAND/ÂΜL (ref 0.17–1.22)
MONOCYTES NFR BLD AUTO: 7 % (ref 4–12)
NEUTROPHILS # BLD AUTO: 4.64 THOUSANDS/ÂΜL (ref 1.85–7.62)
NEUTS SEG NFR BLD AUTO: 67 % (ref 43–75)
NRBC BLD AUTO-RTO: 0 /100 WBCS
PLATELET # BLD AUTO: 185 THOUSANDS/UL (ref 149–390)
PMV BLD AUTO: 9.9 FL (ref 8.9–12.7)
POTASSIUM SERPL-SCNC: 3.8 MMOL/L (ref 3.5–5.3)
RBC # BLD AUTO: 4.94 MILLION/UL (ref 3.88–5.62)
SODIUM SERPL-SCNC: 140 MMOL/L (ref 135–147)
WBC # BLD AUTO: 6.97 THOUSAND/UL (ref 4.31–10.16)

## 2025-03-11 PROCEDURE — 82948 REAGENT STRIP/BLOOD GLUCOSE: CPT

## 2025-03-11 PROCEDURE — 85025 COMPLETE CBC W/AUTO DIFF WBC: CPT

## 2025-03-11 PROCEDURE — 86301 IMMUNOASSAY TUMOR CA 19-9: CPT | Performed by: STUDENT IN AN ORGANIZED HEALTH CARE EDUCATION/TRAINING PROGRAM

## 2025-03-11 PROCEDURE — NC001 PR NO CHARGE: Performed by: SURGERY

## 2025-03-11 PROCEDURE — 80048 BASIC METABOLIC PNL TOTAL CA: CPT

## 2025-03-11 PROCEDURE — 99238 HOSP IP/OBS DSCHRG MGMT 30/<: CPT | Performed by: SURGERY

## 2025-03-11 RX ADMIN — ENOXAPARIN SODIUM 100 MG: 100 INJECTION SUBCUTANEOUS at 08:04

## 2025-03-11 RX ADMIN — PIPERACILLIN SODIUM AND TAZOBACTAM SODIUM 4.5 G: 36; 4.5 INJECTION, POWDER, LYOPHILIZED, FOR SOLUTION INTRAVENOUS at 12:02

## 2025-03-11 RX ADMIN — PANTOPRAZOLE SODIUM 40 MG: 40 INJECTION, POWDER, LYOPHILIZED, FOR SOLUTION INTRAVENOUS at 08:04

## 2025-03-11 RX ADMIN — PIPERACILLIN SODIUM AND TAZOBACTAM SODIUM 4.5 G: 36; 4.5 INJECTION, POWDER, LYOPHILIZED, FOR SOLUTION INTRAVENOUS at 03:01

## 2025-03-11 RX ADMIN — SODIUM CHLORIDE, SODIUM ACETATE ANHYDROUS, SODIUM GLUCONATE, POTASSIUM CHLORIDE, AND MAGNESIUM CHLORIDE 125 ML/HR: 526; 222; 502; 37; 30 INJECTION, SOLUTION INTRAVENOUS at 08:01

## 2025-03-11 NOTE — ASSESSMENT & PLAN NOTE
- In setting of ileus, nausea, vomiting.   - 3/8 CT AP: Small amount of portal venous gas as well as multiple gas containing perigastric veins surrounding a mildly distended stomach. Multiple loops of mildly dilated fluid-filled small bowel throughout the abdomen and pelvis which taper gradually to normal caliber distally. Liquid stool is also noted within the colon.  3/10 CT: Persistent portal venous gas, with new thrombus in the left portal vein     Plan  -advance diet  -Therapeutic Lovenox  - PRN antiemetics and analgesia. Scheduled tylenol.  - IV Zosyn/Fluconazole  - BID Protonix  - SSI, maintain euglycemia  - Replete electrolytes for goal K>4, PO4>3, Mg>2  - Encourage OOB, ambulation  - anticipate discharge within 24-48hr

## 2025-03-11 NOTE — DISCHARGE SUMMARY
Discharge Summary - Surgery-General   Name: Iam Delgado 49 y.o. male I MRN: [id number]  Unit/Bed#: S -01 I Date of Admission: 3/8/2025   Date of Service: 3/11/2025 I Hospital Day: 3    Admission Date: 3/8/2025 1619  Discharge Date: 03/11/25  Admitting Diagnosis: Gastroparesis [K31.84]  Bloating [R14.0]  Abdominal pain [R10.9]  Influenza B [J10.1]  Discharge Diagnosis:   Medical Problems       Resolved Problems  Date Reviewed: 7/18/2024   None         HPI: Iam Delgado is a 49 y.o. male who presents with nausea, abdominal pain, distention. He has a PMH of DM2, HLD, and GERD. He presents with one day of nausea, abdominal pain, bloating. Denies prior similar episodes. Denies any recent cough, fevers, chills, chest pain, or shortness of breath. He is passing flatus and having bowel movements.No prior abdominal surgeries. Not on AC/AP medications. Labs notable for a lactate of 1.2. WBC of 9.2. Creatinine of 1.02. Flu B positive. CT AP was obtained which found small amount of portal venous gas as well as multiple gas containing perigastric veins surrounding a mildly distended stomach. Multiple loops of mildly dilated fluid-filled small bowel throughout the abdomen and pelvis which taper gradually to normal caliber distally. Liquid stool is also noted within the colon. Prior EGD was obtained but unavailable for review. On exam his abdomen is distended, non-tender, without rebound or guarding.     Procedures Performed: No orders of the defined types were placed in this encounter.      Summary of Hospital Course: Patient was admitted on 3/8 with recent viral illness.  CT imaging showed new portal venous gas and gas in the perigastric veins as well as ileus.  He was admitted to the surgical service and treated with bowel rest, antibiotics and antifungals and an NG tube.  Repeat CT with p.o. contrast was performed on hospital day 2 which showed persistent portal venous gas with new portal vein thrombosis  but overall improvement of his ileus and no further gas in the perigastric veins.  Clinically he felt improved so NG tube was removed.  He was started on a heparin drip and hematology was consulted for his portal vein thrombus.  Workup was sent for hypercoagulability.  He was recommended transition to a DOAC on discharge.  His diet was slowly advanced which he tolerated and he completed a 4-day course of Zosyn and fluconazole.  On hospital day 4 he was tolerating a regular diet without nausea or vomiting, abdominal pain was improved, and he was voiding and ambulating at baseline so he was deemed appropriate for discharge with plan to start a DOAC and follow-up with hematology.    Significant Findings, Care, Treatment and Services Provided: None    Complications: None    Condition at Discharge: good       Discharge instructions/Information to patient and family:   See After Visit Summary (AVS) for information provided to patient and family.      Provisions for Follow-Up Care:  See after visit summary for information related to follow-up care and any pertinent home health orders.      PCP: Alvin Sumner MD    Disposition: Home    Planned Readmission: No     Discharge Medications:  See after visit summary for reconciled discharge medications provided to patient and family.      Discharge Statement:  I have spent a total time of 30 minutes in caring for this patient on the day of the visit/encounter.

## 2025-03-11 NOTE — DISCHARGE INSTR - AVS FIRST PAGE
Please follow-up with hematology as instructed.  Please take medications as instructed.  Please follow-up with your PCP.

## 2025-03-11 NOTE — TELEPHONE ENCOUNTER
New Patient Intake Form   Patient Details:    Iam Ovalleskevon  1975    Appointment Information   Who is calling to schedule? Zeenat Brown   If not self, what is the caller's name?   NA   DID YOU CONFIRM INSURANCE WITH PATIENT? E verified, Routed to finance   Referring provider Dr. Perla   What is the diagnosis? Portal vein thrombosis      Is there a confirmed tissue diagnosis?   NA     Is there a biopsy ordered or pending?  Please specify dates  If yes, route to NN/OCC   NA     Is patient aware of diagnosis?   Yes     Have you had any imaging or labs done?  If yes, where?  (If imaging done outside of [facility], please remind patient to bring a disk.) Yes-[facility]     If imaging done at outside facility, did you instruct patient to obtain discs and bring to visit? NA   Have you been seen by another Oncologist/Hematologist?  If so, who and where? No   Are the records in [facility] or Care Everywhere? Yes   Does the patient have records at another facility/hospital?    If yes, Name of facility, city and state where facility is located. No     Did you instruct patient to have records faxed to [facility] and provide rightx number?   NA   Preferred [place]   Is the patient willing to be seen by another provider?  (This is for breast patients only) NA     Did you send new patient paperwork?  Email or mail? NA   Miscellaneous Information: The patient is scheduled for his HFU appointment with Dr. Perla on 4/14 at 0840 in the [place] office.

## 2025-03-11 NOTE — CASE MANAGEMENT
Case Management Discharge Planning Note    Patient name Iam Delgado  Location S /S -01 MRN [id number]  : 1975 Date 3/11/2025       Current Admission Date: 3/8/2025  Current Admission Diagnosis:Portal Venous Gas   Patient Active Problem List    Diagnosis Date Noted Date Diagnosed    Portal vein thrombosis 03/10/2025     Ileus (HCC) 2025     Portal Venous Gas 2025     Facial weakness 10/16/2023     Arthralgia of right knee 2015     GERD without esophagitis 2014     Hyperlipidemia 01/15/2014     Type 2 diabetes mellitus (HCC) 2014     Gout 2013       LOS (days): 3  Geometric Mean LOS (GMLOS) (days):   Days to GMLOS:     OBJECTIVE:  Risk of Unplanned Readmission Score: 8.13         Current admission status: Inpatient   Preferred Pharmacy:   [facility]/pharm[facility] #[id number] - [place] - [address]  [address] [address]  Phone: [phone] Fax: [phone]    [facility] [address] [address]  Phone: [phone] Fax: [phone]    Primary Care Provider: Alvin Sumner MD    Primary Insurance: [facility]  Secondary Insurance:     DISCHARGE DETAILS:                                          Other Referral/Resources/Interventions Provided:  Interventions: Prescription Price Check         Treatment Team Recommendation: Home  Discharge Destination Plan:: Home  Transport at Discharge : Family                                         CM was asked to price check Eliquis for the patient.  CM called patient's pharmacy and spoke with Martin [phone] who confirmed that the Eliquis starter pack is covered and his co-payment is $5.    CM passed this on to the surgery team.    CM department will continue to follow to assist with discharge coordination.

## 2025-03-11 NOTE — UTILIZATION REVIEW
Continued Stay Review    Date: 3./11/25                           Current Patient Class: Inpatient  Current Level of Care: MS    HPI:49 y.o. male initially admitted on 3/8   Current Diagnosis:Portal vein gas In setting of ileus, nausea, vomiting.   New thrombus in the left portal vein on CT 3/10/25.     Assessment/Plan:   3/10 Medical Oncology consult - pt presented with abdominal distention with nausea, vomiting in the setting of ileus, repeat CAT scan showed small amount of portal venous gas, multiple gas containing perigastric vein, with new thrombus in the left portal vein . Start Lovenox 1 mg/kg q12h and then switch to apixaban 5 mg p.o. twice daily . Plan for AC at least  3 months as  this is provoked by ileus and possible free gas in the perigastric vein and portal venous system as he was initiated on GP L1 inhibitor about 4 to 6 weeks ago . Check      3/11  No acute events overnight.  Denies abdominal pain.  Nausea significantly improved, tolerating clears.  Passing flatus and having BM. K 3.8 today  . Plan to advance diet .IVF infusing . IV Zosyn, IV Fluconazole.  Therapeutic Lovenox .           Medications:   Scheduled Medications:  dextrose, 12.5 g, Intravenous, Once  enoxaparin, 1 mg/kg, Subcutaneous, Q12H JULISA  fluconazole, 400 mg, Intravenous, Q24H  insulin lispro, 1-6 Units, Subcutaneous, 4x Daily (AC & HS)  pantoprazole, 40 mg, Intravenous, Q12H JULISA  piperacillin-tazobactam, 4.5 g, Intravenous, Q8H    acetaminophen (Ofirmev) injection 1,000 mg  Dose: 1,000 mg  Freq: Every 6 hours scheduled Route: IV  Last Dose: 1,000 mg (03/10/25 1011)  Start: 03/09/25 0000 End: 03/11/25 0359      Continuous IV Infusions:  multi-electrolyte, 125 mL/hr, Intravenous, Continuous      PRN Meds:  HYDROmorphone, 0.5 mg, Intravenous, Q4H PRN  HYDROmorphone, 0.2 mg, Intravenous, Q3H PRN mod pain x1 3/10   ondansetron, 4 mg, Intravenous, Q6H PRN  phenol, 1 spray, Mouth/Throat, Q2H PRN  promethazine, 25 mg, Intramuscular,  Q6H PRN      Discharge Plan: TBD    Vital Signs (last 3 days)       Date/Time Temp Pulse Resp BP MAP (mmHg) SpO2 Calculated FIO2 (%) - Nasal Cannula Nasal Cannula O2 Flow Rate (L/min) O2 Device Patient Position - Orthostatic VS Pain    03/11/25 06:50:09 98.7 °F (37.1 °C) 75 17 144/86 105 94 % -- -- -- -- --    03/10/25 23:00:18 97.9 °F (36.6 °C) 76 -- 122/75 91 95 % -- -- -- -- --    03/10/25 2225 -- -- -- -- -- -- -- -- -- -- 5    03/10/25 19:07:20 97.9 °F (36.6 °C) 77 -- 132/91 105 96 % -- -- -- -- No Pain    03/10/25 14:57:16 98.1 °F (36.7 °C) 81 18 148/97 114 96 % -- -- -- -- --    03/10/25 12:13:22 98 °F (36.7 °C) 79 18 131/90 104 95 % -- -- -- -- --    03/10/25 0900 -- -- -- -- -- -- -- -- None (Room air) -- No Pain    03/10/25 07:50:59 98.2 °F (36.8 °C) 80 18 118/79 92 94 % -- -- -- -- --    03/09/25 23:47:31 97.4 °F (36.3 °C) 72 -- 109/75 86 94 % -- -- -- -- --    03/09/25 2155 -- -- -- -- -- -- -- -- -- -- 5    03/09/25 15:27:06 98.3 °F (36.8 °C) 82 16 128/84 99 92 % -- -- None (Room air) Sitting No Pain    03/09/25 1100 -- -- -- -- -- -- -- -- None (Room air) -- --    03/09/25 07:08:58 98.9 °F (37.2 °C) 86 15 133/86 102 93 % -- -- -- -- --    03/09/25 0316 -- -- -- -- -- -- -- -- -- -- 5    03/08/25 2232 -- -- -- -- -- -- -- -- -- -- No Pain    03/08/25 22:26:12 99.1 °F (37.3 °C) 104 24 150/93 112 89 % -- -- -- -- --    03/08/25 2115 -- 96 16 128/79 98 96 % 32 3 L/min Nasal cannula Lying --    03/08/25 2029 -- 103 16 145/86 107 96 % -- -- None (Room air) Lying 7    03/08/25 1845 -- 111 16 152/88 112 95 % -- -- None (Room air) Sitting --    03/08/25 1630 -- -- -- -- -- -- -- -- None (Room air) -- --    03/08/25 1622 98.4 °F (36.9 °C) 104 18 165/94 121 97 % -- -- None (Room air) Sitting 7          Weight (last 2 days)       None            Pertinent Labs/Diagnostic Results:   Radiology:  CT abdomen pelvis w contrast   Final Interpretation by Kenzie Burk MD (03/10 1134)      Persistent portal venous  gas, with new thrombus in the left portal vein. Resolution of venous gas in the gastroepiploic vein. No pneumatosis. No extravasation of oral contrast.      The study was marked in EPIC for immediate notification.         Workstation performed: [id number]         XR chest portable   Final Interpretation by Dione Andres MD (03/09 0806)      No acute cardiopulmonary disease.      NG tube in stomach with gastric distention.            Workstation performed: [id number]         CT abdomen pelvis with contrast   Final Interpretation by Poncho Bacon MD (03/08 1958)      Small amount of portal venous gas as well as multiple gas containing perigastric veins surrounding a mildly distended stomach. These findings are nonspecific and has been found with vomiting induced elevated intragastric pressure with air spreading to    the portal venous system. However, other etiologies such as ischemia, emphysematous gastritis or other mucosal injury cannot be excluded. Surgical consultation is recommended.      In addition, there are multiple loops of mildly dilated fluid-filled small bowel throughout the abdomen and pelvis which taper gradually to normal caliber distally. Liquid stool is also noted within the colon. Differential considerations include a    gastroenteritis versus early/partial small bowel obstruction versus ileus. A repeat CT abdomen/pelvis after the administration of oral contrast is recommended for further evaluation.      No free air or free fluid.                  I personally discussed this study with COURTNEY MCNEAL on 3/8/2025 7:54 PM.            Workstation performed: [id number]         XR chest 1 view portable   ED Interpretation by Jessica Palacios MD (03/08 1725)   No acute cardiopulmonary process noted.      Final Interpretation by Dione Andres MD (03/09 0805)      No acute cardiopulmonary disease.            Workstation performed: [id number]           Cardiology:  ECG 12 lead   Final  Result by Nirmal Kapoor MD (03/10 0859)   Sinus tachycardia   Incomplete right bundle branch block   Cannot rule out Anterior infarct , age undetermined   Abnormal ECG   No previous ECGs available   Confirmed by Nirmal Kapoor ([id number]) on 3/10/2025 8:59:29 AM        GI:  No orders to display           Results from last 7 days   Lab Units 03/11/25  0503 03/10/25  0506 03/09/25  0335 03/08/25  1647   WBC Thousand/uL 6.97 7.92 10.57* 9.21   HEMOGLOBIN g/dL 14.4 15.1 16.5 18.5*   HEMATOCRIT % 43.5 47.1 50.2* 56.5*   PLATELETS Thousands/uL 185 169 211 182   TOTAL NEUT ABS Thousands/µL 4.64  --  8.11* 6.99         Results from last 7 days   Lab Units 03/11/25  0503 03/10/25  0506 03/09/25  0335 03/08/25  1647   SODIUM mmol/L 140 144 146 144   POTASSIUM mmol/L 3.8 3.7 3.8 4.4   CHLORIDE mmol/L 106 108 108 104   CO2 mmol/L 27 29 27 30   ANION GAP mmol/L 7 7 11 10   BUN mg/dL 11 15 18 15   CREATININE mg/dL 0.95 0.96 0.95 1.02   EGFR ml/min/1.73sq m 93 92 93 85   CALCIUM mg/dL 8.1* 8.1* 9.1 10.5*   MAGNESIUM mg/dL  --  2.3 1.9  --    PHOSPHORUS mg/dL  --  2.4* 4.2  --      Results from last 7 days   Lab Units 03/10/25  0506 03/08/25  1647   AST U/L 11* 21   ALT U/L 16 23   ALK PHOS U/L 43 58   TOTAL PROTEIN g/dL 6.0* 8.5*   ALBUMIN g/dL 3.7 5.1*   TOTAL BILIRUBIN mg/dL 0.80 0.66   BILIRUBIN DIRECT mg/dL 0.14  --      Results from last 7 days   Lab Units 03/11/25  0648 03/11/25  0009 03/10/25  2106 03/10/25  1537 03/10/25  1511 03/10/25  1455 03/10/25  1424 03/10/25  1212 03/10/25  0547 03/09/25  2350 03/09/25  2000 03/09/25  1820   POC GLUCOSE mg/dl 103 73 84 117 79 59* 67 82 78 79 159* 76     Results from last 7 days   Lab Units 03/11/25  0503 03/10/25  0506 03/09/25  0335 03/08/25  1647   GLUCOSE RANDOM mg/dL 86 71 132 117               Results from last 7 days   Lab Units 03/08/25  1647   HS TNI 0HR ng/L <2               Results from last 7 days   Lab Units 03/08/25  2009   LACTIC ACID mmol/L 1.2           Results from last 7  days   Lab Units 03/08/25  1647   LIPASE u/L 36                 Results from last 7 days   Lab Units 03/08/25  1852   CLARITY UA  Clear   COLOR UA  Light Yellow   SPEC GRAV UA  1.032*   PH UA  5.5   GLUCOSE UA mg/dl >=1000 (1%)*   KETONES UA mg/dl Negative   BLOOD UA  Negative   PROTEIN UA mg/dl Negative   NITRITE UA  Negative   BILIRUBIN UA  Negative   UROBILINOGEN UA (BE) mg/dl <2.0   LEUKOCYTES UA  Elevated glucose may cause decreased leukocyte values. See urine microscopic for UWBC result*   WBC UA /hpf None Seen   RBC UA /hpf 1-2   BACTERIA UA /hpf None Seen   EPITHELIAL CELLS WET PREP /hpf None Seen   MUCUS THREADS  Occasional*             Network Utilization Review Department  ATTENTION: Please call with any questions or concerns to [phone] and carefully listen to the prompts so that you are directed to the right person. All voicemails are confidential.   For Discharge needs, contact Care Management DC Support Team at [phone] opt. 2  Send all requests for admission clinical reviews, approved or denied determinations and any other requests to dedicated fax number below belonging to the [place] where the patient is receiving treatment. List of dedicated fax numbers for the Facilities:  FACILITY NAME UR FAX NUMBER   ADMISSION DENIALS (Administrative/Medical Necessity) [phone]   DISCHARGE SUPPORT TEAM (NETWORK) [phone]   PARENT CHILD HEALTH (Maternity/NICU/Pediatrics) [phone]   [facility] [phone]   [facility] [phone]   [facility] [phone]   [facility] [phone]   [facility] [phone]   [facility] [phone]   [facility] [phone]   [facility] [phone]   [facility] [phone]   [facility] [phone]   [facility] [phone]   [facility] [phone]

## 2025-03-11 NOTE — PLAN OF CARE
Problem: PAIN - ADULT  Goal: Verbalizes/displays adequate comfort level or baseline comfort level  Description: Interventions:  - Encourage patient to monitor pain and request assistance  - Assess pain using appropriate pain scale  - Administer analgesics based on type and severity of pain and evaluate response  - Implement non-pharmacological measures as appropriate and evaluate response  - Consider cultural and social influences on pain and pain management  - Notify physician/advanced practitioner if interventions unsuccessful or patient reports new pain  3/11/2025 1237 by Azeb Moore  Outcome: Progressing  3/11/2025 1237 by Azeb Moore  Outcome: Progressing     Problem: INFECTION - ADULT  Goal: Absence or prevention of progression during hospitalization  Description: INTERVENTIONS:  - Assess and monitor for signs and symptoms of infection  - Monitor lab/diagnostic results  - Monitor all insertion sites, i.e. indwelling lines, tubes, and drains  - Monitor endotracheal if appropriate and nasal secretions for changes in amount and color  - Corn appropriate cooling/warming therapies per order  - Administer medications as ordered  - Instruct and encourage patient and family to use good hand hygiene technique  - Identify and instruct in appropriate isolation precautions for identified infection/condition  3/11/2025 1237 by Azeb Moore  Outcome: Progressing  3/11/2025 1237 by Azeb Moore  Outcome: Progressing  Goal: Absence of fever/infection during neutropenic period  Description: INTERVENTIONS:  - Monitor WBC    3/11/2025 1237 by Azeb Moore  Outcome: Progressing  3/11/2025 1237 by Azeb Moore  Outcome: Not Progressing     Problem: SAFETY ADULT  Goal: Patient will remain free of falls  Description: INTERVENTIONS:  - Educate patient/family on patient safety including physical limitations  - Instruct patient to call for assistance with activity   - Consult OT/PT to assist with strengthening/mobility   - Keep Call  bell within reach  - Keep bed low and locked with side rails adjusted as appropriate  - Keep care items and personal belongings within reach  - Initiate and maintain comfort rounds  - Make Fall Risk Sign visible to staff  - Apply yellow socks and bracelet for high fall risk patients  - Consider moving patient to room near nurses station  3/11/2025 1237 by Azeb Moore  Outcome: Progressing  3/11/2025 1237 by Azeb Moore  Outcome: Not Progressing  Goal: Maintain or return to baseline ADL function  Description: INTERVENTIONS:  -  Assess patient's ability to carry out ADLs; assess patient's baseline for ADL function and identify physical deficits which impact ability to perform ADLs (bathing, care of mouth/teeth, toileting, grooming, dressing, etc.)  - Assess/evaluate cause of self-care deficits   - Assess range of motion  - Assess patient's mobility; develop plan if impaired  - Assess patient's need for assistive devices and provide as appropriate  - Encourage maximum independence but intervene and supervise when necessary  - Involve family in performance of ADLs  - Assess for home care needs following discharge   - Consider OT consult to assist with ADL evaluation and planning for discharge  - Provide patient education as appropriate  3/11/2025 1237 by Azeb Moore  Outcome: Progressing  3/11/2025 1237 by Azeb Moore  Outcome: Not Progressing  Goal: Maintains/Returns to pre admission functional level  Description: INTERVENTIONS:  - Perform AM-PAC 6 Click Basic Mobility/ Daily Activity assessment daily.  - Set and communicate daily mobility goal to care team and patient/family/caregiver.   - Collaborate with rehabilitation services on mobility goals if consulted  - Out of bed for toileting  - Record patient progress and toleration of activity level   3/11/2025 1237 by Azeb Moore  Outcome: Progressing  3/11/2025 1237 by Azeb Moore  Outcome: Not Progressing     Problem: DISCHARGE PLANNING  Goal: Discharge to home or  other facility with appropriate resources  Description: INTERVENTIONS:  - Identify barriers to discharge w/patient and caregiver  - Arrange for needed discharge resources and transportation as appropriate  - Identify discharge learning needs (meds, wound care, etc.)  - Arrange for interpretive services to assist at discharge as needed  - Refer to Case Management Department for coordinating discharge planning if the patient needs post-hospital services based on physician/advanced practitioner order or complex needs related to functional status, cognitive ability, or social support system  3/11/2025 1237 by Azeb Moore  Outcome: Progressing  3/11/2025 1237 by Azeb Moore  Outcome: Not Progressing     Problem: Knowledge Deficit  Goal: Patient/family/caregiver demonstrates understanding of disease process, treatment plan, medications, and discharge instructions  Description: Complete learning assessment and assess knowledge base.  Interventions:  - Provide teaching at level of understanding  - Provide teaching via preferred learning methods  3/11/2025 1237 by Azeb Moore  Outcome: Progressing  3/11/2025 1237 by Azeb Moore  Outcome: Not Progressing

## 2025-03-11 NOTE — PROGRESS NOTES
Progress Note - Surgery-General   Name: Iam Delgado 49 y.o. male I MRN: [id number]  Unit/Bed#: S -01 I Date of Admission: 3/8/2025   Date of Service: 3/11/2025 I Hospital Day: 3    Assessment & Plan  Portal Venous Gas  - In setting of ileus, nausea, vomiting.   - 3/8 CT AP: Small amount of portal venous gas as well as multiple gas containing perigastric veins surrounding a mildly distended stomach. Multiple loops of mildly dilated fluid-filled small bowel throughout the abdomen and pelvis which taper gradually to normal caliber distally. Liquid stool is also noted within the colon.  3/10 CT: Persistent portal venous gas, with new thrombus in the left portal vein     Plan  -advance diet  -Therapeutic Lovenox  - PRN antiemetics and analgesia. Scheduled tylenol.  - IV Zosyn/Fluconazole  - BID Protonix  - SSI, maintain euglycemia  - Replete electrolytes for goal K>4, PO4>3, Mg>2  - Encourage OOB, ambulation  - anticipate discharge within 24-48hr  Ileus (HCC)  - With associated nausea, vomiting. In setting of Flu and recent Mounjaro use.  - Replete electrolytes for goal K>4, PO4>3, Mg>2  - See plan above  Portal vein thrombosis          Subjective   No acute events overnight.  Denies abdominal pain.  Nausea significantly improved, tolerating clears.  Passing flatus and having BM. No acute complaints or concerns this AM.    Objective :  Temp:  [97.9 °F (36.6 °C)-98.2 °F (36.8 °C)] 97.9 °F (36.6 °C)  HR:  [76-81] 76  BP: (118-148)/(75-97) 122/75  Resp:  [18] 18  SpO2:  [94 %-96 %] 95 %  O2 Device: None (Room air)    I/O         03/09 0701  03/10 0700 03/10 0701 03/11 0700    P.O. 780 354    I.V. (mL/kg) 2741.7 (27.5) 1004.2 (10.1)    Total Intake(mL/kg) 3521.7 (35.3) 1358.2 (13.6)    Urine (mL/kg/hr) 800 (0.3) 1400 (0.6)    Emesis/NG output 1900 675    Total Output 2700 2075    Net +821.7 -716.8                  Physical Exam  General: NAD  HENT: NCAT MMM  Neck: supple, no JVD  CV: nl rate  Lungs: nl wob. No  resp distress  ABD: Soft, nontender, nondistended  Extrem: No CCE  Neuro: AAOx3      Lab Results: I have reviewed the following results:  Recent Labs     03/08/25  1647 03/08/25 2009 03/09/25  0335 03/10/25  0506   WBC 9.21  --    < > 7.92   HGB 18.5*  --    < > 15.1   HCT 56.5*  --    < > 47.1     --    < > 169   SODIUM 144  --    < > 144   K 4.4  --    < > 3.7     --    < > 108   CO2 30  --    < > 29   BUN 15  --    < > 15   CREATININE 1.02  --    < > 0.96   GLUC 117  --    < > 71   MG  --   --    < > 2.3   PHOS  --   --    < > 2.4*   AST 21  --   --  11*   ALT 23  --   --  16   ALB 5.1*  --   --  3.7   TBILI 0.66  --   --  0.80   ALKPHOS 58  --   --  43   HSTNI0 <2  --   --   --    LACTICACID  --  1.2  --   --     < > = values in this interval not displayed.             VTE Pharmacologic Prophylaxis: VTE covered by:  enoxaparin, Subcutaneous, 100 mg at 03/10/25 8046     VTE Mechanical Prophylaxis: sequential compression device

## 2025-03-12 ENCOUNTER — TRANSITIONAL CARE MANAGEMENT (OUTPATIENT)
Dept: FAMILY MEDICINE CLINIC | Facility: CLINIC | Age: 50
End: 2025-03-12

## 2025-03-12 LAB — CANCER AG19-9 SERPL-ACNC: 7 U/ML (ref 0–35)

## 2025-03-12 NOTE — UTILIZATION REVIEW
NOTIFICATION OF ADMISSION DISCHARGE   This is a Notification of Discharge from [facility]. Please be advised that this patient has been discharge from our facility. Below you will find the admission and discharge date and time including the patient’s disposition.   UTILIZATION REVIEW CONTACT:  Thu Knutson  Utilization   Network Utilization Review Department  Phone: [phone] x carefully listen to the prompts. All voicemails are confidential.  Email: [email or web address]     ADMISSION INFORMATION  PRESENTATION DATE: 3/8/2025  4:19 PM  OBERVATION ADMISSION DATE: N/A  INPATIENT ADMISSION DATE: 3/8/25  8:55 PM   DISCHARGE DATE: 3/11/2025  5:28 PM   DISPOSITION:Home/Self Care    Network Utilization Review Department  ATTENTION: Please call with any questions or concerns to [phone] and carefully listen to the prompts so that you are directed to the right person. All voicemails are confidential.   For Discharge needs, contact Care Management DC Support Team at [phone] opt. 2  Send all requests for admission clinical reviews, approved or denied determinations and any other requests to dedicated fax number below belonging to the campus where the patient is receiving treatment. List of dedicated fax numbers for the Facilities:  FACILITY NAME UR FAX NUMBER   ADMISSION DENIALS (Administrative/Medical Necessity) [phone]   DISCHARGE SUPPORT TEAM ([facility]) [phone]   PARENT CHILD HEALTH (Maternity/NICU/Pediatrics) [phone]   [facility] [phone]   [facility] [phone]   [facility] [phone]   [facility] [phone]   [facility] [phone]   [facility] [phone]   [facility] [phone]   [facility] [phone]   [facility]  [facility] [phone]   [facility] [phone]   [facility] [phone]   [facility] [phone]

## 2025-03-14 ENCOUNTER — OFFICE VISIT (OUTPATIENT)
Dept: FAMILY MEDICINE CLINIC | Facility: CLINIC | Age: 50
End: 2025-03-14
Payer: COMMERCIAL

## 2025-03-14 VITALS
TEMPERATURE: 97 F | OXYGEN SATURATION: 98 % | BODY MASS INDEX: 33.9 KG/M2 | DIASTOLIC BLOOD PRESSURE: 82 MMHG | HEART RATE: 78 BPM | SYSTOLIC BLOOD PRESSURE: 130 MMHG | HEIGHT: 67 IN | WEIGHT: 216 LBS

## 2025-03-14 DIAGNOSIS — N52.9 ERECTILE DYSFUNCTION, UNSPECIFIED ERECTILE DYSFUNCTION TYPE: ICD-10-CM

## 2025-03-14 PROCEDURE — 99495 TRANSJ CARE MGMT MOD F2F 14D: CPT | Performed by: NURSE PRACTITIONER

## 2025-03-14 RX ORDER — SILDENAFIL 50 MG/1
50 TABLET, FILM COATED ORAL AS NEEDED
Qty: 90 TABLET | Refills: 0 | Status: SHIPPED | OUTPATIENT
Start: 2025-03-14

## 2025-03-15 NOTE — PROGRESS NOTES
Transition of Care Visit  Name: Iam Delgado      : 1975      MRN: [id number]  Encounter Provider: AMADOU Dyer  Encounter Date: 3/14/2025   Encounter department: [facility]    Assessment & Plan  Erectile dysfunction, unspecified erectile dysfunction type    Orders:  •  sildenafil (VIAGRA) 50 MG tablet; Take 1 tablet (50 mg total) by mouth as needed for erectile dysfunction (WILL BE USING [employer].)           History of Present Illness     Transitional Care Management Review:   Iam Delgado is a 49 y.o. male here for TCM follow up.     During the TCM phone call patient stated:  TCM Call (since 3/1/2025)     Date and time call was made  3/12/2025 11:19 AM    Hospital care reviewed  Records reviewed    Date of Admission  25    Date of discharge  25    Diagnosis  Gastroparesis    Disposition  Home    Were the patients medications reviewed and updated  Yes      TCM Call (since 3/1/2025)     Post hospital issues  None    Scheduled for follow up?  Yes    Did you obtain your prescribed medications  Yes    Do you need help managing your prescriptions or medications  No    Is transportation to your appointment needed  No    I have advised the patient to call PCP with any new or worsening symptoms  TAWANNA Valdivia          Chief complaint: 49 y.o.male presenting for hospital follow up for ileus and portal vein thrombosis.           HMI: Patient was admitted on 2025 to [facility] for nausea abdominal pain and abdominal distention.  Patient discharged on 2025 with the diagnoses of ileus and portal vein thrombosis.  Patient had a CT of abdomen and pelvis which exhibited new portal venous gas and gas in the perigastric veins as well as an ileus.  Patient was treated with bowel rest, antibiotics and antifungals and an NG tube.  Repeat CT with contrast was performed on day 2 which showed a persistent portal venous gas with a new portal  "vein thrombosis but overall improvement in his ileus and no further gas in the perigastric veins.  NG tube was removed he had been started on a heparin drip and hematology was consulted due to the portal vein thrombosis.  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  Patient has a history of not tolerating Ozempic at higher doses for management of his diabetes due to gastric issues and he was switched over to Mounjaro.  Plan is to keep him on Mounjaro at current dose for at least 1 more month while he is being checked out for causes of the ileus and portal vein thrombosis.       Review of Systems   Constitutional: Negative.    Respiratory: Negative.     Cardiovascular: Negative.    Gastrointestinal: Negative.    Musculoskeletal: Negative.    Neurological: Negative.    Psychiatric/Behavioral: Negative.       Objective   /82   Pulse 78   Temp (!) 97 °F (36.1 °C)   Ht 5' 7\" (1.702 m)   Wt 98 kg (216 lb)   SpO2 98%   BMI 33.83 kg/m² (Reviewed)    Physical Exam  Vitals reviewed.   Constitutional:       General: He is not in acute distress.     Appearance: He is not ill-appearing.   HENT:      Head: Normocephalic and atraumatic.      Right Ear: External ear normal.      Left Ear: External ear normal.   Eyes:      Extraocular Movements: Extraocular movements intact.      Conjunctiva/sclera: Conjunctivae normal.      Pupils: Pupils are equal, round, and reactive to light.   Cardiovascular:      Rate and Rhythm: Normal rate and regular rhythm.      Heart sounds: Normal heart sounds.   Pulmonary:      Effort: Pulmonary effort is normal.      Breath sounds: Normal breath sounds.   Abdominal:      General: Abdomen is flat. Bowel sounds are normal.      Palpations: Abdomen is soft.   Musculoskeletal:      Cervical back: Neck supple.   Skin:     General: Skin is warm and dry.      Capillary Refill: Capillary refill takes less than 2 seconds. "   Neurological:      Mental Status: He is alert and oriented to person, place, and time.   Psychiatric:         Mood and Affect: Mood normal.         Behavior: Behavior normal.       Medications have been reviewed by provider in current encounter

## 2025-03-27 DIAGNOSIS — E78.2 MIXED HYPERLIPIDEMIA: ICD-10-CM

## 2025-03-27 RX ORDER — ATORVASTATIN CALCIUM 10 MG/1
10 TABLET, FILM COATED ORAL DAILY
Qty: 90 TABLET | Refills: 3 | Status: SHIPPED | OUTPATIENT
Start: 2025-03-27

## 2025-04-08 DIAGNOSIS — E11.9 TYPE 2 DIABETES MELLITUS WITHOUT COMPLICATION, WITHOUT LONG-TERM CURRENT USE OF INSULIN (HCC): ICD-10-CM

## 2025-04-09 RX ORDER — GLIPIZIDE 5 MG/1
5 TABLET, FILM COATED, EXTENDED RELEASE ORAL DAILY
Qty: 90 TABLET | Refills: 1 | Status: SHIPPED | OUTPATIENT
Start: 2025-04-09

## 2025-04-10 ENCOUNTER — TELEPHONE (OUTPATIENT)
Dept: HEMATOLOGY ONCOLOGY | Facility: CLINIC | Age: 50
End: 2025-04-10

## 2025-04-10 NOTE — TELEPHONE ENCOUNTER
I phoned the patient, introduced myself, and indicated that I was calling from [facility] to provide a reminder for his upcoming appointment with Dr. Perla on 4/14 at 0840 in the [place] office. Iam indicated that he is aware of the appointment and the location of the office. He was appreciative of the call.

## 2025-04-14 ENCOUNTER — OFFICE VISIT (OUTPATIENT)
Dept: HEMATOLOGY ONCOLOGY | Facility: CLINIC | Age: 50
End: 2025-04-14
Payer: COMMERCIAL

## 2025-04-14 VITALS
WEIGHT: 220 LBS | BODY MASS INDEX: 34.53 KG/M2 | OXYGEN SATURATION: 95 % | HEART RATE: 91 BPM | HEIGHT: 67 IN | TEMPERATURE: 97.3 F | RESPIRATION RATE: 18 BRPM | DIASTOLIC BLOOD PRESSURE: 86 MMHG | SYSTOLIC BLOOD PRESSURE: 116 MMHG

## 2025-04-14 DIAGNOSIS — I81 PORTAL VEIN THROMBOSIS: Primary | ICD-10-CM

## 2025-04-14 PROCEDURE — 99213 OFFICE O/P EST LOW 20 MIN: CPT | Performed by: INTERNAL MEDICINE

## 2025-04-14 NOTE — PROGRESS NOTES
Name: Iam Delgado      : 1975      MRN: [id number]  Encounter Provider: Aspen Perla MD  Encounter Date: 2025   Encounter department: [facility]  :  Assessment & Plan  Portal vein thrombosis  49-year-old male with uncontrolled diabetes mellitus, obesity, hyperlipidemia, presented with abdominal distention with nausea, vomiting in the setting of ileus, repeat CAT scan showed small amount of portal venous gas, multiple gas containing perigastric vein, with new thrombus in the left portal vein    Initiated on Lovenox and later on switched to Eliquis 5 mg twice a day tolerating this very well no evidence of bleeding    Negative family history of clotting disorder    I believe this is provoked by the ileus and the cough and microperforation with gas in the perigastric vein with subsequently new thrombus in the left portal vein    Treatment for at least 3-month with apixaban, will repeat CAT scan to assess the status and will proceed from there  Orders:    CT abdomen pelvis w contrast; Future    Comprehensive metabolic panel; Future    CBC and differential; Future        Return in about 3 months (around 2025).    History of Present Illness   49-year-old male with past medical history significant for type 2 diabetes, A1c 10.1 from 2024, hyperlipidemia, history of Bell's palsy right sided who presented to the ED on 3/8/2025 with chief complaint of nausea/vomiting/abdominal distention with CT  abdomen and pelvis showing small amount of portal venous gas as well as multiple gas containing perigastric vein surrounding a mildly distended stomach also multiple loops of mildly dilated fluid-filled small bowel with general surgery consultation with concerns for ileus managed conservatively however persistent high output from NG tube requiring repeat CT scan performed 3/10/2025 showing persistent portal vein gas with now new thrombus in left portal vein and  "resolution of venous gas in gastroepiploic vein.     Surgical consultation for evaluation for hypercoagulability workup and or possible antithrombolytic therapy.     Patient seen at bedside, denying any previous clot history or family history of clotting history. He follows has been on GPL1 inhibitor for approximately 4 to 6 weeks.  History of Present Illness         Review of Systems   Constitutional:  Negative for chills and fever.   HENT:  Negative for ear pain and sore throat.    Eyes:  Negative for pain and visual disturbance.   Respiratory:  Negative for cough and shortness of breath.    Cardiovascular:  Negative for chest pain and palpitations.   Gastrointestinal:  Negative for abdominal pain and vomiting.   Genitourinary:  Negative for dysuria and hematuria.   Musculoskeletal:  Negative for arthralgias and back pain.   Skin:  Negative for color change and rash.   Neurological:  Negative for seizures and syncope.   All other systems reviewed and are negative.          Objective   /86 (Patient Position: Sitting, Cuff Size: Large)   Pulse 91   Temp (!) 97.3 °F (36.3 °C) (Temporal)   Resp 18   Ht 5' 7\" (1.702 m)   Wt 99.8 kg (220 lb)   SpO2 95%   BMI 34.46 kg/m²     Physical Exam  Vitals reviewed.   Constitutional:       General: He is not in acute distress.     Appearance: He is well-developed. He is not diaphoretic.   HENT:      Head: Normocephalic and atraumatic.   Eyes:      Conjunctiva/sclera: Conjunctivae normal.   Neck:      Trachea: No tracheal deviation.   Cardiovascular:      Rate and Rhythm: Normal rate and regular rhythm.      Heart sounds: No murmur heard.     No friction rub. No gallop.   Pulmonary:      Effort: Pulmonary effort is normal. No respiratory distress.      Breath sounds: Normal breath sounds. No wheezing or rales.   Chest:      Chest wall: No tenderness.   Abdominal:      General: There is no distension.      Palpations: Abdomen is soft.      Tenderness: There is no " abdominal tenderness.   Musculoskeletal:      Cervical back: Normal range of motion and neck supple.      Right lower leg: No edema.      Left lower leg: No edema.   Lymphadenopathy:      Cervical: No cervical adenopathy.   Skin:     General: Skin is warm and dry.      Coloration: Skin is not pale.      Findings: No erythema.   Neurological:      Mental Status: He is alert.   Psychiatric:         Behavior: Behavior normal.         Thought Content: Thought content normal.       Physical Exam      Results    Labs: I have reviewed the following labs:  Results for orders placed or performed during the hospital encounter of 03/08/25   FLU/COVID Rapid Antigen (30 min. TAT) - Preferred screening test in ED    Specimen: Nose; Nares   Result Value Ref Range    SARS COV Rapid Antigen Negative Negative    Influenza A Rapid Antigen Negative Negative    Influenza B Rapid Antigen Positive (A) Negative   CBC and differential   Result Value Ref Range    WBC 9.21 4.31 - 10.16 Thousand/uL    RBC 6.48 (H) 3.88 - 5.62 Million/uL    Hemoglobin 18.5 (H) 12.0 - 17.0 g/dL    Hematocrit 56.5 (H) 36.5 - 49.3 %    MCV 87 82 - 98 fL    MCH 28.5 26.8 - 34.3 pg    MCHC 32.7 31.4 - 37.4 g/dL    RDW 12.7 11.6 - 15.1 %    MPV 11.1 8.9 - 12.7 fL    Platelets 182 149 - 390 Thousands/uL    nRBC 0 /100 WBCs    Segmented % 76 (H) 43 - 75 %    Immature Grans % 0 0 - 2 %    Lymphocytes % 17 14 - 44 %    Monocytes % 6 4 - 12 %    Eosinophils Relative 1 0 - 6 %    Basophils Relative 0 0 - 1 %    Absolute Neutrophils 6.99 1.85 - 7.62 Thousands/µL    Absolute Immature Grans 0.03 0.00 - 0.20 Thousand/uL    Absolute Lymphocytes 1.57 0.60 - 4.47 Thousands/µL    Absolute Monocytes 0.52 0.17 - 1.22 Thousand/µL    Eosinophils Absolute 0.07 0.00 - 0.61 Thousand/µL    Basophils Absolute 0.03 0.00 - 0.10 Thousands/µL   Comprehensive metabolic panel   Result Value Ref Range    Sodium 144 135 - 147 mmol/L    Potassium 4.4 3.5 - 5.3 mmol/L    Chloride 104 96 - 108 mmol/L  "   CO2 30 21 - 32 mmol/L    ANION GAP 10 4 - 13 mmol/L    BUN 15 5 - 25 mg/dL    Creatinine 1.02 0.60 - 1.30 mg/dL    Glucose 117 65 - 140 mg/dL    Calcium 10.5 (H) 8.4 - 10.2 mg/dL    AST 21 13 - 39 U/L    ALT 23 7 - 52 U/L    Alkaline Phosphatase 58 34 - 104 U/L    Total Protein 8.5 (H) 6.4 - 8.4 g/dL    Albumin 5.1 (H) 3.5 - 5.0 g/dL    Total Bilirubin 0.66 0.20 - 1.00 mg/dL    eGFR 85 ml/min/1.73sq m   UA w Reflex to Microscopic w Reflex to Culture    Specimen: Urine, Clean Catch   Result Value Ref Range    Color, UA Light Yellow     Clarity, UA Clear     Specific Gravity, UA 1.032 (H) 1.003 - 1.030    pH, UA 5.5 4.5, 5.0, 5.5, 6.0, 6.5, 7.0, 7.5, 8.0    Leukocytes, UA (A) Negative     Elevated glucose may cause decreased leukocyte values. See urine microscopic for [facility] result    Nitrite, UA Negative Negative    Protein, UA Negative Negative mg/dl    Glucose, UA >=1000 (1%) (A) Negative mg/dl    Ketones, UA Negative Negative mg/dl    Urobilinogen, UA <2.0 <2.0 mg/dl mg/dl    Bilirubin, UA Negative Negative    Occult Blood, UA Negative Negative   HS Troponin 0hr (reflex protocol)   Result Value Ref Range    hs TnI 0hr <2 \"Refer to ACS Flowchart\"- see link ng/L   Result Value Ref Range    Lipase 36 11 - 82 u/L   Urine Microscopic   Result Value Ref Range    RBC, UA 1-2 None Seen, 1-2 /hpf    WBC, UA None Seen None Seen, 1-2 /hpf    Epithelial Cells None Seen None Seen, Occasional /hpf    Bacteria, UA None Seen None Seen, Occasional /hpf    MUCUS THREADS Occasional (A) None Seen   Lactic acid, plasma (w/reflex if result > 2.0)   Result Value Ref Range    LACTIC ACID 1.2 0.5 - 2.0 mmol/L   Basic metabolic panel   Result Value Ref Range    Sodium 146 135 - 147 mmol/L    Potassium 3.8 3.5 - 5.3 mmol/L    Chloride 108 96 - 108 mmol/L    CO2 27 21 - 32 mmol/L    ANION GAP 11 4 - 13 mmol/L    BUN 18 5 - 25 mg/dL    Creatinine 0.95 0.60 - 1.30 mg/dL    Glucose 132 65 - 140 mg/dL    Calcium 9.1 8.4 - 10.2 mg/dL    eGFR 93 " ml/min/1.73sq m   CBC and differential   Result Value Ref Range    WBC 10.57 (H) 4.31 - 10.16 Thousand/uL    RBC 5.82 (H) 3.88 - 5.62 Million/uL    Hemoglobin 16.5 12.0 - 17.0 g/dL    Hematocrit 50.2 (H) 36.5 - 49.3 %    MCV 86 82 - 98 fL    MCH 28.4 26.8 - 34.3 pg    MCHC 32.9 31.4 - 37.4 g/dL    RDW 12.7 11.6 - 15.1 %    MPV 9.9 8.9 - 12.7 fL    Platelets 211 149 - 390 Thousands/uL    nRBC 0 /100 WBCs    Segmented % 77 (H) 43 - 75 %    Immature Grans % 0 0 - 2 %    Lymphocytes % 14 14 - 44 %    Monocytes % 8 4 - 12 %    Eosinophils Relative 1 0 - 6 %    Basophils Relative 0 0 - 1 %    Absolute Neutrophils 8.11 (H) 1.85 - 7.62 Thousands/µL    Absolute Immature Grans 0.04 0.00 - 0.20 Thousand/uL    Absolute Lymphocytes 1.47 0.60 - 4.47 Thousands/µL    Absolute Monocytes 0.84 0.17 - 1.22 Thousand/µL    Eosinophils Absolute 0.09 0.00 - 0.61 Thousand/µL    Basophils Absolute 0.02 0.00 - 0.10 Thousands/µL   Result Value Ref Range    Magnesium 1.9 1.9 - 2.7 mg/dL   Result Value Ref Range    Phosphorus 4.2 2.7 - 4.5 mg/dL   Basic metabolic panel   Result Value Ref Range    Sodium 144 135 - 147 mmol/L    Potassium 3.7 3.5 - 5.3 mmol/L    Chloride 108 96 - 108 mmol/L    CO2 29 21 - 32 mmol/L    ANION GAP 7 4 - 13 mmol/L    BUN 15 5 - 25 mg/dL    Creatinine 0.96 0.60 - 1.30 mg/dL    Glucose 71 65 - 140 mg/dL    Calcium 8.1 (L) 8.4 - 10.2 mg/dL    eGFR 92 ml/min/1.73sq m   CBC   Result Value Ref Range    WBC 7.92 4.31 - 10.16 Thousand/uL    RBC 5.27 3.88 - 5.62 Million/uL    Hemoglobin 15.1 12.0 - 17.0 g/dL    Hematocrit 47.1 36.5 - 49.3 %    MCV 89 82 - 98 fL    MCH 28.7 26.8 - 34.3 pg    MCHC 32.1 31.4 - 37.4 g/dL    RDW 12.7 11.6 - 15.1 %    Platelets 169 149 - 390 Thousands/uL    MPV 10.7 8.9 - 12.7 fL   Result Value Ref Range    Phosphorus 2.4 (L) 2.7 - 4.5 mg/dL   Result Value Ref Range    Magnesium 2.3 1.9 - 2.7 mg/dL   Hepatic function panel   Result Value Ref Range    Total Bilirubin 0.80 0.20 - 1.00 mg/dL     Bilirubin, Direct 0.14 0.00 - 0.20 mg/dL    Alkaline Phosphatase 43 34 - 104 U/L    AST 11 (L) 13 - 39 U/L    ALT 16 7 - 52 U/L    Total Protein 6.0 (L) 6.4 - 8.4 g/dL    Albumin 3.7 3.5 - 5.0 g/dL   Cancer antigen 19-9   Result Value Ref Range    CA 19-9 7 0 - 35 U/mL   Basic metabolic panel   Result Value Ref Range    Sodium 140 135 - 147 mmol/L    Potassium 3.8 3.5 - 5.3 mmol/L    Chloride 106 96 - 108 mmol/L    CO2 27 21 - 32 mmol/L    ANION GAP 7 4 - 13 mmol/L    BUN 11 5 - 25 mg/dL    Creatinine 0.95 0.60 - 1.30 mg/dL    Glucose 86 65 - 140 mg/dL    Calcium 8.1 (L) 8.4 - 10.2 mg/dL    eGFR 93 ml/min/1.73sq m   CBC and differential   Result Value Ref Range    WBC 6.97 4.31 - 10.16 Thousand/uL    RBC 4.94 3.88 - 5.62 Million/uL    Hemoglobin 14.4 12.0 - 17.0 g/dL    Hematocrit 43.5 36.5 - 49.3 %    MCV 88 82 - 98 fL    MCH 29.1 26.8 - 34.3 pg    MCHC 33.1 31.4 - 37.4 g/dL    RDW 12.5 11.6 - 15.1 %    MPV 9.9 8.9 - 12.7 fL    Platelets 185 149 - 390 Thousands/uL    nRBC 0 /100 WBCs    Segmented % 67 43 - 75 %    Immature Grans % 0 0 - 2 %    Lymphocytes % 22 14 - 44 %    Monocytes % 7 4 - 12 %    Eosinophils Relative 4 0 - 6 %    Basophils Relative 0 0 - 1 %    Absolute Neutrophils 4.64 1.85 - 7.62 Thousands/µL    Absolute Immature Grans 0.03 0.00 - 0.20 Thousand/uL    Absolute Lymphocytes 1.55 0.60 - 4.47 Thousands/µL    Absolute Monocytes 0.47 0.17 - 1.22 Thousand/µL    Eosinophils Absolute 0.27 0.00 - 0.61 Thousand/µL    Basophils Absolute 0.01 0.00 - 0.10 Thousands/µL   Fingerstick Glucose (POCT)   Result Value Ref Range    POC Glucose 132 65 - 140 mg/dl   Fingerstick Glucose (POCT)   Result Value Ref Range    POC Glucose 126 65 - 140 mg/dl   Fingerstick Glucose (POCT)   Result Value Ref Range    POC Glucose 104 65 - 140 mg/dl   Fingerstick Glucose (POCT)   Result Value Ref Range    POC Glucose 76 65 - 140 mg/dl   Fingerstick Glucose (POCT)   Result Value Ref Range    POC Glucose 159 (H) 65 - 140 mg/dl    Fingerstick Glucose (POCT)   Result Value Ref Range    POC Glucose 79 65 - 140 mg/dl   Fingerstick Glucose (POCT)   Result Value Ref Range    POC Glucose 78 65 - 140 mg/dl   Fingerstick Glucose (POCT)   Result Value Ref Range    POC Glucose 82 65 - 140 mg/dl   Fingerstick Glucose (POCT)   Result Value Ref Range    POC Glucose 67 65 - 140 mg/dl   Fingerstick Glucose (POCT)   Result Value Ref Range    POC Glucose 59 (L) 65 - 140 mg/dl   Fingerstick Glucose (POCT)   Result Value Ref Range    POC Glucose 79 65 - 140 mg/dl   Fingerstick Glucose (POCT)   Result Value Ref Range    POC Glucose 117 65 - 140 mg/dl   Fingerstick Glucose (POCT)   Result Value Ref Range    POC Glucose 84 65 - 140 mg/dl   Fingerstick Glucose (POCT)   Result Value Ref Range    POC Glucose 73 65 - 140 mg/dl   Fingerstick Glucose (POCT)   Result Value Ref Range    POC Glucose 103 65 - 140 mg/dl   Fingerstick Glucose (POCT)   Result Value Ref Range    POC Glucose 102 65 - 140 mg/dl   Fingerstick Glucose (POCT)   Result Value Ref Range    POC Glucose 132 65 - 140 mg/dl

## 2025-04-14 NOTE — ASSESSMENT & PLAN NOTE
49-year-old male with uncontrolled diabetes mellitus, obesity, hyperlipidemia, presented with abdominal distention with nausea, vomiting in the setting of ileus, repeat CAT scan showed small amount of portal venous gas, multiple gas containing perigastric vein, with new thrombus in the left portal vein    Initiated on Lovenox and later on switched to Eliquis 5 mg twice a day tolerating this very well no evidence of bleeding    Negative family history of clotting disorder    I believe this is provoked by the ileus and the cough and microperforation with gas in the perigastric vein with subsequently new thrombus in the left portal vein    Treatment for at least 3-month with apixaban, will repeat CAT scan to assess the status and will proceed from there  Orders:    CT abdomen pelvis w contrast; Future    Comprehensive metabolic panel; Future    CBC and differential; Future     no

## 2025-05-12 DIAGNOSIS — I81 PORTAL VEIN THROMBOSIS: ICD-10-CM

## 2025-05-12 NOTE — TELEPHONE ENCOUNTER
Reason for call:   [x] Refill   [] Prior Auth  [] Other:     Office:   [] PCP/Provider -   [x] Specialty/Provider - Hem/Onc    Medication: apixaban (Eliquis) 5 mg     Dose/Frequency: 1 tablet (5 mg total) 2 (two) times a day     Quantity: 60    Pharmacy: [facility]/pharmacy #[id number] - [place] - [address]     Local Pharmacy   Does the patient have enough for 3 days?   [] Yes   [x] No - Send as HP to POD    Mail Away Pharmacy   Does the patient have enough for 10 days?   [] Yes   [] No - Send as HP to POD

## 2025-05-20 ENCOUNTER — OFFICE VISIT (OUTPATIENT)
Dept: FAMILY MEDICINE CLINIC | Facility: CLINIC | Age: 50
End: 2025-05-20
Payer: COMMERCIAL

## 2025-05-20 VITALS
SYSTOLIC BLOOD PRESSURE: 118 MMHG | HEART RATE: 77 BPM | TEMPERATURE: 97.3 F | WEIGHT: 214 LBS | BODY MASS INDEX: 33.59 KG/M2 | DIASTOLIC BLOOD PRESSURE: 78 MMHG | OXYGEN SATURATION: 98 % | HEIGHT: 67 IN

## 2025-05-20 DIAGNOSIS — E11.9 TYPE 2 DIABETES MELLITUS WITHOUT COMPLICATION, WITHOUT LONG-TERM CURRENT USE OF INSULIN (HCC): Primary | ICD-10-CM

## 2025-05-20 DIAGNOSIS — I81 PORTAL VEIN THROMBOSIS: ICD-10-CM

## 2025-05-20 DIAGNOSIS — E78.2 MIXED HYPERLIPIDEMIA: ICD-10-CM

## 2025-05-20 LAB — SL AMB POCT HEMOGLOBIN AIC: 7.6 (ref ?–6.5)

## 2025-05-20 PROCEDURE — 99214 OFFICE O/P EST MOD 30 MIN: CPT | Performed by: FAMILY MEDICINE

## 2025-05-20 PROCEDURE — 83036 HEMOGLOBIN GLYCOSYLATED A1C: CPT | Performed by: FAMILY MEDICINE

## 2025-05-20 RX ORDER — LANCETS 28 GAUGE
EACH MISCELLANEOUS DAILY
Qty: 100 EACH | Refills: 3 | Status: SHIPPED | OUTPATIENT
Start: 2025-05-20

## 2025-05-20 RX ORDER — BLOOD SUGAR DIAGNOSTIC
STRIP MISCELLANEOUS
Qty: 100 STRIP | Refills: 3 | Status: SHIPPED | OUTPATIENT
Start: 2025-05-20 | End: 2025-05-21 | Stop reason: SDUPTHER

## 2025-05-20 NOTE — PROGRESS NOTES
Name: Iam Delgado      : 1975      MRN: [id number]  Encounter Provider: Alvin Sumner MD  Encounter Date: 2025   Encounter department: [facility]    Assessment & Plan  Type 2 diabetes mellitus without complication, without long-term current use of insulin (HCC)  Improved off of Mounjaro. Pt is doing better with diet.  Encouraged exercise and weight loss.  Monitor home BG  Recheck 6m  Lab Results   Component Value Date    HGBA1C 7.6 (A) 2025   Orders:  •  POCT hemoglobin A1c  •  Albumin / creatinine urine ratio; Future  •  Lancets (freestyle) lancets; Use daily Use as instructed  •  glucose blood (FREESTYLE TEST STRIPS) test strip; TEST BLOOD SUGARS ONCE DAILY    Portal vein thrombosis  As consequence of recent illeus/dehyrdatio/vomiting.  Pt initially had gas in the portal system, but a small clot then developed.  Doing better since starting Eliquis.  Exam unremarkable and all symptoms have resolved since stopping Mounjaro.  F/u with Hematology. Continue to monitor    Mixed hyperlipidemia   Due for repeat labs. Continue atorvastatin.  Recheck 6m            History of Present Illness     f/u multiple med issues  - admitted 3/8 for abd pain.  Patient had developed significant abdominal discomfort with distention.  In the emergency room, CT showed wall amount of portal venous gas as well as gas containing gastric which surrounded a distended stomach.  Also multiple loops of mildly dilated fluid-filled small bowel.  Liquid stool was noted in the colon.  While in the hospital, patient was started on NG tube decompression.  Patient subsequently developed a left portal vein thrombosis.  Hematology was consulted and patient was started on Eliquis.  Unclear cause for all symptoms.  Possible side effect of Mounjaro was considered however it was decided that patient could continue medication.  He was discharged on 3/11.  Several weeks later, patient started having  similar symptoms.  At that point he decided to stop the Mounjaro and symptoms abated.  He has not been on medication since then.    - pt states that he is doing better with his diet since then.  A1C = 7.6 today in the office.  - hoping to start exercising in the summer.  Pt denies CP, palpitations, lightheadedness or other CV symptoms with or without exertion  - no other concerns        Review of Systems   Constitutional: Negative.    HENT: Negative.     Eyes: Negative.    Respiratory: Negative.     Cardiovascular: Negative.    Gastrointestinal:  Negative for abdominal distention, abdominal pain (resolved), constipation, diarrhea, nausea and vomiting.   Genitourinary: Negative.    Musculoskeletal: Negative.    Skin: Negative.    Neurological: Negative.      Past Medical History:   Diagnosis Date   • Bell's palsy    • Diabetes mellitus (HCC)     elevated bloodsugars    • GERD (gastroesophageal reflux disease)    • Hyperlipidemia      Past Surgical History:   Procedure Laterality Date   • CHEST WALL BIOPSY N/A 09/15/2016    Procedure: CHEST LESION EXCISION ;  Surgeon: Alejandro Mejia MD;  [facility];  Service:    • LYMPH NODE BIOPSY     • ID BX/EXC LYMPH NODE OPEN SUPERFICIAL Right 09/15/2016    Procedure: AXILLARY LYMPH NODE BIOPSY WITH FLOW CYTOMETRY;  Surgeon: Alejandro Mejia MD;  Location[facility];  Service: General   • TRUNK SKIN LESION EXCISIONAL BIOPSY      Benign 2.1-3 cm   • UPPER ENDOSCOPY W/ ESOPHAGEAL MANOMETRY     • UPPER GASTROINTESTINAL ENDOSCOPY       Family History   Problem Relation Name Age of Onset   • Hypertension Mother Sujey Delgado    • Diabetes Father Iam Rodriguezmckay    • Prostate cancer Father Iam Rodriguezmckay      Social History     Tobacco Use   • Smoking status: Never     Passive exposure: Never   • Smokeless tobacco: Never   Vaping Use   • Vaping status: Never Used   Substance and Sexual Activity   • Alcohol use: Not Currently     Comment: socially per AS   • Drug use: No   •  "Sexual activity: Yes     Partners: Female     Medications[1]  Allergies   Allergen Reactions   • Mounjaro [Tirzepatide] Abdominal Pain     Immunization History   Administered Date(s) Administered   • COVID-19 MODERNA VACC 0.5 ML IM 01/15/2021, 02/12/2021   • INFLUENZA 12/10/2020   • Influenza, injectable, quadrivalent, preservative free 0.5 mL 12/10/2020   • Influenza, recombinant, quadrivalent,injectable, preservative free 11/18/2019   • Varicella 04/02/2024, 05/02/2024     Objective   /78   Pulse 77   Temp (!) 97.3 °F (36.3 °C)   Ht 5' 7\" (1.702 m)   Wt 97.1 kg (214 lb)   SpO2 98%   BMI 33.52 kg/m²     Physical Exam  Vitals reviewed.   Constitutional:       Appearance: Normal appearance.   HENT:      Mouth/Throat:      Mouth: Mucous membranes are moist.     Eyes:      General: No scleral icterus.     Extraocular Movements: Extraocular movements intact.      Conjunctiva/sclera: Conjunctivae normal.      Pupils: Pupils are equal, round, and reactive to light.       Cardiovascular:      Rate and Rhythm: Normal rate and regular rhythm.      Pulses: Normal pulses.   Pulmonary:      Effort: Pulmonary effort is normal.      Breath sounds: Normal breath sounds. No wheezing or rales.   Abdominal:      General: There is no distension.      Palpations: There is no mass.      Tenderness: There is no abdominal tenderness.     Musculoskeletal:         General: No swelling or tenderness.      Cervical back: Normal range of motion. No tenderness.      Right lower leg: No edema.      Left lower leg: No edema.   Lymphadenopathy:      Cervical: No cervical adenopathy.     Skin:     General: Skin is warm.      Capillary Refill: Capillary refill takes less than 2 seconds.     Neurological:      General: No focal deficit present.      Mental Status: He is alert and oriented to person, place, and time.                [1]  Current Outpatient Medications on File Prior to Visit   Medication Sig   • apixaban (Eliquis) 5 mg Take " 1 tablet (5 mg total) by mouth 2 (two) times a day for 23 days   • atorvastatin (LIPITOR) 10 mg tablet TAKE 1 TABLET DAILY   • Empagliflozin (Jardiance) 25 MG TABS TAKE 1 TABLET EVERY MORNING   • glipiZIDE (GLUCOTROL XL) 5 mg 24 hr tablet Take 1 tablet (5 mg total) by mouth daily   • Multiple Vitamin (MULTIVITAMIN PO) Take by mouth in the morning   • omeprazole (PriLOSEC) 20 mg delayed release capsule Take 1 capsule (20 mg total) by mouth daily before breakfast   • sildenafil (VIAGRA) 50 MG tablet Take 1 tablet (50 mg total) by mouth as needed for erectile dysfunction (WILL BE USING [facility]RX.)

## 2025-05-20 NOTE — ASSESSMENT & PLAN NOTE
Improved off of Mounjaro. Pt is doing better with diet.  Encouraged exercise and weight loss.  Monitor home BG  Recheck 6m  Lab Results   Component Value Date    HGBA1C 7.6 (A) 05/20/2025   Orders:  •  POCT hemoglobin A1c  •  Albumin / creatinine urine ratio; Future  •  Lancets (freestyle) lancets; Use daily Use as instructed  •  glucose blood (FREESTYLE TEST STRIPS) test strip; TEST BLOOD SUGARS ONCE DAILY

## 2025-05-21 DIAGNOSIS — E11.9 TYPE 2 DIABETES MELLITUS WITHOUT COMPLICATION, WITHOUT LONG-TERM CURRENT USE OF INSULIN (HCC): ICD-10-CM

## 2025-05-22 DIAGNOSIS — E11.9 TYPE 2 DIABETES MELLITUS WITHOUT COMPLICATION, WITHOUT LONG-TERM CURRENT USE OF INSULIN (HCC): Primary | ICD-10-CM

## 2025-05-22 PROBLEM — K56.7 ILEUS (HCC): Status: RESOLVED | Noted: 2025-03-08 | Resolved: 2025-05-22

## 2025-05-22 RX ORDER — BLOOD SUGAR DIAGNOSTIC
STRIP MISCELLANEOUS
Qty: 100 STRIP | Refills: 0 | Status: SHIPPED | OUTPATIENT
Start: 2025-05-22 | End: 2025-05-22 | Stop reason: DRUGHIGH

## 2025-05-22 RX ORDER — BLOOD-GLUCOSE METER
KIT MISCELLANEOUS
Qty: 100 STRIP | Refills: 1 | Status: SHIPPED | OUTPATIENT
Start: 2025-05-22

## 2025-05-22 NOTE — ASSESSMENT & PLAN NOTE
As consequence of recent illeus/dehyrdatio/vomiting.  Pt initially had gas in the portal system, but a small clot then developed.  Doing better since starting Eliquis.  Exam unremarkable and all symptoms have resolved since stopping Mounjaro.  F/u with Hematology. Continue to monitor

## 2025-05-24 ENCOUNTER — HOSPITAL ENCOUNTER (EMERGENCY)
Facility: HOSPITAL | Age: 50
Discharge: HOME | End: 2025-05-24
Attending: EMERGENCY MEDICINE | Admitting: EMERGENCY MEDICINE
Payer: COMMERCIAL

## 2025-05-24 ENCOUNTER — APPOINTMENT (EMERGENCY)
Dept: VASCULAR ULTRASOUND | Facility: HOSPITAL | Age: 50
End: 2025-05-24
Payer: COMMERCIAL

## 2025-05-24 VITALS
RESPIRATION RATE: 19 BRPM | SYSTOLIC BLOOD PRESSURE: 134 MMHG | HEART RATE: 87 BPM | TEMPERATURE: 97.7 F | OXYGEN SATURATION: 96 % | DIASTOLIC BLOOD PRESSURE: 87 MMHG

## 2025-05-24 DIAGNOSIS — M79.89 FOOT SWELLING: Primary | ICD-10-CM

## 2025-05-24 PROCEDURE — 93971 EXTREMITY STUDY: CPT

## 2025-05-24 PROCEDURE — 99284 EMERGENCY DEPT VISIT MOD MDM: CPT

## 2025-05-24 PROCEDURE — 99283 EMERGENCY DEPT VISIT LOW MDM: CPT

## 2025-05-24 PROCEDURE — 93971 EXTREMITY STUDY: CPT | Performed by: SURGERY

## 2025-05-24 RX ORDER — ACETAMINOPHEN 325 MG/1
650 TABLET ORAL ONCE
Status: COMPLETED | OUTPATIENT
Start: 2025-05-24 | End: 2025-05-24

## 2025-05-24 RX ADMIN — ACETAMINOPHEN 650 MG: 325 TABLET ORAL at 10:42

## 2025-05-24 NOTE — ED PROVIDER NOTES
Time reflects when diagnosis was documented in both MDM as applicable and the Disposition within this note       Time User Action Codes Description Comment    5/24/2025 11:33 AM Iam Jackson [M79.89] Foot swelling           ED Disposition       ED Disposition   Discharge    Condition   Stable    Date/Time   Sat May 24, 2025 11:33 AM    Comment   Iam Delgado discharge to home/self care.                   Assessment & Plan       Medical Decision Making  Patient is a 50-year-old male coming in for evaluation of right foot swelling.  Does not extend up into the ankle or higher.  Neurovascularly intact at this time.  Duplex does not show any sign of DVT.  No indication for x-ray at this time, as no trauma history.  No concern for cellulitis at this time.  Has unilateral, and otherwise does not appear to be volume overloaded, no concern for heart failure currently.  Patient has no other systemic symptoms, denies chest pain or shortness of breath.  Gave patient Ace wrap, recommend follow-up with PCP    Amount and/or Complexity of Data Reviewed  Radiology:  Decision-making details documented in ED Course.    Risk  OTC drugs.        ED Course as of 05/24/25 1820   Sat May 24, 2025   1129 VAS lower limb venous duplex study, unilateral/limited  Per tech, duplex is negative for DVT.       Medications   acetaminophen (TYLENOL) tablet 650 mg (650 mg Oral Given 5/24/25 1042)       ED Risk Strat Scores                    No data recorded                            History of Present Illness       Chief Complaint   Patient presents with    Foot Swelling     Patient to ED with c/o foot swelling since Wednesday. Patient reported pain on ball of right foot and then started noticing some swelling.        Past Medical History[1]   Past Surgical History[2]   Family History[3]   Social History[4]   E-Cigarette/Vaping    E-Cigarette Use Never User       E-Cigarette/Vaping Substances    Nicotine No     THC No     CBD No      Flavoring No     Other No     Unknown No       I have reviewed and agree with the history as documented.     Patient is a 50-year-old male coming in for right foot swelling that started yesterday.  Denies any sensation changes, denies any trauma.  Reports he works as a , spends significant mount of time sitting.  Patient does have a history of blood clots in his liver, unsure of exactly why it was happened, however they believe it be secondary to SGLP1.  He is currently still taking his Eliquis, but did not take it this morning.          Review of Systems   Constitutional:  Negative for chills, fatigue and fever.   Respiratory:  Negative for chest tightness and shortness of breath.    Cardiovascular:  Negative for chest pain.   Gastrointestinal:  Negative for abdominal pain, nausea and vomiting.   Musculoskeletal:  Positive for joint swelling.   Neurological:  Negative for facial asymmetry, light-headedness, numbness and headaches.           Objective       ED Triage Vitals [05/24/25 1002]   Temperature Pulse Blood Pressure Respirations SpO2 Patient Position - Orthostatic VS   97.7 °F (36.5 °C) 87 134/87 19 96 % --      Temp Source Heart Rate Source BP Location FiO2 (%) Pain Score    Oral Monitor Right arm -- 9      Vitals      Date and Time Temp Pulse SpO2 Resp BP Pain Score FACES Pain Rating User   05/24/25 1042 -- -- -- -- -- 8 -- SM   05/24/25 1002 97.7 °F (36.5 °C) 87 96 % 19 134/87 9 --             Physical Exam  Vitals reviewed.   Constitutional:       Appearance: Normal appearance. He is normal weight.   HENT:      Head: Normocephalic and atraumatic.      Right Ear: External ear normal.      Left Ear: External ear normal.      Nose: Nose normal.     Eyes:      Conjunctiva/sclera: Conjunctivae normal.       Cardiovascular:      Rate and Rhythm: Normal rate.   Pulmonary:      Effort: Pulmonary effort is normal.     Musculoskeletal:         General: Swelling present. Normal range of motion.       Cervical back: Normal range of motion.      Right lower leg: No edema.      Left lower le+ Edema present.      Comments: Swelling in the right foot, 2+ dorsal pedal pulse.  Normal sensation.  No erythema, warmth.  No pitting edema     Skin:     General: Skin is warm and dry.     Neurological:      Mental Status: He is alert.         Results Reviewed       None            VAS lower limb venous duplex study, unilateral/limited   Final Interpretation by Iam Srinivasan MD ( 414)          Procedures    ED Medication and Procedure Management   Prior to Admission Medications   Prescriptions Last Dose Informant Patient Reported? Taking?   Empagliflozin (Jardiance) 25 MG TABS  Self No No   Sig: TAKE 1 TABLET EVERY MORNING   Lancets (freestyle) lancets   No No   Sig: Use daily Use as instructed   Multiple Vitamin (MULTIVITAMIN PO)  Self Yes No   Sig: Take by mouth in the morning   apixaban (Eliquis) 5 mg   No No   Sig: Take 1 tablet (5 mg total) by mouth 2 (two) times a day for 23 days   atorvastatin (LIPITOR) 10 mg tablet  Self No No   Sig: TAKE 1 TABLET DAILY   glipiZIDE (GLUCOTROL XL) 5 mg 24 hr tablet  Self No No   Sig: Take 1 tablet (5 mg total) by mouth daily   glucose blood (FREESTYLE LITE) test strip   No No   Sig: Use as instructed   omeprazole (PriLOSEC) 20 mg delayed release capsule  Self No No   Sig: Take 1 capsule (20 mg total) by mouth daily before breakfast   sildenafil (VIAGRA) 50 MG tablet  Self No No   Sig: Take 1 tablet (50 mg total) by mouth as needed for erectile dysfunction (WILL BE USING [facility]RX.)      Facility-Administered Medications: None     Discharge Medication List as of 2025 11:33 AM        CONTINUE these medications which have NOT CHANGED    Details   glucose blood (FREESTYLE LITE) test strip Use as instructed, Normal      apixaban (Eliquis) 5 mg Take 1 tablet (5 mg total) by mouth 2 (two) times a day for 23 days, Starting 2025, Until 2025, Normal       atorvastatin (LIPITOR) 10 mg tablet TAKE 1 TABLET DAILY, Starting Thu 3/27/2025, Normal      Empagliflozin (Jardiance) 25 MG TABS TAKE 1 TABLET EVERY MORNING, Starting Thu 12/12/2024, Normal      glipiZIDE (GLUCOTROL XL) 5 mg 24 hr tablet Take 1 tablet (5 mg total) by mouth daily, Starting Wed 4/9/2025, Normal      Lancets (freestyle) lancets Use daily Use as instructed, Starting Tue 5/20/2025, Normal      Multiple Vitamin (MULTIVITAMIN PO) Take by mouth in the morning, Historical Med      omeprazole (PriLOSEC) 20 mg delayed release capsule Take 1 capsule (20 mg total) by mouth daily before breakfast, Starting Tue 11/5/2024, Normal      sildenafil (VIAGRA) 50 MG tablet Take 1 tablet (50 mg total) by mouth as needed for erectile dysfunction (WILL BE USING [facility]RX.), Starting Fri 3/14/2025, Normal           No discharge procedures on file.  ED SEPSIS DOCUMENTATION   Time reflects when diagnosis was documented in both MDM as applicable and the Disposition within this note       Time User Action Codes Description Comment    5/24/2025 11:33 AM Iam Jackson [M79.89] Foot swelling                      [1]   Past Medical History:  Diagnosis Date    Bell's palsy     Diabetes mellitus (HCC)     elevated bloodsugars     GERD (gastroesophageal reflux disease)     Hyperlipidemia    [2]   Past Surgical History:  Procedure Laterality Date    CHEST WALL BIOPSY N/A 09/15/2016    Procedure: CHEST LESION EXCISION ;  Surgeon: Alejandro Mejia MD;  [facility];  Service:     LYMPH NODE BIOPSY      OK BX/EXC LYMPH NODE OPEN SUPERFICIAL Right 09/15/2016    Procedure: AXILLARY LYMPH NODE BIOPSY WITH FLOW CYTOMETRY;  Surgeon: Alejandro Mejia MD;  [facility];  Service: General    TRUNK SKIN LESION EXCISIONAL BIOPSY      Benign 2.1-3 cm    UPPER ENDOSCOPY W/ ESOPHAGEAL MANOMETRY      UPPER GASTROINTESTINAL ENDOSCOPY     [3]   Family History  Problem Relation Name Age of Onset    Hypertension Mother Sujey Delgado      Diabetes Father Iam Delgado     Prostate cancer Father Iam Delgado    [4]   Social History  Tobacco Use    Smoking status: Never     Passive exposure: Never    Smokeless tobacco: Never   Vaping Use    Vaping status: Never Used   Substance Use Topics    Alcohol use: Not Currently     Comment: socially per AS    Drug use: No        Iam Jackson PA-C  05/24/25 5977

## 2025-05-24 NOTE — Clinical Note
Iam Danny was seen and treated in our emergency department on 5/24/2025.    No restrictions            Diagnosis:     Iam  .    He may return on this date: 05/26/2025         If you have any questions or concerns, please don't hesitate to call.      Iam Jackson PA-C    ______________________________           _______________          _______________  Hospital Representative                              Date                                Time

## 2025-06-16 DIAGNOSIS — E11.9 TYPE 2 DIABETES MELLITUS WITHOUT COMPLICATION, WITHOUT LONG-TERM CURRENT USE OF INSULIN (HCC): ICD-10-CM

## 2025-06-16 RX ORDER — EMPAGLIFLOZIN 25 MG/1
25 TABLET, FILM COATED ORAL EVERY MORNING
Qty: 90 TABLET | Refills: 3 | Status: SHIPPED | OUTPATIENT
Start: 2025-06-16

## 2025-07-01 ENCOUNTER — APPOINTMENT (OUTPATIENT)
Dept: LAB | Facility: CLINIC | Age: 50
End: 2025-07-01
Payer: COMMERCIAL

## 2025-07-01 DIAGNOSIS — I81 PORTAL VEIN THROMBOSIS: ICD-10-CM

## 2025-07-01 LAB
ALBUMIN SERPL BCG-MCNC: 4.6 G/DL (ref 3.5–5)
ALP SERPL-CCNC: 65 U/L (ref 34–104)
ALT SERPL W P-5'-P-CCNC: 36 U/L (ref 7–52)
ANION GAP SERPL CALCULATED.3IONS-SCNC: 10 MMOL/L (ref 4–13)
AST SERPL W P-5'-P-CCNC: 29 U/L (ref 13–39)
BASOPHILS # BLD AUTO: 0.04 THOUSANDS/ÂΜL (ref 0–0.1)
BASOPHILS NFR BLD AUTO: 1 % (ref 0–1)
BILIRUB SERPL-MCNC: 0.64 MG/DL (ref 0.2–1)
BUN SERPL-MCNC: 21 MG/DL (ref 5–25)
CALCIUM SERPL-MCNC: 9.9 MG/DL (ref 8.4–10.2)
CHLORIDE SERPL-SCNC: 101 MMOL/L (ref 96–108)
CO2 SERPL-SCNC: 28 MMOL/L (ref 21–32)
CREAT SERPL-MCNC: 0.92 MG/DL (ref 0.6–1.3)
EOSINOPHIL # BLD AUTO: 0.23 THOUSAND/ÂΜL (ref 0–0.61)
EOSINOPHIL NFR BLD AUTO: 4 % (ref 0–6)
ERYTHROCYTE [DISTWIDTH] IN BLOOD BY AUTOMATED COUNT: 12.7 % (ref 11.6–15.1)
GFR SERPL CREATININE-BSD FRML MDRD: 96 ML/MIN/1.73SQ M
GLUCOSE P FAST SERPL-MCNC: 186 MG/DL (ref 65–99)
HCT VFR BLD AUTO: 49.9 % (ref 36.5–49.3)
HGB BLD-MCNC: 16.6 G/DL (ref 12–17)
IMM GRANULOCYTES # BLD AUTO: 0.02 THOUSAND/UL (ref 0–0.2)
IMM GRANULOCYTES NFR BLD AUTO: 0 % (ref 0–2)
LYMPHOCYTES # BLD AUTO: 2.15 THOUSANDS/ÂΜL (ref 0.6–4.47)
LYMPHOCYTES NFR BLD AUTO: 37 % (ref 14–44)
MCH RBC QN AUTO: 28.8 PG (ref 26.8–34.3)
MCHC RBC AUTO-ENTMCNC: 33.3 G/DL (ref 31.4–37.4)
MCV RBC AUTO: 87 FL (ref 82–98)
MONOCYTES # BLD AUTO: 0.37 THOUSAND/ÂΜL (ref 0.17–1.22)
MONOCYTES NFR BLD AUTO: 6 % (ref 4–12)
NEUTROPHILS # BLD AUTO: 2.95 THOUSANDS/ÂΜL (ref 1.85–7.62)
NEUTS SEG NFR BLD AUTO: 52 % (ref 43–75)
NRBC BLD AUTO-RTO: 0 /100 WBCS
PLATELET # BLD AUTO: 191 THOUSANDS/UL (ref 149–390)
PMV BLD AUTO: 11.3 FL (ref 8.9–12.7)
POTASSIUM SERPL-SCNC: 4.5 MMOL/L (ref 3.5–5.3)
PROT SERPL-MCNC: 7.4 G/DL (ref 6.4–8.4)
RBC # BLD AUTO: 5.76 MILLION/UL (ref 3.88–5.62)
SODIUM SERPL-SCNC: 139 MMOL/L (ref 135–147)
WBC # BLD AUTO: 5.76 THOUSAND/UL (ref 4.31–10.16)

## 2025-07-01 PROCEDURE — 80053 COMPREHEN METABOLIC PANEL: CPT

## 2025-07-01 PROCEDURE — 85025 COMPLETE CBC W/AUTO DIFF WBC: CPT

## 2025-07-01 PROCEDURE — 36415 COLL VENOUS BLD VENIPUNCTURE: CPT

## 2025-07-07 ENCOUNTER — HOSPITAL ENCOUNTER (OUTPATIENT)
Dept: CT IMAGING | Facility: HOSPITAL | Age: 50
Discharge: HOME | End: 2025-07-07
Attending: INTERNAL MEDICINE
Payer: COMMERCIAL

## 2025-07-07 DIAGNOSIS — I81 PORTAL VEIN THROMBOSIS: ICD-10-CM

## 2025-07-07 PROCEDURE — 74177 CT ABD & PELVIS W/CONTRAST: CPT

## 2025-07-07 RX ADMIN — IOHEXOL 100 ML: 350 INJECTION, SOLUTION INTRAVENOUS at 09:49

## 2025-07-16 ENCOUNTER — OFFICE VISIT (OUTPATIENT)
Dept: HEMATOLOGY ONCOLOGY | Facility: CLINIC | Age: 50
End: 2025-07-16
Payer: COMMERCIAL

## 2025-07-16 VITALS
WEIGHT: 217.5 LBS | SYSTOLIC BLOOD PRESSURE: 122 MMHG | HEIGHT: 67 IN | RESPIRATION RATE: 18 BRPM | DIASTOLIC BLOOD PRESSURE: 88 MMHG | OXYGEN SATURATION: 97 % | TEMPERATURE: 98.1 F | BODY MASS INDEX: 34.14 KG/M2 | HEART RATE: 84 BPM

## 2025-07-16 DIAGNOSIS — I81 PORTAL VEIN THROMBOSIS: Primary | ICD-10-CM

## 2025-07-16 PROBLEM — K83.8 PNEUMOBILIA: Status: RESOLVED | Noted: 2025-03-08 | Resolved: 2025-07-16

## 2025-07-16 PROCEDURE — 99213 OFFICE O/P EST LOW 20 MIN: CPT | Performed by: INTERNAL MEDICINE

## 2025-07-16 NOTE — PROGRESS NOTES
Name: Iam Delgado      : 1975      MRN: [id number]  Encounter Provider: Aspen Perla MD  Encounter Date: 2025   Encounter department: [facility]  :  Assessment & Plan  Portal vein thrombosis  50-year-old male with uncontrolled diabetes mellitus, obesity, hyperlipidemia, presented with abdominal distention with nausea, vomiting in the setting of ileus, repeat CAT scan showed small amount of portal venous gas, multiple gas containing perigastric vein, with new thrombus in the left portal vein     Initiated on Lovenox and later on switched to Eliquis 5 mg twice a day tolerating this very well no evidence of bleeding     Negative family history of clotting disorder     I believe this is provoked by the ileus  and microperforation with gas in the perigastric vein with subsequently new thrombus in the left portal vein     Treatment for at least 3-month with apixaban, CAT scan on 2025 showed resolution of the left portal vein thrombus however diminished in size compared to prior appearance secondary to scar formation and new moderate atrophy of the left hepatic lobe with compensatory hypertrophy of the right lobe    At this time patient to start aspirin 81 mg p.o. every other day    From hematology point of view no need for follow-up           Assessment & Plan        No follow-ups on file.    History of Present Illness   Chief Complaint   Patient presents with    Follow-up     History of Present Illness  50-year-old male with past medical history significant for type 2 diabetes, A1c 10.1 from 2024, hyperlipidemia, history of Bell's palsy right sided who presented to the ED on 3/8/2025 with chief complaint of nausea/vomiting/abdominal distention with CT  abdomen and pelvis showing small amount of portal venous gas as well as multiple gas containing perigastric vein surrounding a mildly distended stomach also multiple loops of mildly dilated  "fluid-filled small bowel with general surgery consultation with concerns for ileus managed conservatively however persistent high output from NG tube requiring repeat CT scan performed 3/10/2025 showing persistent portal vein gas with now new thrombus in left portal vein and resolution of venous gas in gastroepiploic vein.     Surgical consultation for evaluation for hypercoagulability workup and or possible antithrombolytic therapy.     Patient seen at bedside, denying any previous clot history or family history of clotting history       Review of Systems   Constitutional:  Negative for chills and fever.   HENT:  Negative for ear pain and sore throat.    Eyes:  Negative for pain and visual disturbance.   Respiratory:  Negative for cough and shortness of breath.    Cardiovascular:  Negative for chest pain and palpitations.   Gastrointestinal:  Negative for abdominal pain and vomiting.   Genitourinary:  Negative for dysuria and hematuria.   Musculoskeletal:  Negative for arthralgias and back pain.   Skin:  Negative for color change and rash.   Neurological:  Negative for seizures and syncope.   All other systems reviewed and are negative.          Objective   /88 (BP Location: Left arm, Patient Position: Sitting, Cuff Size: Large)   Pulse 84   Temp 98.1 °F (36.7 °C) (Temporal)   Resp 18   Ht 5' 7\" (1.702 m)   Wt 98.7 kg (217 lb 8 oz)   SpO2 97%   BMI 34.07 kg/m²     Physical Exam  Vitals reviewed.   Constitutional:       General: He is not in acute distress.     Appearance: He is well-developed. He is not diaphoretic.   HENT:      Head: Normocephalic and atraumatic.     Eyes:      Conjunctiva/sclera: Conjunctivae normal.     Neck:      Trachea: No tracheal deviation.     Cardiovascular:      Rate and Rhythm: Normal rate and regular rhythm.      Heart sounds: No murmur heard.     No friction rub. No gallop.   Pulmonary:      Effort: Pulmonary effort is normal. No respiratory distress.      Breath sounds: " No decreased air movement. No decreased breath sounds, wheezing or rales.   Chest:      Chest wall: No tenderness.   Abdominal:      General: There is no distension.      Palpations: Abdomen is soft. There is no hepatomegaly or splenomegaly.      Tenderness: There is no abdominal tenderness.     Musculoskeletal:      Cervical back: Normal range of motion and neck supple.      Right lower leg: No edema.      Left lower leg: No edema.   Lymphadenopathy:      Head:      Right side of head: No submental or submandibular adenopathy.      Left side of head: No submental or submandibular adenopathy.      Cervical: No cervical adenopathy.      Upper Body:      Right upper body: No supraclavicular or axillary adenopathy.      Left upper body: No supraclavicular or axillary adenopathy.      Lower Body: No right inguinal adenopathy. No left inguinal adenopathy.     Skin:     General: Skin is warm and dry.      Coloration: Skin is not pale.      Findings: No erythema or rash.     Neurological:      General: No focal deficit present.      Mental Status: He is alert and oriented to person, place, and time.     Psychiatric:         Behavior: Behavior normal.         Thought Content: Thought content normal.         Judgment: Judgment normal.       Physical Exam      Results    Labs: I have reviewed the following labs:  Results for orders placed or performed in visit on 07/01/25   Comprehensive metabolic panel   Result Value Ref Range    Sodium 139 135 - 147 mmol/L    Potassium 4.5 3.5 - 5.3 mmol/L    Chloride 101 96 - 108 mmol/L    CO2 28 21 - 32 mmol/L    ANION GAP 10 4 - 13 mmol/L    BUN 21 5 - 25 mg/dL    Creatinine 0.92 0.60 - 1.30 mg/dL    Glucose, Fasting 186 (H) 65 - 99 mg/dL    Calcium 9.9 8.4 - 10.2 mg/dL    AST 29 13 - 39 U/L    ALT 36 7 - 52 U/L    Alkaline Phosphatase 65 34 - 104 U/L    Total Protein 7.4 6.4 - 8.4 g/dL    Albumin 4.6 3.5 - 5.0 g/dL    Total Bilirubin 0.64 0.20 - 1.00 mg/dL    eGFR 96 ml/min/1.73sq m    CBC and differential   Result Value Ref Range    WBC 5.76 4.31 - 10.16 Thousand/uL    RBC 5.76 (H) 3.88 - 5.62 Million/uL    Hemoglobin 16.6 12.0 - 17.0 g/dL    Hematocrit 49.9 (H) 36.5 - 49.3 %    MCV 87 82 - 98 fL    MCH 28.8 26.8 - 34.3 pg    MCHC 33.3 31.4 - 37.4 g/dL    RDW 12.7 11.6 - 15.1 %    MPV 11.3 8.9 - 12.7 fL    Platelets 191 149 - 390 Thousands/uL    nRBC 0 /100 WBCs    Segmented % 52 43 - 75 %    Immature Grans % 0 0 - 2 %    Lymphocytes % 37 14 - 44 %    Monocytes % 6 4 - 12 %    Eosinophils Relative 4 0 - 6 %    Basophils Relative 1 0 - 1 %    Absolute Neutrophils 2.95 1.85 - 7.62 Thousands/µL    Absolute Immature Grans 0.02 0.00 - 0.20 Thousand/uL    Absolute Lymphocytes 2.15 0.60 - 4.47 Thousands/µL    Absolute Monocytes 0.37 0.17 - 1.22 Thousand/µL    Eosinophils Absolute 0.23 0.00 - 0.61 Thousand/µL    Basophils Absolute 0.04 0.00 - 0.10 Thousands/µL

## 2025-07-16 NOTE — ASSESSMENT & PLAN NOTE
50-year-old male with uncontrolled diabetes mellitus, obesity, hyperlipidemia, presented with abdominal distention with nausea, vomiting in the setting of ileus, repeat CAT scan showed small amount of portal venous gas, multiple gas containing perigastric vein, with new thrombus in the left portal vein     Initiated on Lovenox and later on switched to Eliquis 5 mg twice a day tolerating this very well no evidence of bleeding     Negative family history of clotting disorder     I believe this is provoked by the ileus  and microperforation with gas in the perigastric vein with subsequently new thrombus in the left portal vein     Treatment for at least 3-month with apixaban, CAT scan on 7/7/2025 showed resolution of the left portal vein thrombus however diminished in size compared to prior appearance secondary to scar formation and new moderate atrophy of the left hepatic lobe with compensatory hypertrophy of the right lobe    At this time patient to start aspirin 81 mg p.o. every other day    From hematology point of view no need for follow-up

## 2025-07-23 DIAGNOSIS — K21.9 GERD WITHOUT ESOPHAGITIS: ICD-10-CM

## 2025-07-25 RX ORDER — OMEPRAZOLE 20 MG/1
20 CAPSULE, DELAYED RELEASE ORAL
Qty: 90 CAPSULE | Refills: 1 | Status: SHIPPED | OUTPATIENT
Start: 2025-07-25